# Patient Record
Sex: MALE | Race: BLACK OR AFRICAN AMERICAN | NOT HISPANIC OR LATINO
[De-identification: names, ages, dates, MRNs, and addresses within clinical notes are randomized per-mention and may not be internally consistent; named-entity substitution may affect disease eponyms.]

---

## 2021-10-16 ENCOUNTER — TRANSCRIPTION ENCOUNTER (OUTPATIENT)
Age: 36
End: 2021-10-16

## 2022-08-31 ENCOUNTER — APPOINTMENT (OUTPATIENT)
Dept: GASTROENTEROLOGY | Facility: CLINIC | Age: 37
End: 2022-08-31

## 2022-08-31 VITALS
WEIGHT: 315 LBS | SYSTOLIC BLOOD PRESSURE: 171 MMHG | HEIGHT: 68 IN | DIASTOLIC BLOOD PRESSURE: 84 MMHG | OXYGEN SATURATION: 99 % | BODY MASS INDEX: 47.74 KG/M2 | HEART RATE: 110 BPM

## 2022-08-31 VITALS — DIASTOLIC BLOOD PRESSURE: 92 MMHG | SYSTOLIC BLOOD PRESSURE: 135 MMHG | OXYGEN SATURATION: 95 % | HEART RATE: 102 BPM

## 2022-08-31 DIAGNOSIS — R14.0 ABDOMINAL DISTENSION (GASEOUS): ICD-10-CM

## 2022-08-31 DIAGNOSIS — Z80.0 FAMILY HISTORY OF MALIGNANT NEOPLASM OF DIGESTIVE ORGANS: ICD-10-CM

## 2022-08-31 DIAGNOSIS — I10 ESSENTIAL (PRIMARY) HYPERTENSION: ICD-10-CM

## 2022-08-31 DIAGNOSIS — R63.4 ABNORMAL WEIGHT LOSS: ICD-10-CM

## 2022-08-31 DIAGNOSIS — Z87.891 PERSONAL HISTORY OF NICOTINE DEPENDENCE: ICD-10-CM

## 2022-08-31 DIAGNOSIS — E78.00 PURE HYPERCHOLESTEROLEMIA, UNSPECIFIED: ICD-10-CM

## 2022-08-31 DIAGNOSIS — Z78.9 OTHER SPECIFIED HEALTH STATUS: ICD-10-CM

## 2022-08-31 DIAGNOSIS — R68.81 EARLY SATIETY: ICD-10-CM

## 2022-08-31 DIAGNOSIS — R50.9 FEVER, UNSPECIFIED: ICD-10-CM

## 2022-08-31 DIAGNOSIS — R10.13 EPIGASTRIC PAIN: ICD-10-CM

## 2022-08-31 PROCEDURE — 99204 OFFICE O/P NEW MOD 45 MIN: CPT

## 2022-08-31 NOTE — HISTORY OF PRESENT ILLNESS
[de-identified] : Dr. Rey Mayfield\par [address]\[place] [address]\par \par Pleasant 36-year-old gentleman\par \par Hypertension and elevated cholesterol\par \par Morbidly obese\par \par He has had several days for more of epigastric and upper abdominal bloating and discomfort\par \par No real radiation\par \par He has been eating much less\par \par He thinks he lost 10 pounds over the last couple of weeks\par \par He is actually trying to diet at this point\par \par He had a recent job change and is now trying a more healthy lifestyle\par \par He gave up smoking\par \par He drinks only socially\par \par No NSAIDs\par \par No nausea, vomiting\par \par He has not tried any over-the-counter medications\par \par No family history of any peptic ulcer, gallstones, pancreatitis\par \par Grandparent may have had colon cancer\par \par No change in bowel habits or blood per rectum

## 2022-08-31 NOTE — ASSESSMENT
[FreeTextEntry1] : Impression\par \par Abdominal bloating and discomfort mostly in the epigastric area for several days to week\par \par 10 pound weight loss in the last couple of weeks or so\par \par 1 day of a fever of 101\par \par No fever now\par \par He feels better when he does not eat\par \par He has early satiety when he does eat\par \par Is no heartburn or indigestion\par \par A grandparent had colon cancer\par \par He is morbidly obese\par \par Suggest\par \par Trial of Pepcid 40 mg a day\par \par Gaviscon or Gas-X 2 tablets 3 times a day as needed\par \par Lab work tomorrow\par \par Ultrasound of the abdomen\par \par Upper GI series double contrast\par \par His morbid obesity would present some added risk for upper endoscopy and symptoms of only been occurring for about a week, so we will hold off on this\par \par If upper endoscopy was to be done it would need to be done in a hospital setting\par \par He will follow-up after above\par \par He will go to emergency room if needed\par \par He can call or return anytime sooner as needed

## 2022-08-31 NOTE — REASON FOR VISIT
[Initial Evaluation] : an initial evaluation [FreeTextEntry1] : Nominal bloating and discomfort and early satiety, he believes a 10 pound intentional weight loss but he had a fever of 101 for 1 day, now normal

## 2022-08-31 NOTE — PHYSICAL EXAM
[FreeTextEntry1] : Morbidly obese gentleman, no acute distress, alert and oriented x3 [Sclera] : the sclera and conjunctiva were normal [Neck Appearance] : the appearance of the neck was normal [Neck Cervical Mass (___cm)] : no neck mass was observed [Jugular Venous Distention Increased] : there was no jugular-venous distention [Auscultation Breath Sounds / Voice Sounds] : lungs were clear to auscultation bilaterally [Apical Impulse] : the apical impulse was normal [Full Pulse] : the pedal pulses are present [Edema] : there was no peripheral edema [Bowel Sounds] : normal bowel sounds [Abdomen Soft] : soft [Abdomen Tenderness] : non-tender [] : no hepato-splenomegaly [Abdomen Mass (___ Cm)] : no abdominal mass palpated [Cervical Lymph Nodes Enlarged Posterior Bilaterally] : posterior cervical [Cervical Lymph Nodes Enlarged Anterior Bilaterally] : anterior cervical [Supraclavicular Lymph Nodes Enlarged Bilaterally] : supraclavicular [Axillary Lymph Nodes Enlarged Bilaterally] : axillary [Femoral Lymph Nodes Enlarged Bilaterally] : femoral [Inguinal Lymph Nodes Enlarged Bilaterally] : inguinal [No CVA Tenderness] : no ~M costovertebral angle tenderness [No Spinal Tenderness] : no spinal tenderness [Abnormal Walk] : normal gait [Nail Clubbing] : no clubbing  or cyanosis of the fingernails [Musculoskeletal - Swelling] : no joint swelling seen [Motor Tone] : muscle strength and tone were normal [Skin Color & Pigmentation] : normal skin color and pigmentation [No Focal Deficits] : no focal deficits [Oriented To Time, Place, And Person] : oriented to person, place, and time [Impaired Insight] : insight and judgment were intact [Affect] : the affect was normal

## 2022-08-31 NOTE — CONSULT LETTER
[Dear  ___] : Dear  [unfilled], [Consult Letter:] : I had the pleasure of evaluating your patient, [unfilled]. [Please see my note below.] : Please see my note below. [Consult Closing:] : Thank you very much for allowing me to participate in the care of this patient.  If you have any questions, please do not hesitate to contact me. [Sincerely,] : Sincerely, [FreeTextEntry2] : Dr. Rey Mayfield\par [address]\[place] [address] [FreeTextEntry3] : Michael Christine MD\par

## 2022-09-17 ENCOUNTER — EMERGENCY (EMERGENCY)
Facility: HOSPITAL | Age: 37
LOS: 0 days | Discharge: HOME | End: 2022-09-17
Attending: STUDENT IN AN ORGANIZED HEALTH CARE EDUCATION/TRAINING PROGRAM
Payer: COMMERCIAL

## 2022-09-17 VITALS
DIASTOLIC BLOOD PRESSURE: 113 MMHG | OXYGEN SATURATION: 98 % | HEART RATE: 93 BPM | SYSTOLIC BLOOD PRESSURE: 154 MMHG | TEMPERATURE: 98 F | RESPIRATION RATE: 16 BRPM

## 2022-09-17 VITALS — WEIGHT: 315 LBS | HEIGHT: 68 IN

## 2022-09-17 DIAGNOSIS — Z87.891 PERSONAL HISTORY OF NICOTINE DEPENDENCE: ICD-10-CM

## 2022-09-17 DIAGNOSIS — R10.12 LEFT UPPER QUADRANT PAIN: ICD-10-CM

## 2022-09-17 DIAGNOSIS — T46.5X6A UNDERDOSING OF OTHER ANTIHYPERTENSIVE DRUGS, INITIAL ENCOUNTER: ICD-10-CM

## 2022-09-17 DIAGNOSIS — I10 ESSENTIAL (PRIMARY) HYPERTENSION: ICD-10-CM
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
## 2022-09-17 DIAGNOSIS — Z91.14 PATIENT'S OTHER NONCOMPLIANCE WITH MEDICATION REGIMEN: ICD-10-CM
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
## 2022-09-17 DIAGNOSIS — Z88.0 ALLERGY STATUS TO PENICILLIN: ICD-10-CM

## 2022-09-17 DIAGNOSIS — R68.83 CHILLS (WITHOUT FEVER): ICD-10-CM

## 2022-09-17 LAB
ALBUMIN SERPL ELPH-MCNC: 3.6 G/DL — SIGNIFICANT CHANGE UP (ref 3.3–5)
ALP SERPL-CCNC: 80 U/L — SIGNIFICANT CHANGE UP (ref 40–120)
ALT FLD-CCNC: 81 U/L — HIGH (ref 12–78)
ANION GAP SERPL CALC-SCNC: 8 MMOL/L — SIGNIFICANT CHANGE UP (ref 5–17)
APPEARANCE UR: CLEAR — SIGNIFICANT CHANGE UP
AST SERPL-CCNC: 30 U/L — SIGNIFICANT CHANGE UP (ref 15–37)
BASOPHILS # BLD AUTO: 0.04 K/UL — SIGNIFICANT CHANGE UP (ref 0–0.2)
BASOPHILS NFR BLD AUTO: 0.6 % — SIGNIFICANT CHANGE UP (ref 0–2)
BILIRUB SERPL-MCNC: 0.5 MG/DL — SIGNIFICANT CHANGE UP (ref 0.2–1.2)
BILIRUB UR-MCNC: NEGATIVE — SIGNIFICANT CHANGE UP
BUN SERPL-MCNC: 25 MG/DL — HIGH (ref 7–23)
CALCIUM SERPL-MCNC: 9.2 MG/DL — SIGNIFICANT CHANGE UP (ref 8.5–10.1)
CHLORIDE SERPL-SCNC: 110 MMOL/L — HIGH (ref 96–108)
CO2 SERPL-SCNC: 23 MMOL/L — SIGNIFICANT CHANGE UP (ref 22–31)
COLOR SPEC: YELLOW — SIGNIFICANT CHANGE UP
CREAT SERPL-MCNC: 1.44 MG/DL — HIGH (ref 0.5–1.3)
DIFF PNL FLD: NEGATIVE — SIGNIFICANT CHANGE UP
EGFR: 65 ML/MIN/1.73M2 — SIGNIFICANT CHANGE UP
EOSINOPHIL # BLD AUTO: 0.04 K/UL — SIGNIFICANT CHANGE UP (ref 0–0.5)
EOSINOPHIL NFR BLD AUTO: 0.6 % — SIGNIFICANT CHANGE UP (ref 0–6)
GLUCOSE SERPL-MCNC: 126 MG/DL — HIGH (ref 70–99)
GLUCOSE UR QL: NEGATIVE — SIGNIFICANT CHANGE UP
HCT VFR BLD CALC: 42.7 % — SIGNIFICANT CHANGE UP (ref 39–50)
HGB BLD-MCNC: 14.1 G/DL — SIGNIFICANT CHANGE UP (ref 13–17)
IMM GRANULOCYTES NFR BLD AUTO: 0.1 % — SIGNIFICANT CHANGE UP (ref 0–0.9)
KETONES UR-MCNC: NEGATIVE — SIGNIFICANT CHANGE UP
LACTATE SERPL-SCNC: 1.1 MMOL/L — SIGNIFICANT CHANGE UP (ref 0.7–2)
LEUKOCYTE ESTERASE UR-ACNC: NEGATIVE — SIGNIFICANT CHANGE UP
LIDOCAIN IGE QN: 90 U/L — SIGNIFICANT CHANGE UP (ref 73–393)
LYMPHOCYTES # BLD AUTO: 1.23 K/UL — SIGNIFICANT CHANGE UP (ref 1–3.3)
LYMPHOCYTES # BLD AUTO: 18.1 % — SIGNIFICANT CHANGE UP (ref 13–44)
MCHC RBC-ENTMCNC: 29.3 PG — SIGNIFICANT CHANGE UP (ref 27–34)
MCHC RBC-ENTMCNC: 33 GM/DL — SIGNIFICANT CHANGE UP (ref 32–36)
MCV RBC AUTO: 88.8 FL — SIGNIFICANT CHANGE UP (ref 80–100)
MONOCYTES # BLD AUTO: 0.46 K/UL — SIGNIFICANT CHANGE UP (ref 0–0.9)
MONOCYTES NFR BLD AUTO: 6.8 % — SIGNIFICANT CHANGE UP (ref 2–14)
NEUTROPHILS # BLD AUTO: 5.01 K/UL — SIGNIFICANT CHANGE UP (ref 1.8–7.4)
NEUTROPHILS NFR BLD AUTO: 73.8 % — SIGNIFICANT CHANGE UP (ref 43–77)
NITRITE UR-MCNC: NEGATIVE — SIGNIFICANT CHANGE UP
PH UR: 6 — SIGNIFICANT CHANGE UP (ref 5–8)
PLATELET # BLD AUTO: 317 K/UL — SIGNIFICANT CHANGE UP (ref 150–400)
POTASSIUM SERPL-MCNC: 4.1 MMOL/L — SIGNIFICANT CHANGE UP (ref 3.5–5.3)
POTASSIUM SERPL-SCNC: 4.1 MMOL/L — SIGNIFICANT CHANGE UP (ref 3.5–5.3)
PROT SERPL-MCNC: 7.8 GM/DL — SIGNIFICANT CHANGE UP (ref 6–8.3)
PROT UR-MCNC: 100
RBC # BLD: 4.81 M/UL — SIGNIFICANT CHANGE UP (ref 4.2–5.8)
RBC # FLD: 14.4 % — SIGNIFICANT CHANGE UP (ref 10.3–14.5)
SODIUM SERPL-SCNC: 141 MMOL/L — SIGNIFICANT CHANGE UP (ref 135–145)
SP GR SPEC: 1.01 — SIGNIFICANT CHANGE UP (ref 1.01–1.02)
UROBILINOGEN FLD QL: NEGATIVE — SIGNIFICANT CHANGE UP
WBC # BLD: 6.79 K/UL — SIGNIFICANT CHANGE UP (ref 3.8–10.5)
WBC # FLD AUTO: 6.79 K/UL — SIGNIFICANT CHANGE UP (ref 3.8–10.5)

## 2022-09-17 PROCEDURE — 80053 COMPREHEN METABOLIC PANEL: CPT

## 2022-09-17 PROCEDURE — 81001 URINALYSIS AUTO W/SCOPE: CPT

## 2022-09-17 PROCEDURE — 83690 ASSAY OF LIPASE: CPT

## 2022-09-17 PROCEDURE — 36415 COLL VENOUS BLD VENIPUNCTURE: CPT

## 2022-09-17 PROCEDURE — 99284 EMERGENCY DEPT VISIT MOD MDM: CPT | Mod: 25

## 2022-09-17 PROCEDURE — 85025 COMPLETE CBC W/AUTO DIFF WBC: CPT

## 2022-09-17 PROCEDURE — 96374 THER/PROPH/DIAG INJ IV PUSH: CPT

## 2022-09-17 PROCEDURE — 96375 TX/PRO/DX INJ NEW DRUG ADDON: CPT

## 2022-09-17 PROCEDURE — 74176 CT ABD & PELVIS W/O CONTRAST: CPT | Mod: MA

## 2022-09-17 PROCEDURE — 99285 EMERGENCY DEPT VISIT HI MDM: CPT

## 2022-09-17 PROCEDURE — 87086 URINE CULTURE/COLONY COUNT: CPT

## 2022-09-17 PROCEDURE — 83605 ASSAY OF LACTIC ACID: CPT

## 2022-09-17 PROCEDURE — 74176 CT ABD & PELVIS W/O CONTRAST: CPT | Mod: 26,MA

## 2022-09-17 RX ORDER — KETOROLAC TROMETHAMINE 30 MG/ML
30 SYRINGE (ML) INJECTION ONCE
Refills: 0 | Status: DISCONTINUED | OUTPATIENT
Start: 2022-09-17 | End: 2022-09-17

## 2022-09-17 RX ORDER — LIDOCAINE 4 G/100G
1 CREAM TOPICAL
Qty: 8 | Refills: 0
Start: 2022-09-17

## 2022-09-17 RX ORDER — ONDANSETRON 8 MG/1
4 TABLET, FILM COATED ORAL ONCE
Refills: 0 | Status: COMPLETED | OUTPATIENT
Start: 2022-09-17 | End: 2022-09-17

## 2022-09-17 RX ORDER — CYCLOBENZAPRINE HYDROCHLORIDE 10 MG/1
1 TABLET, FILM COATED ORAL
Qty: 15 | Refills: 0
Start: 2022-09-17

## 2022-09-17 RX ORDER — IBUPROFEN 200 MG
1 TABLET ORAL
Qty: 30 | Refills: 0
Start: 2022-09-17

## 2022-09-17 RX ORDER — SODIUM CHLORIDE 9 MG/ML
2000 INJECTION INTRAMUSCULAR; INTRAVENOUS; SUBCUTANEOUS ONCE
Refills: 0 | Status: COMPLETED | OUTPATIENT
Start: 2022-09-17 | End: 2022-09-17

## 2022-09-17 RX ADMIN — Medication 30 MILLIGRAM(S): at 14:29

## 2022-09-17 RX ADMIN — SODIUM CHLORIDE 2000 MILLILITER(S): 9 INJECTION INTRAMUSCULAR; INTRAVENOUS; SUBCUTANEOUS at 14:29

## 2022-09-17 RX ADMIN — ONDANSETRON 4 MILLIGRAM(S): 8 TABLET, FILM COATED ORAL at 14:29

## 2022-09-17 NOTE — ED STATDOCS - PROGRESS NOTE DETAILS
35 y/o m with PMH of HTN presents with sudden onset of left sided abdominal pain at 9:30AM today. Pain has been persistent. +nausea w/out vomiting, +chills. Denies fever, urinary complaints, diarrhea, constipation, history of abdominal surgery. PE: Uncomfortable appearing. Cardiac: s1s2, RRR. lungs: CTAB. Abdomen: NBS x4, +LLQ voluntary guarding, +left flank tenderness. no CVAT. A/p Concern for kidney stone, plan for labs, analgesia, CT, IVF, reassess. - Rian Cardoza PA-C Labs and CT with no actionable findings. Pt with improvement in symptoms following ED management. Advised PCP follow up for further assessment. Patient agreeable to plan. Lane azul. - Rian Cardoza PA-C

## 2022-09-17 NOTE — ED STATDOCS - CLINICAL SUMMARY MEDICAL DECISION MAKING FREE TEXT BOX
adult male w left flank pain radiating to testicle. will evaluate for kidney stone. exam not consistent with testicular torsion.

## 2022-09-17 NOTE — ED STATDOCS - OBJECTIVE STATEMENT
36 year old male with PMHx of HTN presents to the ED complaining of flank pain. This morning at 9:30am during BM pt had left flank pain and felt like had to vomit. Describes as intermittent pain alleviated by standing. no hx kidney stones. Denies urinary problems, diarrhea, fever. Notes normal small BM this morning. Notes associated chills. HTN meds prescribed last month but hasn't taken them. former smoker. Also notes left testicular pulling sensation.

## 2022-09-17 NOTE — ED ADULT TRIAGE NOTE - CHIEF COMPLAINT QUOTE
Pt presents to the ED c/o left flank pain radiating to his groin and nausea x2 hours. Pt states he developed the pain after he urinated and had a BM this morning. Pt reports worsening pain when sitting down. Denies vomiting, diarrhea or fevers. No medication PTA.

## 2022-09-17 NOTE — ED STATDOCS - ATTENDING APP SHARED VISIT CONTRIBUTION OF CARE
I, Troy Sosa, DO personally saw the patient with DAVIDA.  I have personally performed a face to face diagnostic evaluation on this patient.  I have reviewed the DAVIDA note and agree with the history, exam, and plan of care, except as noted.  I personally saw the patient and performed a substantive portion of the visit including all aspects of the medical decision making.

## 2022-09-17 NOTE — ED STATDOCS - NSFOLLOWUPINSTRUCTIONS_ED_ALL_ED_FT
PLEASE FOLLOW UP WITH YOUR PCP FOR FURTHER ASSESSMENT.     Back Pain    WHAT YOU NEED TO KNOW:    Back pain is common. It can be caused by many conditions, such as arthritis or the breakdown of spinal discs. Your risk for back pain is increased by injuries, lack of activity, or repeated bending and twisting. You may feel sore or stiff on one or both sides of your back. The pain may spread to your buttocks or thighs.    DISCHARGE INSTRUCTIONS:    Return to the emergency department if:     You have pain, numbness, or weakness in one or both legs.      Your pain becomes so severe that you cannot walk.      You cannot control your urine or bowel movements.      You have severe back pain with chest pain.      You have severe back pain, nausea, and vomiting.      You have severe back pain that spreads to your side or genital area.    Contact your healthcare provider if:     You have back pain that does not get better with rest and pain medicine.      You have a fever.      You have pain that worsens when you are on your back or when you rest.      You have pain that worsens when you cough or sneeze.      You lose weight without trying.      You have questions or concerns about your condition or care.    Medicines:     NSAIDs help decrease swelling and pain. This medicine is available with or without a doctor's order. NSAIDs can cause stomach bleeding or kidney problems in certain people. If you take blood thinner medicine, always ask your healthcare provider if NSAIDs are safe for you. Always read the medicine label and follow directions.      Acetaminophen decreases pain and fever. It is available without a doctor's order. Ask how much to take and how often to take it. Follow directions. Read the labels of all other medicines you are using to see if they also contain acetaminophen, or ask your doctor or pharmacist. Acetaminophen can cause liver damage if not taken correctly. Do not use more than 4 grams (4,000 milligrams) total of acetaminophen in one day.       Muscle relaxers help decrease muscle spasms and back pain.      Prescription pain medicine may be given. Ask your healthcare provider how to take this medicine safely. Some prescription pain medicines contain acetaminophen. Do not take other medicines that contain acetaminophen without talking to your healthcare provider. Too much acetaminophen may cause liver damage. Prescription pain medicine may cause constipation. Ask your healthcare provider how to prevent or treat constipation.       Take your medicine as directed. Contact your healthcare provider if you think your medicine is not helping or if you have side effects. Tell him or her if you are allergic to any medicine. Keep a list of the medicines, vitamins, and herbs you take. Include the amounts, and when and why you take them. Bring the list or the pill bottles to follow-up visits. Carry your medicine list with you in case of an emergency.    How to manage your back pain:     Apply ice on your back for 15 to 20 minutes every hour or as directed. Use an ice pack, or put crushed ice in a plastic bag. Cover it with a towel before you apply it to your skin. Ice helps prevent tissue damage and decreases pain.      Apply heat on your back for 20 to 30 minutes every 2 hours for as many days as directed. Heat helps decrease pain and muscle spasms.      Stay active as much as you can without causing more pain. Bed rest could make your back pain worse. Avoid heavy lifting until your pain is gone.      Go to physical therapy as directed. A physical therapist can teach you exercises to help improve movement and strength, and to decrease pain.    Follow up with your healthcare provider in 2 weeks, or as directed: Write down your questions so you remember to ask them during your visits.

## 2022-09-17 NOTE — ED STATDOCS - PATIENT PORTAL LINK FT
You can access the FollowMyHealth Patient Portal offered by [facility] by registering at the following website: [email or web address]. By joining [email or web address]’s FollowMyHealth portal, you will also be able to view your health information using other applications (apps) compatible with our system.

## 2022-09-17 NOTE — ED STATDOCS - NS ED ATTENDING STATEMENT MOD
This was a shared visit with the DAVIDA. I reviewed and verified the documentation and independently performed the documented:

## 2022-09-18 LAB
CULTURE RESULTS: NO GROWTH — SIGNIFICANT CHANGE UP
SPECIMEN SOURCE: SIGNIFICANT CHANGE UP

## 2022-09-19 ENCOUNTER — INPATIENT (INPATIENT)
Facility: HOSPITAL | Age: 37
LOS: 2 days | Discharge: HOME | DRG: 872 | End: 2022-09-22
Attending: FAMILY MEDICINE | Admitting: HOSPITALIST
Payer: COMMERCIAL

## 2022-09-19 ENCOUNTER — TRANSCRIPTION ENCOUNTER (OUTPATIENT)
Age: 37
End: 2022-09-19

## 2022-09-19 VITALS — HEIGHT: 68 IN | WEIGHT: 315 LBS

## 2022-09-19 DIAGNOSIS — A41.9 SEPSIS, UNSPECIFIED ORGANISM: ICD-10-CM

## 2022-09-19 LAB
ALBUMIN SERPL ELPH-MCNC: 3.3 G/DL — SIGNIFICANT CHANGE UP (ref 3.3–5)
ALP SERPL-CCNC: 68 U/L — SIGNIFICANT CHANGE UP (ref 40–120)
ALT FLD-CCNC: 59 U/L — SIGNIFICANT CHANGE UP (ref 12–78)
ANION GAP SERPL CALC-SCNC: 7 MMOL/L — SIGNIFICANT CHANGE UP (ref 5–17)
APPEARANCE UR: CLEAR — SIGNIFICANT CHANGE UP
AST SERPL-CCNC: 35 U/L — SIGNIFICANT CHANGE UP (ref 15–37)
BASOPHILS # BLD AUTO: 0 K/UL — SIGNIFICANT CHANGE UP (ref 0–0.2)
BASOPHILS NFR BLD AUTO: 0 % — SIGNIFICANT CHANGE UP (ref 0–2)
BILIRUB SERPL-MCNC: 1.5 MG/DL — HIGH (ref 0.2–1.2)
BILIRUB UR-MCNC: NEGATIVE — SIGNIFICANT CHANGE UP
BUN SERPL-MCNC: 13 MG/DL — SIGNIFICANT CHANGE UP (ref 7–23)
CALCIUM SERPL-MCNC: 9 MG/DL — SIGNIFICANT CHANGE UP (ref 8.5–10.1)
CHLORIDE SERPL-SCNC: 106 MMOL/L — SIGNIFICANT CHANGE UP (ref 96–108)
CO2 SERPL-SCNC: 24 MMOL/L — SIGNIFICANT CHANGE UP (ref 22–31)
COLOR SPEC: YELLOW — SIGNIFICANT CHANGE UP
CREAT SERPL-MCNC: 1.79 MG/DL — HIGH (ref 0.5–1.3)
DIFF PNL FLD: ABNORMAL
EGFR: 50 ML/MIN/1.73M2 — LOW
EOSINOPHIL # BLD AUTO: 0 K/UL — SIGNIFICANT CHANGE UP (ref 0–0.5)
EOSINOPHIL NFR BLD AUTO: 0 % — SIGNIFICANT CHANGE UP (ref 0–6)
FLUAV AG NPH QL: SIGNIFICANT CHANGE UP
FLUBV AG NPH QL: SIGNIFICANT CHANGE UP
GLUCOSE SERPL-MCNC: 101 MG/DL — HIGH (ref 70–99)
GLUCOSE UR QL: NEGATIVE — SIGNIFICANT CHANGE UP
HCT VFR BLD CALC: 39.9 % — SIGNIFICANT CHANGE UP (ref 39–50)
HGB BLD-MCNC: 13.4 G/DL — SIGNIFICANT CHANGE UP (ref 13–17)
KETONES UR-MCNC: NEGATIVE — SIGNIFICANT CHANGE UP
LACTATE SERPL-SCNC: 1.2 MMOL/L — SIGNIFICANT CHANGE UP (ref 0.7–2)
LEUKOCYTE ESTERASE UR-ACNC: NEGATIVE — SIGNIFICANT CHANGE UP
LIDOCAIN IGE QN: 73 U/L — SIGNIFICANT CHANGE UP (ref 73–393)
LYMPHOCYTES # BLD AUTO: 18 % — SIGNIFICANT CHANGE UP (ref 13–44)
LYMPHOCYTES # BLD AUTO: 2.43 K/UL — SIGNIFICANT CHANGE UP (ref 1–3.3)
MCHC RBC-ENTMCNC: 29.4 PG — SIGNIFICANT CHANGE UP (ref 27–34)
MCHC RBC-ENTMCNC: 33.6 GM/DL — SIGNIFICANT CHANGE UP (ref 32–36)
MCV RBC AUTO: 87.5 FL — SIGNIFICANT CHANGE UP (ref 80–100)
MONOCYTES # BLD AUTO: 1.08 K/UL — HIGH (ref 0–0.9)
MONOCYTES NFR BLD AUTO: 8 % — SIGNIFICANT CHANGE UP (ref 2–14)
NEUTROPHILS # BLD AUTO: 10 K/UL — HIGH (ref 1.8–7.4)
NEUTROPHILS NFR BLD AUTO: 74 % — SIGNIFICANT CHANGE UP (ref 43–77)
NITRITE UR-MCNC: NEGATIVE — SIGNIFICANT CHANGE UP
NRBC # BLD: SIGNIFICANT CHANGE UP /100 WBCS (ref 0–0)
PH UR: 7 — SIGNIFICANT CHANGE UP (ref 5–8)
PLATELET # BLD AUTO: 279 K/UL — SIGNIFICANT CHANGE UP (ref 150–400)
POTASSIUM SERPL-MCNC: 3.6 MMOL/L — SIGNIFICANT CHANGE UP (ref 3.5–5.3)
POTASSIUM SERPL-SCNC: 3.6 MMOL/L — SIGNIFICANT CHANGE UP (ref 3.5–5.3)
PROT SERPL-MCNC: 7.7 GM/DL — SIGNIFICANT CHANGE UP (ref 6–8.3)
PROT UR-MCNC: 100
RBC # BLD: 4.56 M/UL — SIGNIFICANT CHANGE UP (ref 4.2–5.8)
RBC # FLD: 14.5 % — SIGNIFICANT CHANGE UP (ref 10.3–14.5)
RSV RNA NPH QL NAA+NON-PROBE: SIGNIFICANT CHANGE UP
SARS-COV-2 RNA SPEC QL NAA+PROBE: SIGNIFICANT CHANGE UP
SODIUM SERPL-SCNC: 137 MMOL/L — SIGNIFICANT CHANGE UP (ref 135–145)
SP GR SPEC: 1.01 — SIGNIFICANT CHANGE UP (ref 1.01–1.02)
TROPONIN I, HIGH SENSITIVITY RESULT: 55.13 NG/L — SIGNIFICANT CHANGE UP
UROBILINOGEN FLD QL: 1
WBC # BLD: 13.52 K/UL — HIGH (ref 3.8–10.5)
WBC # FLD AUTO: 13.52 K/UL — HIGH (ref 3.8–10.5)

## 2022-09-19 PROCEDURE — 85027 COMPLETE CBC AUTOMATED: CPT

## 2022-09-19 PROCEDURE — 80053 COMPREHEN METABOLIC PANEL: CPT

## 2022-09-19 PROCEDURE — 86780 TREPONEMA PALLIDUM: CPT

## 2022-09-19 PROCEDURE — 85025 COMPLETE CBC W/AUTO DIFF WBC: CPT

## 2022-09-19 PROCEDURE — 87591 N.GONORRHOEAE DNA AMP PROB: CPT

## 2022-09-19 PROCEDURE — 85610 PROTHROMBIN TIME: CPT

## 2022-09-19 PROCEDURE — 99285 EMERGENCY DEPT VISIT HI MDM: CPT

## 2022-09-19 PROCEDURE — 99223 1ST HOSP IP/OBS HIGH 75: CPT

## 2022-09-19 PROCEDURE — 36415 COLL VENOUS BLD VENIPUNCTURE: CPT

## 2022-09-19 PROCEDURE — 80074 ACUTE HEPATITIS PANEL: CPT

## 2022-09-19 PROCEDURE — 80048 BASIC METABOLIC PNL TOTAL CA: CPT

## 2022-09-19 PROCEDURE — 87389 HIV-1 AG W/HIV-1&-2 AB AG IA: CPT

## 2022-09-19 PROCEDURE — 74177 CT ABD & PELVIS W/CONTRAST: CPT | Mod: 26,MA

## 2022-09-19 PROCEDURE — 93010 ELECTROCARDIOGRAM REPORT: CPT

## 2022-09-19 PROCEDURE — 87491 CHLMYD TRACH DNA AMP PROBE: CPT

## 2022-09-19 RX ORDER — METRONIDAZOLE 500 MG
500 TABLET ORAL ONCE
Refills: 0 | Status: COMPLETED | OUTPATIENT
Start: 2022-09-19 | End: 2022-09-19

## 2022-09-19 RX ORDER — CEFTRIAXONE 500 MG/1
1000 INJECTION, POWDER, FOR SOLUTION INTRAMUSCULAR; INTRAVENOUS ONCE
Refills: 0 | Status: COMPLETED | OUTPATIENT
Start: 2022-09-19 | End: 2022-09-20

## 2022-09-19 RX ORDER — LANOLIN ALCOHOL/MO/W.PET/CERES
3 CREAM (GRAM) TOPICAL AT BEDTIME
Refills: 0 | Status: DISCONTINUED | OUTPATIENT
Start: 2022-09-19 | End: 2022-09-22

## 2022-09-19 RX ORDER — AZITHROMYCIN 500 MG/1
500 TABLET, FILM COATED ORAL ONCE
Refills: 0 | Status: COMPLETED | OUTPATIENT
Start: 2022-09-19 | End: 2022-09-19

## 2022-09-19 RX ORDER — ONDANSETRON 8 MG/1
4 TABLET, FILM COATED ORAL EVERY 8 HOURS
Refills: 0 | Status: DISCONTINUED | OUTPATIENT
Start: 2022-09-19 | End: 2022-09-22

## 2022-09-19 RX ORDER — CEFTRIAXONE 500 MG/1
1000 INJECTION, POWDER, FOR SOLUTION INTRAMUSCULAR; INTRAVENOUS EVERY 24 HOURS
Refills: 0 | Status: DISCONTINUED | OUTPATIENT
Start: 2022-09-20 | End: 2022-09-20

## 2022-09-19 RX ORDER — ONDANSETRON 8 MG/1
4 TABLET, FILM COATED ORAL ONCE
Refills: 0 | Status: COMPLETED | OUTPATIENT
Start: 2022-09-19 | End: 2022-09-19

## 2022-09-19 RX ORDER — IBUPROFEN 200 MG
600 TABLET ORAL ONCE
Refills: 0 | Status: COMPLETED | OUTPATIENT
Start: 2022-09-19 | End: 2022-09-19

## 2022-09-19 RX ORDER — MORPHINE SULFATE 50 MG/1
2 CAPSULE, EXTENDED RELEASE ORAL EVERY 4 HOURS
Refills: 0 | Status: DISCONTINUED | OUTPATIENT
Start: 2022-09-19 | End: 2022-09-22

## 2022-09-19 RX ORDER — MORPHINE SULFATE 50 MG/1
4 CAPSULE, EXTENDED RELEASE ORAL ONCE
Refills: 0 | Status: DISCONTINUED | OUTPATIENT
Start: 2022-09-19 | End: 2022-09-19

## 2022-09-19 RX ORDER — ACETAMINOPHEN 500 MG
650 TABLET ORAL EVERY 6 HOURS
Refills: 0 | Status: DISCONTINUED | OUTPATIENT
Start: 2022-09-19 | End: 2022-09-22

## 2022-09-19 RX ORDER — CEFTRIAXONE 500 MG/1
1000 INJECTION, POWDER, FOR SOLUTION INTRAMUSCULAR; INTRAVENOUS ONCE
Refills: 0 | Status: DISCONTINUED | OUTPATIENT
Start: 2022-09-19 | End: 2022-09-19

## 2022-09-19 RX ORDER — SODIUM CHLORIDE 9 MG/ML
1000 INJECTION INTRAMUSCULAR; INTRAVENOUS; SUBCUTANEOUS
Refills: 0 | Status: DISCONTINUED | OUTPATIENT
Start: 2022-09-19 | End: 2022-09-21

## 2022-09-19 RX ORDER — CIPROFLOXACIN LACTATE 400MG/40ML
400 VIAL (ML) INTRAVENOUS ONCE
Refills: 0 | Status: COMPLETED | OUTPATIENT
Start: 2022-09-19 | End: 2022-09-19

## 2022-09-19 RX ORDER — ACETAMINOPHEN 500 MG
1000 TABLET ORAL ONCE
Refills: 0 | Status: COMPLETED | OUTPATIENT
Start: 2022-09-19 | End: 2022-09-19

## 2022-09-19 RX ORDER — ATORVASTATIN CALCIUM 80 MG/1
20 TABLET, FILM COATED ORAL AT BEDTIME
Refills: 0 | Status: DISCONTINUED | OUTPATIENT
Start: 2022-09-19 | End: 2022-09-22

## 2022-09-19 RX ORDER — SODIUM CHLORIDE 9 MG/ML
1000 INJECTION, SOLUTION INTRAVENOUS ONCE
Refills: 0 | Status: COMPLETED | OUTPATIENT
Start: 2022-09-19 | End: 2022-09-19

## 2022-09-19 RX ORDER — AMLODIPINE BESYLATE 2.5 MG/1
2.5 TABLET ORAL DAILY
Refills: 0 | Status: DISCONTINUED | OUTPATIENT
Start: 2022-09-19 | End: 2022-09-20

## 2022-09-19 RX ORDER — SODIUM CHLORIDE 9 MG/ML
2000 INJECTION INTRAMUSCULAR; INTRAVENOUS; SUBCUTANEOUS ONCE
Refills: 0 | Status: COMPLETED | OUTPATIENT
Start: 2022-09-19 | End: 2022-09-19

## 2022-09-19 RX ADMIN — Medication 600 MILLIGRAM(S): at 22:48

## 2022-09-19 RX ADMIN — Medication 200 MILLIGRAM(S): at 21:12

## 2022-09-19 RX ADMIN — SODIUM CHLORIDE 1000 MILLILITER(S): 9 INJECTION, SOLUTION INTRAVENOUS at 23:46

## 2022-09-19 RX ADMIN — Medication 1000 MILLIGRAM(S): at 21:12

## 2022-09-19 RX ADMIN — SODIUM CHLORIDE 2000 MILLILITER(S): 9 INJECTION INTRAMUSCULAR; INTRAVENOUS; SUBCUTANEOUS at 21:11

## 2022-09-19 RX ADMIN — ONDANSETRON 4 MILLIGRAM(S): 8 TABLET, FILM COATED ORAL at 21:11

## 2022-09-19 RX ADMIN — MORPHINE SULFATE 4 MILLIGRAM(S): 50 CAPSULE, EXTENDED RELEASE ORAL at 21:11

## 2022-09-19 RX ADMIN — Medication 100 MILLIGRAM(S): at 21:12

## 2022-09-19 RX ADMIN — AZITHROMYCIN 255 MILLIGRAM(S): 500 TABLET, FILM COATED ORAL at 23:45

## 2022-09-19 NOTE — ED STATDOCS - NS_ ATTENDINGSCRIBEDETAILS _ED_A_ED_FT
I, Pardeep Orr MD,  performed the initial face to face bedside interview with this patient regarding history of present illness, review of symptoms and relevant past medical, social and family history.  I completed an independent physical examination.  I was the initial provider who evaluated this patient. I have signed out the follow up of any pending tests (i.e. labs, radiological studies) to the DAVIDA.  I have communicated the patient’s plan of care and disposition with the DAVIDA.  The history, relevant review of systems, past medical and surgical history, medical decision making, and physical examination was documented by the scribe in my presence and I attest to the accuracy of the documentation.

## 2022-09-19 NOTE — H&P ADULT - HISTORY OF PRESENT ILLNESS
36 year old male patient presented to the ED complaining of a 3 day history of abdominal pain. Patient endorses a left sided flank/ lower abdominal pain associated with fever, chills and nausea. He endorsed an initially intermittent pain that was localized and felt like a " vibration". Denies associated urinary frequency, urgency, hematuria or dysuria. Last bowel movement 3 days ago. No trauma noted. Patient was seen and evaluated in the ED 3 days ago and diagnosed with a muscular strain- states pain persisted. He followed up with his PCP today and returned when no pain relief was noted. Patient with little concern for possible STI- states he is sexually active with two partners but is asymptomatic.      In the ED patient found to have leukocytosis and tachycardia. CT abd/pelvis revealed: Findings suspicious for left ureteritis and bilateral polynephritis, left   greater than right. Recommend correlation with urinalysis. Differential   diagnosis includes renal infarcts, but considered less likely given the   degree of inflammatory stranding.

## 2022-09-19 NOTE — PHARMACOTHERAPY INTERVENTION NOTE - COMMENTS
Medication reconciliation completed.  Reviewed Medication list and confirmed med allergies with patient; confirmed with Dr. First Medmagdalena.

## 2022-09-19 NOTE — ED ADULT NURSE NOTE - OBJECTIVE STATEMENT
pt arrived c/o left lower quadrant abdominal pain. pt seen by PCP and seen at  two days ago. pt endorses worsening pain at site. pt alert and ambulatory. no sings of distress noted.

## 2022-09-19 NOTE — H&P ADULT - NSHPPHYSICALEXAM_GEN_ALL_CORE
Vital Signs Last 24 Hrs  T(C): 38.3 (19 Sep 2022 22:26), Max: 38.3 (19 Sep 2022 22:26)  T(F): 100.9 (19 Sep 2022 22:26), Max: 100.9 (19 Sep 2022 22:26)  HR: 117 (19 Sep 2022 22:26) (117 - 122)  BP: 133/94 (19 Sep 2022 22:26) (133/94 - 162/113)  BP(mean): 106 (19 Sep 2022 22:26) (106 - 127)  RR: 19 (19 Sep 2022 22:26) (19 - 19)  SpO2: 95% (19 Sep 2022 22:26) (95% - 96%)    Parameters below as of 19 Sep 2022 22:26  Patient On (Oxygen Delivery Method): room air

## 2022-09-19 NOTE — ED ADULT TRIAGE NOTE - AS HEIGHT TYPE
"Requested Prescriptions   Pending Prescriptions Disp Refills     balsalazide (COLAZAL) 750 MG capsule [Pharmacy Med Name: BALSALAZIDE DISODIUM 750 MG CP] 270 capsule 2     Sig: TAKE THREE CAPSULES BY MOUTH THREE TIMES DAILY       Inflammatory Bowel Agents Failed - 5/28/2019  9:04 AM        Failed - Serum Creatinine on file in past 12 months     Recent Labs   Lab Test 03/18/13  0841   CR 0.89             Failed - Recent (12 mo) or future (30 days) visit within the authorizing provider's specialty     Patient had office visit in the last 12 months or has a visit in the next 30 days with authorizing provider or within the authorizing provider's specialty.  See \"Patient Info\" tab in inbasket, or \"Choose Columns\" in Meds & Orders section of the refill encounter.              Passed - Medication is active on med list        Passed - Patient is age 18 or older          Last office visit: 2/22/2017 with prescribing provider:     Future Office Visit:      Routing refill request to provider for review/approval because:  Patient needs to be seen because it has been more than 1 year since last office visit.        " stated

## 2022-09-19 NOTE — ED STATDOCS - PROGRESS NOTE DETAILS
Dominga - pt remains tachycardic after meds, IVF and abx going.  on exam. CT with pyelonephritis, lower clinical concern for infarct given nag lactate and bilateral findings on CT with ureteral stranding. UA again neg and Cx from yest neg. Given ongoing tachycardia and leukocytosis from yesterday with significant clincal woirsening of sx since yest, pt will require admission for IV abx and further workup. Denies any STI exposure, urethral discharge. Case d/w hospitalist for admission signed Lisa Flynn PA-C   Pt with worsening left flank pain, seen in ED on 9/17, now with fever today. Labs, CT and ua/cx on 9/17 unremarkable. Today WBC 13, CT with IV contrast pyelo v renal infarct. Will admit for IV abx, possible uro consult. Pt agrees with admission and plan of care.

## 2022-09-19 NOTE — ED STATDOCS - NS ED ROS FT
Constitutional: No fevers, chills, or sweats.  Cardiac: No chest pain, exertional dyspnea, orthopnea  Respiratory: No shortness of breath, no cough  GI: +Left-sided abd pain, no N/V/D  Neuro: No headaches, no neck pain/stiffness, no numbness  All other systems reviewed and are negative unless otherwise stated in the HPI.

## 2022-09-19 NOTE — ED STATDOCS - CARE PLAN
1 Principal Discharge DX:	Sepsis due to urinary tract infection  Secondary Diagnosis:	Acute pyelonephritis

## 2022-09-19 NOTE — ED ADULT TRIAGE NOTE - CHIEF COMPLAINT QUOTE
Patient presents complaining of abdominal pain since Saturday. seen in ED on Saturday and discharged. saw PCP who sent to ED. patient denies nausea and vomiting.

## 2022-09-19 NOTE — ED STATDOCS - NS_BEDUNITTYPES_ED_ALL_ED
Problem: Potential for hypothermia related to immature thermoregulation  Goal: [place] will maintain body temperature between 97.6 degrees axillary F and 99.6 degrees axillary F in an open crib  Infant's temp at am assessment at 97.6f axillary. Infant placed skin to skin with dad. Infant's temp at next check at 98.1f axillary.        MED/SURG

## 2022-09-19 NOTE — ED STATDOCS - OBJECTIVE STATEMENT
37 y/o male with PMHx of HTN, HLD presents to the ED c/o left-sided abd pain with fever. Pt was at [facility] 2 days ago. He saw his doctor afterwards and was referred to the ED. Denies n/v/d, previous abd surgeries, urinary incontinence, previous GI problems. Pt febrile to 100.4 per own test. Pt is allergic to Penicillin & didn't take any medications PTA. He recently quit smoking.

## 2022-09-19 NOTE — ED STATDOCS - PHYSICAL EXAMINATION
General: AAOx3, NAD  HEENT: NCAT  Cardiac: Tachycardic.  Respiratory: Normal rate and effort. CTAB  GI: Moderate left lower and left upper TTP. Voluntary guarding, nonrigid, non acute abd.  Neuro: No focal deficits. SUAREZ equally x4, sensation to light touch intact throughout  MSK: FROMx4, no focal bony tenderness, no peripheral edema  Skin: No rash

## 2022-09-19 NOTE — ED STATDOCS - ATTENDING APP SHARED VISIT CONTRIBUTION OF CARE
I, Pardeep Orr MD, personally saw the patient with DAVIDA.  I have personally performed a face to face diagnostic evaluation on this patient.  I have reviewed the DAVIDA note and agree with the history, exam, and plan of care, except as noted.

## 2022-09-19 NOTE — H&P ADULT - ASSESSMENT
36 year old male patient with findings suggestive of urosepsis/ pyelonephritis        -Admit to [facility]        # Pyelonephritis/ Urosepsis  -CT abdomen/pelvis noted for ureteritis and bilateral pyeloneprhritis  -given cipro/flagyl in the ED  -will check gc urine  -give stat fose of rocephin and zithromax  -continue with rocephin  -IVF hydration  -f/u blood and urine cx  -f/u gc culture    # Abdominal pain  -likely secondary to above    # Leukocytosis  -likely reactive to above  -on IV antibiotics    # MARIANELA  -likely pre-renal  -IVF hydration  -trend kidney function    # HTN  -on norvasc    # HLD  -on statin    # DVT ppx  -scds

## 2022-09-20 LAB
ALBUMIN SERPL ELPH-MCNC: 2.8 G/DL — LOW (ref 3.3–5)
ALP SERPL-CCNC: 70 U/L — SIGNIFICANT CHANGE UP (ref 40–120)
ALT FLD-CCNC: 64 U/L — SIGNIFICANT CHANGE UP (ref 12–78)
ANION GAP SERPL CALC-SCNC: 8 MMOL/L — SIGNIFICANT CHANGE UP (ref 5–17)
AST SERPL-CCNC: 47 U/L — HIGH (ref 15–37)
BASOPHILS # BLD AUTO: 0.05 K/UL — SIGNIFICANT CHANGE UP (ref 0–0.2)
BASOPHILS NFR BLD AUTO: 0.4 % — SIGNIFICANT CHANGE UP (ref 0–2)
BILIRUB SERPL-MCNC: 1.6 MG/DL — HIGH (ref 0.2–1.2)
BUN SERPL-MCNC: 15 MG/DL — SIGNIFICANT CHANGE UP (ref 7–23)
CALCIUM SERPL-MCNC: 8.6 MG/DL — SIGNIFICANT CHANGE UP (ref 8.5–10.1)
CHLORIDE SERPL-SCNC: 109 MMOL/L — HIGH (ref 96–108)
CO2 SERPL-SCNC: 22 MMOL/L — SIGNIFICANT CHANGE UP (ref 22–31)
CREAT SERPL-MCNC: 1.66 MG/DL — HIGH (ref 0.5–1.3)
EGFR: 55 ML/MIN/1.73M2 — LOW
EOSINOPHIL # BLD AUTO: 0.08 K/UL — SIGNIFICANT CHANGE UP (ref 0–0.5)
EOSINOPHIL NFR BLD AUTO: 0.6 % — SIGNIFICANT CHANGE UP (ref 0–6)
GLUCOSE SERPL-MCNC: 106 MG/DL — HIGH (ref 70–99)
HCT VFR BLD CALC: 38.5 % — LOW (ref 39–50)
HGB BLD-MCNC: 12.6 G/DL — LOW (ref 13–17)
HIV 1+2 AB+HIV1 P24 AG SERPL QL IA: SIGNIFICANT CHANGE UP
IMM GRANULOCYTES NFR BLD AUTO: 0.4 % — SIGNIFICANT CHANGE UP (ref 0–0.9)
INR BLD: 1.8 RATIO — HIGH (ref 0.88–1.16)
LYMPHOCYTES # BLD AUTO: 1.19 K/UL — SIGNIFICANT CHANGE UP (ref 1–3.3)
LYMPHOCYTES # BLD AUTO: 9.1 % — LOW (ref 13–44)
MCHC RBC-ENTMCNC: 29.7 PG — SIGNIFICANT CHANGE UP (ref 27–34)
MCHC RBC-ENTMCNC: 32.7 GM/DL — SIGNIFICANT CHANGE UP (ref 32–36)
MCV RBC AUTO: 90.8 FL — SIGNIFICANT CHANGE UP (ref 80–100)
MONOCYTES # BLD AUTO: 1.62 K/UL — HIGH (ref 0–0.9)
MONOCYTES NFR BLD AUTO: 12.4 % — SIGNIFICANT CHANGE UP (ref 2–14)
NEUTROPHILS # BLD AUTO: 10.05 K/UL — HIGH (ref 1.8–7.4)
NEUTROPHILS NFR BLD AUTO: 77.1 % — HIGH (ref 43–77)
PLATELET # BLD AUTO: 234 K/UL — SIGNIFICANT CHANGE UP (ref 150–400)
POTASSIUM SERPL-MCNC: 3.6 MMOL/L — SIGNIFICANT CHANGE UP (ref 3.5–5.3)
POTASSIUM SERPL-SCNC: 3.6 MMOL/L — SIGNIFICANT CHANGE UP (ref 3.5–5.3)
PROT SERPL-MCNC: 7.1 GM/DL — SIGNIFICANT CHANGE UP (ref 6–8.3)
PROTHROM AB SERPL-ACNC: 21 SEC — HIGH (ref 10.5–13.4)
RBC # BLD: 4.24 M/UL — SIGNIFICANT CHANGE UP (ref 4.2–5.8)
RBC # FLD: 14.9 % — HIGH (ref 10.3–14.5)
SODIUM SERPL-SCNC: 139 MMOL/L — SIGNIFICANT CHANGE UP (ref 135–145)
WBC # BLD: 13.04 K/UL — HIGH (ref 3.8–10.5)
WBC # FLD AUTO: 13.04 K/UL — HIGH (ref 3.8–10.5)

## 2022-09-20 PROCEDURE — 99233 SBSQ HOSP IP/OBS HIGH 50: CPT

## 2022-09-20 RX ORDER — POLYETHYLENE GLYCOL 3350 17 G/17G
17 POWDER, FOR SOLUTION ORAL DAILY
Refills: 0 | Status: DISCONTINUED | OUTPATIENT
Start: 2022-09-20 | End: 2022-09-22

## 2022-09-20 RX ORDER — AMLODIPINE BESYLATE 2.5 MG/1
5 TABLET ORAL DAILY
Refills: 0 | Status: DISCONTINUED | OUTPATIENT
Start: 2022-09-20 | End: 2022-09-22

## 2022-09-20 RX ORDER — SENNA PLUS 8.6 MG/1
2 TABLET ORAL AT BEDTIME
Refills: 0 | Status: DISCONTINUED | OUTPATIENT
Start: 2022-09-20 | End: 2022-09-22

## 2022-09-20 RX ORDER — CEFTRIAXONE 500 MG/1
2000 INJECTION, POWDER, FOR SOLUTION INTRAMUSCULAR; INTRAVENOUS EVERY 24 HOURS
Refills: 0 | Status: DISCONTINUED | OUTPATIENT
Start: 2022-09-20 | End: 2022-09-22

## 2022-09-20 RX ORDER — ENOXAPARIN SODIUM 100 MG/ML
40 INJECTION SUBCUTANEOUS EVERY 24 HOURS
Refills: 0 | Status: DISCONTINUED | OUTPATIENT
Start: 2022-09-20 | End: 2022-09-22

## 2022-09-20 RX ORDER — METRONIDAZOLE 500 MG
500 TABLET ORAL ONCE
Refills: 0 | Status: COMPLETED | OUTPATIENT
Start: 2022-09-20 | End: 2022-09-20

## 2022-09-20 RX ORDER — METRONIDAZOLE 500 MG
TABLET ORAL
Refills: 0 | Status: DISCONTINUED | OUTPATIENT
Start: 2022-09-20 | End: 2022-09-21

## 2022-09-20 RX ORDER — METRONIDAZOLE 500 MG
500 TABLET ORAL EVERY 8 HOURS
Refills: 0 | Status: DISCONTINUED | OUTPATIENT
Start: 2022-09-21 | End: 2022-09-21

## 2022-09-20 RX ORDER — SODIUM CHLORIDE 9 MG/ML
1000 INJECTION INTRAMUSCULAR; INTRAVENOUS; SUBCUTANEOUS ONCE
Refills: 0 | Status: COMPLETED | OUTPATIENT
Start: 2022-09-20 | End: 2022-09-20

## 2022-09-20 RX ORDER — ACETAMINOPHEN 500 MG
1000 TABLET ORAL ONCE
Refills: 0 | Status: COMPLETED | OUTPATIENT
Start: 2022-09-20 | End: 2022-09-20

## 2022-09-20 RX ADMIN — Medication 650 MILLIGRAM(S): at 16:02

## 2022-09-20 RX ADMIN — POLYETHYLENE GLYCOL 3350 17 GRAM(S): 17 POWDER, FOR SOLUTION ORAL at 10:55

## 2022-09-20 RX ADMIN — ATORVASTATIN CALCIUM 20 MILLIGRAM(S): 80 TABLET, FILM COATED ORAL at 21:27

## 2022-09-20 RX ADMIN — Medication 100 MILLIGRAM(S): at 21:27

## 2022-09-20 RX ADMIN — SODIUM CHLORIDE 100 MILLILITER(S): 9 INJECTION INTRAMUSCULAR; INTRAVENOUS; SUBCUTANEOUS at 01:10

## 2022-09-20 RX ADMIN — CEFTRIAXONE 100 MILLIGRAM(S): 500 INJECTION, POWDER, FOR SOLUTION INTRAMUSCULAR; INTRAVENOUS at 13:33

## 2022-09-20 RX ADMIN — MORPHINE SULFATE 2 MILLIGRAM(S): 50 CAPSULE, EXTENDED RELEASE ORAL at 11:27

## 2022-09-20 RX ADMIN — CEFTRIAXONE 100 MILLIGRAM(S): 500 INJECTION, POWDER, FOR SOLUTION INTRAMUSCULAR; INTRAVENOUS at 06:30

## 2022-09-20 RX ADMIN — MORPHINE SULFATE 2 MILLIGRAM(S): 50 CAPSULE, EXTENDED RELEASE ORAL at 09:52

## 2022-09-20 RX ADMIN — ONDANSETRON 4 MILLIGRAM(S): 8 TABLET, FILM COATED ORAL at 21:29

## 2022-09-20 RX ADMIN — SODIUM CHLORIDE 100 MILLILITER(S): 9 INJECTION INTRAMUSCULAR; INTRAVENOUS; SUBCUTANEOUS at 09:52

## 2022-09-20 RX ADMIN — Medication 650 MILLIGRAM(S): at 06:40

## 2022-09-20 RX ADMIN — Medication 650 MILLIGRAM(S): at 13:06

## 2022-09-20 RX ADMIN — Medication 1000 MILLIGRAM(S): at 16:02

## 2022-09-20 RX ADMIN — Medication 3 MILLIGRAM(S): at 21:27

## 2022-09-20 RX ADMIN — SODIUM CHLORIDE 1000 MILLILITER(S): 9 INJECTION INTRAMUSCULAR; INTRAVENOUS; SUBCUTANEOUS at 14:18

## 2022-09-20 RX ADMIN — SENNA PLUS 2 TABLET(S): 8.6 TABLET ORAL at 21:29

## 2022-09-20 RX ADMIN — ONDANSETRON 4 MILLIGRAM(S): 8 TABLET, FILM COATED ORAL at 13:33

## 2022-09-20 RX ADMIN — Medication 400 MILLIGRAM(S): at 14:19

## 2022-09-20 RX ADMIN — Medication 5 MILLIGRAM(S): at 21:27

## 2022-09-20 RX ADMIN — AMLODIPINE BESYLATE 2.5 MILLIGRAM(S): 2.5 TABLET ORAL at 09:51

## 2022-09-20 RX ADMIN — Medication 650 MILLIGRAM(S): at 07:00

## 2022-09-21 LAB
ANION GAP SERPL CALC-SCNC: 5 MMOL/L — SIGNIFICANT CHANGE UP (ref 5–17)
BUN SERPL-MCNC: 15 MG/DL — SIGNIFICANT CHANGE UP (ref 7–23)
C TRACH RRNA SPEC QL NAA+PROBE: SIGNIFICANT CHANGE UP
CALCIUM SERPL-MCNC: 8.7 MG/DL — SIGNIFICANT CHANGE UP (ref 8.5–10.1)
CHLORIDE SERPL-SCNC: 108 MMOL/L — SIGNIFICANT CHANGE UP (ref 96–108)
CO2 SERPL-SCNC: 23 MMOL/L — SIGNIFICANT CHANGE UP (ref 22–31)
CREAT SERPL-MCNC: 1.62 MG/DL — HIGH (ref 0.5–1.3)
EGFR: 57 ML/MIN/1.73M2 — LOW
GLUCOSE SERPL-MCNC: 111 MG/DL — HIGH (ref 70–99)
HCT VFR BLD CALC: 35.1 % — LOW (ref 39–50)
HGB BLD-MCNC: 11.6 G/DL — LOW (ref 13–17)
MCHC RBC-ENTMCNC: 29.7 PG — SIGNIFICANT CHANGE UP (ref 27–34)
MCHC RBC-ENTMCNC: 33 GM/DL — SIGNIFICANT CHANGE UP (ref 32–36)
MCV RBC AUTO: 90 FL — SIGNIFICANT CHANGE UP (ref 80–100)
N GONORRHOEA RRNA SPEC QL NAA+PROBE: SIGNIFICANT CHANGE UP
PLATELET # BLD AUTO: 222 K/UL — SIGNIFICANT CHANGE UP (ref 150–400)
POTASSIUM SERPL-MCNC: 4 MMOL/L — SIGNIFICANT CHANGE UP (ref 3.5–5.3)
POTASSIUM SERPL-SCNC: 4 MMOL/L — SIGNIFICANT CHANGE UP (ref 3.5–5.3)
RBC # BLD: 3.9 M/UL — LOW (ref 4.2–5.8)
RBC # FLD: 14.7 % — HIGH (ref 10.3–14.5)
SODIUM SERPL-SCNC: 136 MMOL/L — SIGNIFICANT CHANGE UP (ref 135–145)
WBC # BLD: 11.58 K/UL — HIGH (ref 3.8–10.5)
WBC # FLD AUTO: 11.58 K/UL — HIGH (ref 3.8–10.5)

## 2022-09-21 PROCEDURE — 99232 SBSQ HOSP IP/OBS MODERATE 35: CPT

## 2022-09-21 RX ADMIN — Medication 100 MILLIGRAM(S): at 05:31

## 2022-09-21 RX ADMIN — Medication 650 MILLIGRAM(S): at 01:07

## 2022-09-21 RX ADMIN — Medication 650 MILLIGRAM(S): at 00:12

## 2022-09-21 RX ADMIN — Medication 650 MILLIGRAM(S): at 09:30

## 2022-09-21 RX ADMIN — Medication 650 MILLIGRAM(S): at 18:38

## 2022-09-21 RX ADMIN — ATORVASTATIN CALCIUM 20 MILLIGRAM(S): 80 TABLET, FILM COATED ORAL at 21:22

## 2022-09-21 RX ADMIN — Medication 3 MILLIGRAM(S): at 21:22

## 2022-09-21 RX ADMIN — AMLODIPINE BESYLATE 5 MILLIGRAM(S): 2.5 TABLET ORAL at 09:30

## 2022-09-21 RX ADMIN — ENOXAPARIN SODIUM 40 MILLIGRAM(S): 100 INJECTION SUBCUTANEOUS at 09:36

## 2022-09-21 RX ADMIN — CEFTRIAXONE 100 MILLIGRAM(S): 500 INJECTION, POWDER, FOR SOLUTION INTRAMUSCULAR; INTRAVENOUS at 12:25

## 2022-09-21 RX ADMIN — SODIUM CHLORIDE 100 MILLILITER(S): 9 INJECTION INTRAMUSCULAR; INTRAVENOUS; SUBCUTANEOUS at 05:31

## 2022-09-21 RX ADMIN — Medication 650 MILLIGRAM(S): at 11:31

## 2022-09-21 RX ADMIN — SODIUM CHLORIDE 100 MILLILITER(S): 9 INJECTION INTRAMUSCULAR; INTRAVENOUS; SUBCUTANEOUS at 12:16

## 2022-09-21 NOTE — CDI QUERY NOTE - NSCDIOTHERTXTBX_GEN_ALL_CORE_HH
This patient was admitted with pyelonephritis and "urosepsis".    Progress Note Adult Hospitalist Attending 9-20-22 @ 18:23  # Pyelonephritis/ Uerteritis/ Urosepsis:    -CT abdomen/pelvis noted for ureteritis and bilateral pyeloneprhritis  Cont IV ABX.  Change rocephin to 2g daily.    36y old  Male who presents with a chief complaint of Pyelonephritis/ Urosepsis/ Abdominal pain      Adult 9-19-22 @ 22:41  # Pyelonephritis/ Urosepsis  -CT abdomen/pelvis noted for ureteritis and bilateral pyeloneprhritis    This patient met 2 SIRS criteria on admission:  WBC Count: 13.52 K/uL (09.19.22 @ 20:44)  HR @ 122 on 9/19/2022 @ 18:19    Is the above clinical criteria indicative of a further diagnosis?  A) Sepsis, present on admission.  B) Sepsis, ruled out.  C) Other, please specify:  D) Unable to determine.

## 2022-09-21 NOTE — CONSULT NOTE ADULT - ASSESSMENT
36 year old male patient presented to the ED complaining of a 3 day history of abdominal pain. Patient endorses a left sided flank/ lower abdominal pain associated with fever, chills and nausea. He endorsed an initially intermittent pain that was localized and felt like a " vibration". Denies associated urinary frequency, urgency, hematuria or dysuria. Last bowel movement 3 days ago. No trauma noted. Patient was seen and evaluated in the ED 3 days ago and diagnosed with a muscular strain- states pain persisted. He followed up with his PCP and returned when no pain relief was noted. Patient with little concern for possible STI- states he is sexually active with two partners but is asymptomatic. In the ED patient found to have leukocytosis and tachycardia. CT abd/pelvis revealed: Findings suspicious for left ureteritis and bilateral polynephritis, left greater than right. Recommend correlation with urinalysis. Differential diagnosis includes renal infarcts, but considered less likely given the degree of inflammatory stranding. Was given IV rocephin.     1. L ureteritis/bilateral pyelonephritis   - imaging reviewed   - urine cx, blood cx no growth  - on rocephin 2gm daily   - continue with abx coverage   - monitor temps  - dc flagyl check syphillis screen, hep panel for STI w/u, GC/Chlamydia (-), HIV (-)  - tolerating abx well so far; no side effects noted  - reason for abx use and side effects reviewed with patient  - supportive care  - fu cbc    2. other issues - care per medicine

## 2022-09-21 NOTE — CONSULT NOTE ADULT - SUBJECTIVE AND OBJECTIVE BOX
Patient is a 36y old  Male who presents with a chief complaint of Pyelonephritis  Urosepsis  Abdominal pain (20 Sep 2022 18:23)    HPI:  36 year old male patient presented to the ED complaining of a 3 day history of abdominal pain. Patient endorses a left sided flank/ lower abdominal pain associated with fever, chills and nausea. He endorsed an initially intermittent pain that was localized and felt like a " vibration". Denies associated urinary frequency, urgency, hematuria or dysuria. Last bowel movement 3 days ago. No trauma noted. Patient was seen and evaluated in the ED 3 days ago and diagnosed with a muscular strain- states pain persisted. He followed up with his PCP today and returned when no pain relief was noted. Patient with little concern for possible STI- states he is sexually active with two partners but is asymptomatic.      In the ED patient found to have leukocytosis and tachycardia. CT abd/pelvis revealed: Findings suspicious for left ureteritis and bilateral polynephritis, left   greater than right. Recommend correlation with urinalysis. Differential   diagnosis includes renal infarcts, but considered less likely given the   degree of inflammatory stranding.   (19 Sep 2022 22:41)      PMH: as above  PSH: as above  Meds: per reconciliation sheet, noted below  MEDICATIONS  (STANDING):  amLODIPine   Tablet 5 milliGRAM(s) Oral daily  atorvastatin 20 milliGRAM(s) Oral at bedtime  bisacodyl 5 milliGRAM(s) Oral at bedtime  cefTRIAXone   IVPB 2000 milliGRAM(s) IV Intermittent every 24 hours  enoxaparin Injectable 40 milliGRAM(s) SubCutaneous every 24 hours  polyethylene glycol 3350 17 Gram(s) Oral daily  senna 2 Tablet(s) Oral at bedtime  sodium chloride 0.9%. 1000 milliLiter(s) (100 mL/Hr) IV Continuous <Continuous>    MEDICATIONS  (PRN):  acetaminophen     Tablet .. 650 milliGRAM(s) Oral every 6 hours PRN Temp greater or equal to 38C (100.4F), Mild Pain (1 - 3)  aluminum hydroxide/magnesium hydroxide/simethicone Suspension 30 milliLiter(s) Oral every 4 hours PRN Dyspepsia  melatonin 3 milliGRAM(s) Oral at bedtime PRN Insomnia  morphine  - Injectable 2 milliGRAM(s) IV Push every 4 hours PRN Severe Pain (7 - 10)  ondansetron Injectable 4 milliGRAM(s) IV Push every 8 hours PRN Nausea and/or Vomiting    Allergies    penicillins (Unknown)    Intolerances      Social: no smoking, no alcohol, no illegal drugs; no recent travel, no exposure to TB  FAMILY HISTORY:  FH: CAD (coronary artery disease)    FH: type 2 diabetes    FH: HTN (hypertension)       no history of premature cardiovascular disease in first degree relatives    ROS: the patient denies fever, no chills, no HA, no dizziness, no sore throat, no blurry vision, no CP, no palpitations, no SOB, no cough, no abdominal pain, no diarrhea, no N/V, no dysuria, no leg pain, no claudication, no rash, no joint aches, no rectal pain or bleeding, no night sweats  All other systems reviewed and are negative    Vital Signs Last 24 Hrs  T(C): 36.9 (21 Sep 2022 08:48), Max: 38.8 (20 Sep 2022 13:41)  T(F): 98.4 (21 Sep 2022 08:48), Max: 101.9 (20 Sep 2022 13:41)  HR: 108 (21 Sep 2022 08:48) (108 - 122)  BP: 140/97 (21 Sep 2022 08:48) (140/97 - 148/103)  BP(mean): --  RR: 16 (21 Sep 2022 08:48) (14 - 18)  SpO2: 98% (21 Sep 2022 08:48) (95% - 98%)    Parameters below as of 21 Sep 2022 08:48  Patient On (Oxygen Delivery Method): room air      Daily     Daily     PE:    Constitutional: frail looking  HEENT: NC/AT, EOMI, PERRLA, conjunctivae clear; ears and nose atraumatic; pharynx benign  Neck: supple; thyroid not palpable  Back: no tenderness  Respiratory: respiratory effort normal; clear to auscultation  Cardiovascular: S1S2 regular, no murmurs  Abdomen: soft, not tender, not distended, positive BS; liver and spleen WNL  Genitourinary: no suprapubic tenderness  Lymphatic: no LN palpable  Musculoskeletal: no muscle tenderness, no joint swelling or tenderness  Extremities: no pedal edema  Neurological/ Psychiatric: AxOx3, Judgement and insight normal;  moving all extremities  Skin: no rashes; no palpable lesions    Labs: all available labs reviewed                        11.6   1158 )-----------( 222      ( 21 Sep 2022 07:34 )             35.1     -    136  |  108  |  15  ----------------------------<  111<H>  4.0   |  23  |  1.62<H>    Ca    8.7      21 Sep 2022 07:34    TPro  7.1  /  Alb  2.8<L>  /  TBili  1.6<H>  /  DBili  x   /  AST  47<H>  /  ALT  64  /  AlkPhos  70  09-20     LIVER FUNCTIONS - ( 20 Sep 2022 07:30 )  Alb: 2.8 g/dL / Pro: 7.1 gm/dL / ALK PHOS: 70 U/L / ALT: 64 U/L / AST: 47 U/L / GGT: x           Urinalysis Basic - ( 19 Sep 2022 20:11 )    Color: Yellow / Appearance: Clear / S.010 / pH: x  Gluc: x / Ketone: Negative  / Bili: Negative / Urobili: 1   Blood: x / Protein: 100 / Nitrite: Negative   Leuk Esterase: Negative / RBC: 0-2 /HPF / WBC 0-2   Sq Epi: x / Non Sq Epi: Few / Bacteria: Negative          Radiology: all available radiological tests reviewed    Advanced directives addressed: full resuscitation Patient is a 36y old  Male who presents with a chief complaint of Pyelonephritis  Urosepsis  Abdominal pain (20 Sep 2022 18:23)    HPI:  36 year old male patient presented to the ED complaining of a 3 day history of abdominal pain. Patient endorses a left sided flank/ lower abdominal pain associated with fever, chills and nausea. He endorsed an initially intermittent pain that was localized and felt like a " vibration". Denies associated urinary frequency, urgency, hematuria or dysuria. Last bowel movement 3 days ago. No trauma noted. Patient was seen and evaluated in the ED 3 days ago and diagnosed with a muscular strain- states pain persisted. He followed up with his PCP and returned when no pain relief was noted. Patient with little concern for possible STI- states he is sexually active with two partners but is asymptomatic. In the ED patient found to have leukocytosis and tachycardia. CT abd/pelvis revealed: Findings suspicious for left ureteritis and bilateral polynephritis, left greater than right. Recommend correlation with urinalysis. Differential diagnosis includes renal infarcts, but considered less likely given the degree of inflammatory stranding. Was given IV rocephin.       PMH: as above  PSH: as above  Meds: per reconciliation sheet, noted below  MEDICATIONS  (STANDING):  amLODIPine   Tablet 5 milliGRAM(s) Oral daily  atorvastatin 20 milliGRAM(s) Oral at bedtime  bisacodyl 5 milliGRAM(s) Oral at bedtime  cefTRIAXone   IVPB 2000 milliGRAM(s) IV Intermittent every 24 hours  enoxaparin Injectable 40 milliGRAM(s) SubCutaneous every 24 hours  polyethylene glycol 3350 17 Gram(s) Oral daily  senna 2 Tablet(s) Oral at bedtime  sodium chloride 0.9%. 1000 milliLiter(s) (100 mL/Hr) IV Continuous <Continuous>    Allergies    penicillins (Unknown)    Intolerances      Social: no smoking, no alcohol, no illegal drugs; no recent travel, no exposure to TB  FAMILY HISTORY:  FH: CAD (coronary artery disease)    FH: type 2 diabetes    FH: HTN (hypertension)       no history of premature cardiovascular disease in first degree relatives    ROS: the patient denies fever, no chills, no HA, no dizziness, no sore throat, no blurry vision, no CP, no palpitations, no SOB, no cough, + abdominal pain, no diarrhea, no N/V, no dysuria, no leg pain, no claudication, no rash, no joint aches, no rectal pain or bleeding, no night sweats  All other systems reviewed and are negative    Vital Signs Last 24 Hrs  T(C): 36.9 (21 Sep 2022 08:48), Max: 38.8 (20 Sep 2022 13:41)  T(F): 98.4 (21 Sep 2022 08:48), Max: 101.9 (20 Sep 2022 13:41)  HR: 108 (21 Sep 2022 08:48) (108 - 122)  BP: 140/97 (21 Sep 2022 08:48) (140/97 - 148/103)  BP(mean): --  RR: 16 (21 Sep 2022 08:48) (14 - 18)  SpO2: 98% (21 Sep 2022 08:48) (95% - 98%)    Parameters below as of 21 Sep 2022 08:48  Patient On (Oxygen Delivery Method): room air      PE:  Constitutional: NAD   HEENT: NC/AT, EOMI, PERRLA, conjunctivae clear; ears and nose atraumatic; pharynx benign  Neck: supple; thyroid not palpable  Back: no tenderness  Respiratory: respiratory effort normal; clear to auscultation  Cardiovascular: S1S2 regular, no murmurs  Abdomen: soft, not tender, not distended, positive BS; liver and spleen WNL  Genitourinary: no suprapubic tenderness  Lymphatic: no LN palpable  Musculoskeletal: no muscle tenderness, no joint swelling or tenderness  Extremities: no pedal edema  Neurological/ Psychiatric: AxOx3, Judgement and insight normal;  moving all extremities  Skin: no rashes; no palpable lesions    Labs: all available labs reviewed                        6   58 )-----------( 222      ( 21 Sep 2022 07:34 )             35.1     09-21    136  |  108  |  15  ----------------------------<  111<H>  4.0   |  23  |  1.62<H>    Ca    8.7      21 Sep 2022 07:34    TPro  7.1  /  Alb  2.8<L>  /  TBili  1.6<H>  /  DBili  x   /  AST  47<H>  /  ALT  64  /  AlkPhos  70  09-20     LIVER FUNCTIONS - ( 20 Sep 2022 07:30 )  Alb: 2.8 g/dL / Pro: 7.1 gm/dL / ALK PHOS: 70 U/L / ALT: 64 U/L / AST: 47 U/L / GGT: x           Urinalysis Basic - ( 19 Sep 2022 20:11 )    Color: Yellow / Appearance: Clear / S.010 / pH: x  Gluc: x / Ketone: Negative  / Bili: Negative / Urobili: 1   Blood: x / Protein: 100 / Nitrite: Negative   Leuk Esterase: Negative / RBC: 0-2 /HPF / WBC 0-2   Sq Epi: x / Non Sq Epi: Few / Bacteria: Negative          Radiology: all available radiological tests reviewed    Advanced directives addressed: full resuscitation

## 2022-09-22 ENCOUNTER — TRANSCRIPTION ENCOUNTER (OUTPATIENT)
Age: 37
End: 2022-09-22

## 2022-09-22 VITALS
RESPIRATION RATE: 18 BRPM | SYSTOLIC BLOOD PRESSURE: 133 MMHG | DIASTOLIC BLOOD PRESSURE: 89 MMHG | TEMPERATURE: 98 F | OXYGEN SATURATION: 100 % | HEART RATE: 101 BPM

## 2022-09-22 LAB
ANION GAP SERPL CALC-SCNC: 6 MMOL/L — SIGNIFICANT CHANGE UP (ref 5–17)
BUN SERPL-MCNC: 13 MG/DL — SIGNIFICANT CHANGE UP (ref 7–23)
CALCIUM SERPL-MCNC: 8.9 MG/DL — SIGNIFICANT CHANGE UP (ref 8.5–10.1)
CHLORIDE SERPL-SCNC: 108 MMOL/L — SIGNIFICANT CHANGE UP (ref 96–108)
CO2 SERPL-SCNC: 23 MMOL/L — SIGNIFICANT CHANGE UP (ref 22–31)
CREAT SERPL-MCNC: 1.43 MG/DL — HIGH (ref 0.5–1.3)
EGFR: 66 ML/MIN/1.73M2 — SIGNIFICANT CHANGE UP
GLUCOSE SERPL-MCNC: 133 MG/DL — HIGH (ref 70–99)
HCT VFR BLD CALC: 37.1 % — LOW (ref 39–50)
HGB BLD-MCNC: 12 G/DL — LOW (ref 13–17)
MCHC RBC-ENTMCNC: 29.1 PG — SIGNIFICANT CHANGE UP (ref 27–34)
MCHC RBC-ENTMCNC: 32.3 GM/DL — SIGNIFICANT CHANGE UP (ref 32–36)
MCV RBC AUTO: 89.8 FL — SIGNIFICANT CHANGE UP (ref 80–100)
PLATELET # BLD AUTO: 272 K/UL — SIGNIFICANT CHANGE UP (ref 150–400)
POTASSIUM SERPL-MCNC: 3.9 MMOL/L — SIGNIFICANT CHANGE UP (ref 3.5–5.3)
POTASSIUM SERPL-SCNC: 3.9 MMOL/L — SIGNIFICANT CHANGE UP (ref 3.5–5.3)
RBC # BLD: 4.13 M/UL — LOW (ref 4.2–5.8)
RBC # FLD: 14.5 % — SIGNIFICANT CHANGE UP (ref 10.3–14.5)
SODIUM SERPL-SCNC: 137 MMOL/L — SIGNIFICANT CHANGE UP (ref 135–145)
WBC # BLD: 7.27 K/UL — SIGNIFICANT CHANGE UP (ref 3.8–10.5)
WBC # FLD AUTO: 7.27 K/UL — SIGNIFICANT CHANGE UP (ref 3.8–10.5)

## 2022-09-22 PROCEDURE — 99239 HOSP IP/OBS DSCHRG MGMT >30: CPT

## 2022-09-22 RX ORDER — AMLODIPINE BESYLATE 2.5 MG/1
1 TABLET ORAL
Qty: 30 | Refills: 0
Start: 2022-09-22

## 2022-09-22 RX ORDER — AMLODIPINE BESYLATE 2.5 MG/1
1 TABLET ORAL
Qty: 0 | Refills: 0 | DISCHARGE

## 2022-09-22 RX ORDER — CEFUROXIME AXETIL 250 MG
1 TABLET ORAL
Qty: 14 | Refills: 0
Start: 2022-09-22

## 2022-09-22 RX ADMIN — ENOXAPARIN SODIUM 40 MILLIGRAM(S): 100 INJECTION SUBCUTANEOUS at 09:57

## 2022-09-22 RX ADMIN — AMLODIPINE BESYLATE 5 MILLIGRAM(S): 2.5 TABLET ORAL at 09:57

## 2022-09-22 RX ADMIN — CEFTRIAXONE 100 MILLIGRAM(S): 500 INJECTION, POWDER, FOR SOLUTION INTRAMUSCULAR; INTRAVENOUS at 12:17

## 2022-09-22 NOTE — DISCHARGE NOTE NURSING/CASE MANAGEMENT/SOCIAL WORK - PATIENT PORTAL LINK FT
You can access the FollowMyHealth Patient Portal offered by [facility] by registering at the following website: [email or web address]. By joining [facility]’s FollowMyHealth portal, you will also be able to view your health information using other applications (apps) compatible with our system.

## 2022-09-22 NOTE — PROGRESS NOTE ADULT - SUBJECTIVE AND OBJECTIVE BOX
36y old  Male who presents with a chief complaint of Pyelonephritis/ Urosepsis/ Abdominal pain     HPI:  36 year old male patient presented to the ED complaining of a 3 day history of abdominal pain. Patient endorses a left sided flank/ lower abdominal pain associated with fever, chills and nausea. He endorsed an initially intermittent pain that was localized and felt like a " vibration". Denies associated urinary frequency, urgency, hematuria or dysuria. Last bowel movement 3 days ago. No trauma noted. Patient was seen and evaluated in the ED 3 days ago and diagnosed with a muscular strain- states pain persisted. He followed up with his PCP today and returned when no pain relief was noted. Patient with little concern for possible STI- states he is sexually active with two partners but is asymptomatic.  In the ED patient found to have leukocytosis and tachycardia. CT abd/pelvis revealed: Findings suspicious for left ureteritis and bilateral polynephritis, left greater than right. Recommend correlation with urinalysis.    :  PT seen.  Still febrile.  Flank pain improving.    :  Pt seen.  Fevers improving.  Pain improving.  Still feeling bloated.      Review of system- All 10 systems reviewed and is as per HPI otherwise negative.     Vital Signs Last 24 Hrs  T(C): 37.2 (21 Sep 2022 16:27), Max: 38 (20 Sep 2022 18:08)  T(F): 99 (21 Sep 2022 16:27), Max: 100.4 (20 Sep 2022 18:08)  HR: 106 (21 Sep 2022 16:27) (106 - 117)  BP: 152/101 (21 Sep 2022 16:27) (140/97 - 152/101)  BP(mean): --  RR: 18 (21 Sep 2022 16:27) (16 - 18)  SpO2: 100% (21 Sep 2022 16:27) (98% - 100%)    Parameters below as of 21 Sep 2022 16:27  Patient On (Oxygen Delivery Method): room air    PHYSICAL EXAM:  GENERAL: Comfortable, no acute distress  HEAD:  Atraumatic, Normocephalic  EYES: EOMI, PERRLA  HEENT: Moist mucous membranes  NECK: Supple, No JVD  NERVOUS SYSTEM:  Alert & Oriented X3, Motor Strength 5/5 B/L upper and lower extremities  CHEST/LUNG: Clear to auscultation bilaterally  HEART: Regular rate and rhythm; No murmurs, rubs, or gallops  ABDOMEN: Soft, Nontender, Nondistended; Bowel sounds present  GENITOURINARY- Voiding, no palpable bladder  EXTREMITIES:  No clubbing, cyanosis, or edema  MUSCULOSKELTAL- No muscle tenderness, No joint tenderness  SKIN-no rash    LABS:      136  |  108  |  15  ----------------------------<  111<H>  4.0   |  23  |  1.62<H>    Ca    8.7      21 Sep 2022 07:34    TPro  7.1  /  Alb  2.8<L>  /  TBili  1.6<H>  /  DBili  x   /  AST  47<H>  /  ALT  64  /  AlkPhos  70                          11.6   11.58 )-----------( 222      ( 21 Sep 2022 07:34 )             35.1     LIVER FUNCTIONS - ( 20 Sep 2022 07:30 )  Alb: 2.8 g/dL / Pro: 7.1 gm/dL / ALK PHOS: 70 U/L / ALT: 64 U/L / AST: 47 U/L / GGT: x           PT/INR - ( 20 Sep 2022 07:30 )   PT: 21.0 sec;   INR: 1.80 ratio      Urinalysis Basic - ( 19 Sep 2022 20:11 )  Color: Yellow / Appearance: Clear / S.010 / pH: xc  Gluc: x / Ketone: Negative  / Bili: Negative / Urobili: 1   Blood: x / Protein: 100 / Nitrite: Negative   Leuk Esterase: Negative / RBC: 0-2 /HPF / WBC 0-2   Sq Epi: x / Non Sq Epi: Few / Bacteria: Negative      MEDICATIONS  (STANDING):  amLODIPine   Tablet 5 milliGRAM(s) Oral daily  atorvastatin 20 milliGRAM(s) Oral at bedtime  bisacodyl 5 milliGRAM(s) Oral at bedtime  cefTRIAXone   IVPB 2000 milliGRAM(s) IV Intermittent every 24 hours  enoxaparin Injectable 40 milliGRAM(s) SubCutaneous every 24 hours  polyethylene glycol 3350 17 Gram(s) Oral daily  senna 2 Tablet(s) Oral at bedtime    MEDICATIONS  (PRN):  acetaminophen     Tablet .. 650 milliGRAM(s) Oral every 6 hours PRN Temp greater or equal to 38C (100.4F), Mild Pain (1 - 3)  aluminum hydroxide/magnesium hydroxide/simethicone Suspension 30 milliLiter(s) Oral every 4 hours PRN Dyspepsia  melatonin 3 milliGRAM(s) Oral at bedtime PRN Insomnia  morphine  - Injectable 2 milliGRAM(s) IV Push every 4 hours PRN Severe Pain (7 - 10)  ondansetron Injectable 4 milliGRAM(s) IV Push every 8 hours PRN Nausea and/or Vomiting  
Date of service: 22 @ 12:46    pt seen and examined  no fevers  urinating ok, no dysuria  no flank pain      ROS: no fever or chills; denies dizziness, no HA, no SOB or cough, no abdominal pain, no diarrhea or constipation; no legs pain, no rashes    MEDICATIONS  (STANDING):  amLODIPine   Tablet 5 milliGRAM(s) Oral daily  atorvastatin 20 milliGRAM(s) Oral at bedtime  bisacodyl 5 milliGRAM(s) Oral at bedtime  cefTRIAXone   IVPB 2000 milliGRAM(s) IV Intermittent every 24 hours  enoxaparin Injectable 40 milliGRAM(s) SubCutaneous every 24 hours  polyethylene glycol 3350 17 Gram(s) Oral daily  senna 2 Tablet(s) Oral at bedtime      Vital Signs Last 24 Hrs  T(C): 36.5 (22 Sep 2022 07:59), Max: 37.2 (21 Sep 2022 16:27)  T(F): 97.7 (22 Sep 2022 07:59), Max: 99 (21 Sep 2022 16:27)  HR: 106 (22 Sep 2022 07:59) (102 - 106)  BP: 161/103 (22 Sep 2022 07:59) (137/84 - 161/103)  BP(mean): --  RR: 18 (22 Sep 2022 07:59) (17 - 18)  SpO2: 98% (22 Sep 2022 07:59) (98% - 100%)    Parameters below as of 22 Sep 2022 07:59  Patient On (Oxygen Delivery Method): room air    PE:  Constitutional: NAD   HEENT: NC/AT, EOMI, PERRLA, conjunctivae clear; ears and nose atraumatic; pharynx benign  Neck: supple; thyroid not palpable  Back: no tenderness  Respiratory: respiratory effort normal; clear to auscultation  Cardiovascular: S1S2 regular, no murmurs  Abdomen: soft, not tender, not distended, positive BS; liver and spleen WNL  Genitourinary: no suprapubic tenderness  Lymphatic: no LN palpable  Musculoskeletal: no muscle tenderness, no joint swelling or tenderness  Extremities: no pedal edema  Neurological/ Psychiatric: AxOx3, Judgement and insight normal;  moving all extremities  Skin: no rashes; no palpable lesions    Labs: all available labs reviewed                        12.0   7.27  )-----------( 272      ( 22 Sep 2022 08:13 )             37.1         137  |  108  |  13  ----------------------------<  133<H>  3.9   |  23  |  1.43<H>    Ca    8.9      22 Sep 2022 08:13        Urinalysis Basic - ( 19 Sep 2022 20:11 )    Color: Yellow / Appearance: Clear / S.010 / pH: x  Gluc: x / Ketone: Negative  / Bili: Negative / Urobili: 1   Blood: x / Protein: 100 / Nitrite: Negative   Leuk Esterase: Negative / RBC: 0-2 /HPF / WBC 0-2   Sq Epi: x / Non Sq Epi: Few / Bacteria: Negative    Culture - Blood (22 @ 20:44)   Specimen Source: .Blood None   Culture Results:   No growth to date.   Culture - Blood (22 @ 20:44)   Specimen Source: .Blood None   Culture Results:   No growth to date.   Culture - Urine (22 @ 14:43)   Specimen Source: Clean Catch None   Culture Results:   No growth     Radiology: all available radiological tests reviewed    Advanced directives addressed: full resuscitation
36y old  Male who presents with a chief complaint of Pyelonephritis/ Urosepsis/ Abdominal pain     HPI:  36 year old male patient presented to the ED complaining of a 3 day history of abdominal pain. Patient endorses a left sided flank/ lower abdominal pain associated with fever, chills and nausea. He endorsed an initially intermittent pain that was localized and felt like a " vibration". Denies associated urinary frequency, urgency, hematuria or dysuria. Last bowel movement 3 days ago. No trauma noted. Patient was seen and evaluated in the ED 3 days ago and diagnosed with a muscular strain- states pain persisted. He followed up with his PCP today and returned when no pain relief was noted. Patient with little concern for possible STI- states he is sexually active with two partners but is asymptomatic.  In the ED patient found to have leukocytosis and tachycardia. CT abd/pelvis revealed: Findings suspicious for left ureteritis and bilateral polynephritis, left greater than right. Recommend correlation with urinalysis.    :  PT seen.  Still febrile.  Flank pain improving.      Review of system- All 10 systems reviewed and is as per HPI otherwise negative.     T(C): 38 (22 @ 18:08), Max: 38.8 (22 @ 13:41)  HR: 116 (22 @ 18:08) (102 - 122)  BP: 142/92 (22 @ 18:08) (133/94 - 149/98)  RR: 16 (22 @ 18:08) (14 - 19)  SpO2: 98% (22 @ 18:08) (95% - 99%)    PHYSICAL EXAM:  GENERAL: Comfortable, no acute distress  HEAD:  Atraumatic, Normocephalic  EYES: EOMI, PERRLA  HEENT: Moist mucous membranes  NECK: Supple, No JVD  NERVOUS SYSTEM:  Alert & Oriented X3, Motor Strength 5/5 B/L upper and lower extremities  CHEST/LUNG: Clear to auscultation bilaterally  HEART: Regular rate and rhythm; No murmurs, rubs, or gallops  ABDOMEN: Soft, Nontender, Nondistended; Bowel sounds present  GENITOURINARY- Voiding, no palpable bladder  EXTREMITIES:  No clubbing, cyanosis, or edema  MUSCULOSKELTAL- No muscle tenderness, No joint tenderness  SKIN-no rash    LABS:                        12.6   13.04 )-----------( 234      ( 20 Sep 2022 07:30 )             38.5     09-20    139  |  109<H>  |  15  ----------------------------<  106<H>  3.6   |  22  |  1.66<H>    Ca    8.6      20 Sep 2022 07:30    TPro  7.1  /  Alb  2.8<L>  /  TBili  1.6<H>  /  DBili  x   /  AST  47<H>  /  ALT  64  /  AlkPhos  70  09-20    PT/INR - ( 20 Sep 2022 07:30 )   PT: 21.0 sec;   INR: 1.80 ratio           Urinalysis Basic - ( 19 Sep 2022 20:11 )    Color: Yellow / Appearance: Clear / S.010 / pH: x  Gluc: x / Ketone: Negative  / Bili: Negative / Urobili: 1   Blood: x / Protein: 100 / Nitrite: Negative   Leuk Esterase: Negative / RBC: 0-2 /HPF / WBC 0-2   Sq Epi: x / Non Sq Epi: Few / Bacteria: Negative        MEDS  acetaminophen     Tablet .. 650 milliGRAM(s) Oral every 6 hours PRN  aluminum hydroxide/magnesium hydroxide/simethicone Suspension 30 milliLiter(s) Oral every 4 hours PRN  amLODIPine   Tablet 5 milliGRAM(s) Oral daily  atorvastatin 20 milliGRAM(s) Oral at bedtime  bisacodyl 5 milliGRAM(s) Oral at bedtime  cefTRIAXone   IVPB 2000 milliGRAM(s) IV Intermittent every 24 hours  enoxaparin Injectable 40 milliGRAM(s) SubCutaneous every 24 hours  melatonin 3 milliGRAM(s) Oral at bedtime PRN  metroNIDAZOLE  IVPB      morphine  - Injectable 2 milliGRAM(s) IV Push every 4 hours PRN  ondansetron Injectable 4 milliGRAM(s) IV Push every 8 hours PRN  polyethylene glycol 3350 17 Gram(s) Oral daily  senna 2 Tablet(s) Oral at bedtime  sodium chloride 0.9%. 1000 milliLiter(s) IV Continuous <Continuous>

## 2022-09-22 NOTE — PROGRESS NOTE ADULT - REASON FOR ADMISSION
Pyelonephritis  Urosepsis  Abdominal pain

## 2022-09-22 NOTE — DISCHARGE NOTE PROVIDER - HOSPITAL COURSE
# Pyelonephritis/ Uerteritis/ Urosepsis:    CT abdomen/pelvis noted for ureteritis and bilateral pyeloneprhritis  Urine and blood cultures negative  DC home on ceftin      # MARIANELA versus CKD:    - Cr 1.4  - Fu with outpatient PCP    # HTN   - norvasc    # HLD  -on statin

## 2022-09-22 NOTE — DISCHARGE NOTE PROVIDER - NSDCCPCAREPLAN_GEN_ALL_CORE_FT
PRINCIPAL DISCHARGE DIAGNOSIS  Diagnosis: Sepsis due to urinary tract infection  Assessment and Plan of Treatment: You have an infection of your kidneys. Please finish antibiotics as prescribed.  Also, please fu with you PCP for repeat labs for your kidneys.  Stay well hydrated      SECONDARY DISCHARGE DIAGNOSES  Diagnosis: Acute pyelonephritis  Assessment and Plan of Treatment:

## 2022-09-22 NOTE — DISCHARGE NOTE NURSING/CASE MANAGEMENT/SOCIAL WORK - NSDCPEFALRISK_GEN_ALL_CORE
For information on Fall & Injury Prevention, visit: [email or web address]  [email or web address] OR  https://www.cdc.gov/steadi/patient.html

## 2022-09-22 NOTE — PROGRESS NOTE ADULT - ASSESSMENT
36 year old male who presents with fever/ flank pain found to have urosepsis/ pyelonephritis.    # Pyelonephritis/ Uerteritis/ Urosepsis:    -CT abdomen/pelvis noted for ureteritis and bilateral pyeloneprhritis  Cont IV ABX.  Change rocephin to 2g daily.    Add flagyl for possible STD coverage.    F/u urine, blood, GC cultures.    HIV testing as admits to unprotected sex.    ID eval.    Fevers/ tachycardia-- fluid boluses/ IV tylenol.      #Nausea/ Vomiting:    Suspect seconary to pyelo.    PRN zofran.      # MARIANELA  -likely pre-renal  -IVF hydration  -trend kidney function    # HTN  -on norvasc    # HLD  -on statin    #Constipation:    Miralax/ Dulcolax.      # DVT ppx  lovenox  
36 year old male who presents with fever/ flank pain found to have urosepsis/ pyelonephritis.    # Pyelonephritis/ Uerteritis/ Urosepsis:    CT abdomen/pelvis noted for ureteritis and bilateral pyeloneprhritis  Cont IV ABX.  Change rocephin to 2g daily.    Urine/ blood cultures negative to date.    Chlam/ Gonorr negative.    F/u RPR and hepatitis.    HIV negative.    PAtient admits to unprotected sex.    ID eval.    Fevers/ tachycardia-- fluid boluses/ IV tylenol.      #Nausea/ Vomiting:    Suspect seconary to pyelo.    PRN zofran.    Improved 9/21.      # MARIANELA versus CKD:    Admission 1.4-- up to 1.6.    Maybe related to CAROLYN as contrast load on admission.    Trend.    Avoid NSAIDS / nephrotoxic drugs.      # HTN  -on norvasc-- increased to 5 daily.    DASH diet.      # HLD  -on statin    #Constipation:    Miralax/ Dulcolax.      # DVT ppx  lovenox  
36 year old male patient presented to the ED complaining of a 3 day history of abdominal pain. Patient endorses a left sided flank/ lower abdominal pain associated with fever, chills and nausea. He endorsed an initially intermittent pain that was localized and felt like a " vibration". Denies associated urinary frequency, urgency, hematuria or dysuria. Last bowel movement 3 days ago. No trauma noted. Patient was seen and evaluated in the ED 3 days ago and diagnosed with a muscular strain- states pain persisted. He followed up with his PCP and returned when no pain relief was noted. Patient with little concern for possible STI- states he is sexually active with two partners but is asymptomatic. In the ED patient found to have leukocytosis and tachycardia. CT abd/pelvis revealed: Findings suspicious for left ureteritis and bilateral polynephritis, left greater than right. Recommend correlation with urinalysis. Differential diagnosis includes renal infarcts, but considered less likely given the degree of inflammatory stranding. Was given IV rocephin.     1. L ureteritis/bilateral pyelonephritis   - imaging reviewed   - urine cx, blood cx no growth  - on rocephin 2gm daily #4  - continue with abx coverage   - dc with oral ceftin complete 10 day course   - monitor temps  - f/u syphillis screen, hep panel for STI w/u, GC/Chlamydia (-), HIV (-)  - tolerating abx well so far; no side effects noted  - reason for abx use and side effects reviewed with patient  - supportive care  - fu cbc    2. other issues - care per medicine

## 2022-09-25 LAB
CULTURE RESULTS: SIGNIFICANT CHANGE UP
CULTURE RESULTS: SIGNIFICANT CHANGE UP
SPECIMEN SOURCE: SIGNIFICANT CHANGE UP
SPECIMEN SOURCE: SIGNIFICANT CHANGE UP

## 2022-09-26 DIAGNOSIS — A41.9 SEPSIS, UNSPECIFIED ORGANISM: ICD-10-CM

## 2022-09-26 DIAGNOSIS — N18.9 CHRONIC KIDNEY DISEASE, UNSPECIFIED: ICD-10-CM

## 2022-09-26 DIAGNOSIS — Z88.0 ALLERGY STATUS TO PENICILLIN: ICD-10-CM

## 2022-09-26 DIAGNOSIS — N10 ACUTE PYELONEPHRITIS: ICD-10-CM

## 2022-09-26 DIAGNOSIS — Z87.891 PERSONAL HISTORY OF NICOTINE DEPENDENCE: ICD-10-CM

## 2022-09-26 DIAGNOSIS — K59.00 CONSTIPATION, UNSPECIFIED: ICD-10-CM

## 2022-09-26 DIAGNOSIS — N17.9 ACUTE KIDNEY FAILURE, UNSPECIFIED: ICD-10-CM

## 2022-09-26 DIAGNOSIS — N34.2 OTHER URETHRITIS: ICD-10-CM

## 2022-09-26 DIAGNOSIS — E78.5 HYPERLIPIDEMIA, UNSPECIFIED: ICD-10-CM

## 2022-09-26 DIAGNOSIS — I12.9 HYPERTENSIVE CHRONIC KIDNEY DISEASE WITH STAGE 1 THROUGH STAGE 4 CHRONIC KIDNEY DISEASE, OR UNSPECIFIED CHRONIC KIDNEY DISEASE: ICD-10-CM

## 2023-06-26 NOTE — ED STATDOCS - CROS ED ROS STATEMENT
all other ROS negative except as per HPI
abd cramping with bright red blood per rectum for 24 hours. No blood thinners.

## 2024-03-28 RX ORDER — AZITHROMYCIN 500 MG/1
0 TABLET, FILM COATED ORAL
Refills: 0 | DISCHARGE
Start: 2024-03-28 | End: 2024-04-02

## 2024-04-01 ENCOUNTER — INPATIENT (INPATIENT)
Facility: HOSPITAL | Age: 39
LOS: 2 days | Discharge: HOME HEALTH | DRG: 871 | End: 2024-04-04
Attending: STUDENT IN AN ORGANIZED HEALTH CARE EDUCATION/TRAINING PROGRAM | Admitting: INTERNAL MEDICINE
Payer: COMMERCIAL

## 2024-04-01 VITALS
OXYGEN SATURATION: 98 % | SYSTOLIC BLOOD PRESSURE: 125 MMHG | DIASTOLIC BLOOD PRESSURE: 103 MMHG | TEMPERATURE: 98 F | HEART RATE: 75 BPM | RESPIRATION RATE: 32 BRPM

## 2024-04-01 DIAGNOSIS — I48.91 UNSPECIFIED ATRIAL FIBRILLATION: ICD-10-CM

## 2024-04-01 PROBLEM — I10 ESSENTIAL (PRIMARY) HYPERTENSION: Chronic | Status: ACTIVE | Noted: 2022-09-19

## 2024-04-01 PROBLEM — E78.5 HYPERLIPIDEMIA, UNSPECIFIED: Chronic | Status: ACTIVE | Noted: 2022-09-19

## 2024-04-01 LAB
ADD ON TEST-SPECIMEN IN LAB: SIGNIFICANT CHANGE UP
ALBUMIN SERPL ELPH-MCNC: 3.2 G/DL — LOW (ref 3.3–5)
ALP SERPL-CCNC: 77 U/L — SIGNIFICANT CHANGE UP (ref 40–120)
ALT FLD-CCNC: 339 U/L — HIGH (ref 12–78)
AMPHET UR-MCNC: NEGATIVE — SIGNIFICANT CHANGE UP
ANION GAP SERPL CALC-SCNC: 7 MMOL/L — SIGNIFICANT CHANGE UP (ref 5–17)
ANISOCYTOSIS BLD QL: SIGNIFICANT CHANGE UP
APAP SERPL-MCNC: <2 UG/ML — LOW (ref 10–30)
APPEARANCE UR: CLEAR — SIGNIFICANT CHANGE UP
APTT BLD: 34.5 SEC — SIGNIFICANT CHANGE UP (ref 24.5–35.6)
AST SERPL-CCNC: 113 U/L — HIGH (ref 15–37)
BACTERIA # UR AUTO: NEGATIVE /HPF — SIGNIFICANT CHANGE UP
BARBITURATES UR SCN-MCNC: NEGATIVE — SIGNIFICANT CHANGE UP
BASOPHILS # BLD AUTO: 0.04 K/UL — SIGNIFICANT CHANGE UP (ref 0–0.2)
BASOPHILS NFR BLD AUTO: 0.3 % — SIGNIFICANT CHANGE UP (ref 0–2)
BENZODIAZ UR-MCNC: NEGATIVE — SIGNIFICANT CHANGE UP
BILIRUB SERPL-MCNC: 0.8 MG/DL — SIGNIFICANT CHANGE UP (ref 0.2–1.2)
BILIRUB UR-MCNC: NEGATIVE — SIGNIFICANT CHANGE UP
BUN SERPL-MCNC: 20 MG/DL — SIGNIFICANT CHANGE UP (ref 7–23)
CALCIUM SERPL-MCNC: 8.4 MG/DL — LOW (ref 8.5–10.1)
CAST: 0 /LPF — SIGNIFICANT CHANGE UP (ref 0–4)
CHLORIDE SERPL-SCNC: 107 MMOL/L — SIGNIFICANT CHANGE UP (ref 96–108)
CO2 SERPL-SCNC: 22 MMOL/L — SIGNIFICANT CHANGE UP (ref 22–31)
COCAINE METAB.OTHER UR-MCNC: NEGATIVE — SIGNIFICANT CHANGE UP
COLOR SPEC: YELLOW — SIGNIFICANT CHANGE UP
CREAT SERPL-MCNC: 1.33 MG/DL — HIGH (ref 0.5–1.3)
DIFF PNL FLD: NEGATIVE — SIGNIFICANT CHANGE UP
EGFR: 71 ML/MIN/1.73M2 — SIGNIFICANT CHANGE UP
EOSINOPHIL # BLD AUTO: 0.06 K/UL — SIGNIFICANT CHANGE UP (ref 0–0.5)
EOSINOPHIL NFR BLD AUTO: 0.5 % — SIGNIFICANT CHANGE UP (ref 0–6)
ETHANOL SERPL-MCNC: <10 MG/DL — SIGNIFICANT CHANGE UP (ref 0–10)
FLUAV AG NPH QL: SIGNIFICANT CHANGE UP
FLUBV AG NPH QL: SIGNIFICANT CHANGE UP
GLUCOSE BLDC GLUCOMTR-MCNC: 133 MG/DL — HIGH (ref 70–99)
GLUCOSE SERPL-MCNC: 167 MG/DL — HIGH (ref 70–99)
GLUCOSE UR QL: NEGATIVE MG/DL — SIGNIFICANT CHANGE UP
HCT VFR BLD CALC: 44.4 % — SIGNIFICANT CHANGE UP (ref 39–50)
HGB BLD-MCNC: 15 G/DL — SIGNIFICANT CHANGE UP (ref 13–17)
IMM GRANULOCYTES NFR BLD AUTO: 0.8 % — SIGNIFICANT CHANGE UP (ref 0–0.9)
INR BLD: 1.23 RATIO — HIGH (ref 0.85–1.18)
KETONES UR-MCNC: NEGATIVE MG/DL — SIGNIFICANT CHANGE UP
LACTATE SERPL-SCNC: 1.9 MMOL/L — SIGNIFICANT CHANGE UP (ref 0.7–2)
LEUKOCYTE ESTERASE UR-ACNC: NEGATIVE — SIGNIFICANT CHANGE UP
LG PLATELETS BLD QL AUTO: SLIGHT — SIGNIFICANT CHANGE UP
LIDOCAIN IGE QN: 17 U/L — SIGNIFICANT CHANGE UP (ref 13–75)
LYMPHOCYTES # BLD AUTO: 2.72 K/UL — SIGNIFICANT CHANGE UP (ref 1–3.3)
LYMPHOCYTES # BLD AUTO: 20.8 % — SIGNIFICANT CHANGE UP (ref 13–44)
MACROCYTES BLD QL: SLIGHT — SIGNIFICANT CHANGE UP
MAGNESIUM SERPL-MCNC: 2 MG/DL — SIGNIFICANT CHANGE UP (ref 1.6–2.6)
MANUAL SMEAR VERIFICATION: SIGNIFICANT CHANGE UP
MCHC RBC-ENTMCNC: 30.6 PG — SIGNIFICANT CHANGE UP (ref 27–34)
MCHC RBC-ENTMCNC: 33.8 GM/DL — SIGNIFICANT CHANGE UP (ref 32–36)
MCV RBC AUTO: 90.6 FL — SIGNIFICANT CHANGE UP (ref 80–100)
METHADONE UR-MCNC: NEGATIVE — SIGNIFICANT CHANGE UP
MICROCYTES BLD QL: SLIGHT — SIGNIFICANT CHANGE UP
MONOCYTES # BLD AUTO: 1.22 K/UL — HIGH (ref 0–0.9)
MONOCYTES NFR BLD AUTO: 9.3 % — SIGNIFICANT CHANGE UP (ref 2–14)
NEUTROPHILS # BLD AUTO: 8.9 K/UL — HIGH (ref 1.8–7.4)
NEUTROPHILS NFR BLD AUTO: 68.3 % — SIGNIFICANT CHANGE UP (ref 43–77)
NITRITE UR-MCNC: NEGATIVE — SIGNIFICANT CHANGE UP
NT-PROBNP SERPL-SCNC: 2441 PG/ML — HIGH (ref 0–125)
OPIATES UR-MCNC: NEGATIVE — SIGNIFICANT CHANGE UP
PCP SPEC-MCNC: SIGNIFICANT CHANGE UP
PCP UR-MCNC: NEGATIVE — SIGNIFICANT CHANGE UP
PH UR: 6.5 — SIGNIFICANT CHANGE UP (ref 5–8)
PLAT MORPH BLD: ABNORMAL
PLATELET # BLD AUTO: 268 K/UL — SIGNIFICANT CHANGE UP (ref 150–400)
POIKILOCYTOSIS BLD QL AUTO: SLIGHT — SIGNIFICANT CHANGE UP
POLYCHROMASIA BLD QL SMEAR: SLIGHT — SIGNIFICANT CHANGE UP
POTASSIUM SERPL-MCNC: 4.1 MMOL/L — SIGNIFICANT CHANGE UP (ref 3.5–5.3)
POTASSIUM SERPL-SCNC: 4.1 MMOL/L — SIGNIFICANT CHANGE UP (ref 3.5–5.3)
PROT SERPL-MCNC: 6.7 GM/DL — SIGNIFICANT CHANGE UP (ref 6–8.3)
PROT UR-MCNC: 100 MG/DL
PROTHROM AB SERPL-ACNC: 13.8 SEC — HIGH (ref 9.5–13)
RBC # BLD: 4.9 M/UL — SIGNIFICANT CHANGE UP (ref 4.2–5.8)
RBC # FLD: 15.1 % — HIGH (ref 10.3–14.5)
RBC BLD AUTO: ABNORMAL
RBC CASTS # UR COMP ASSIST: 1 /HPF — SIGNIFICANT CHANGE UP (ref 0–4)
RSV RNA NPH QL NAA+NON-PROBE: SIGNIFICANT CHANGE UP
SALICYLATES SERPL-MCNC: <1.7 MG/DL — LOW (ref 2.8–20)
SARS-COV-2 RNA SPEC QL NAA+PROBE: SIGNIFICANT CHANGE UP
SODIUM SERPL-SCNC: 136 MMOL/L — SIGNIFICANT CHANGE UP (ref 135–145)
SP GR SPEC: 1.02 — SIGNIFICANT CHANGE UP (ref 1–1.03)
SQUAMOUS # UR AUTO: 1 /HPF — SIGNIFICANT CHANGE UP (ref 0–5)
THC UR QL: NEGATIVE — SIGNIFICANT CHANGE UP
TROPONIN I, HIGH SENSITIVITY RESULT: 13.28 NG/L — SIGNIFICANT CHANGE UP
TROPONIN I, HIGH SENSITIVITY RESULT: 16.76 NG/L — SIGNIFICANT CHANGE UP
TSH SERPL-MCNC: 2.28 UU/ML — SIGNIFICANT CHANGE UP (ref 0.34–4.82)
UROBILINOGEN FLD QL: 0.2 MG/DL — SIGNIFICANT CHANGE UP (ref 0.2–1)
WBC # BLD: 13.05 K/UL — HIGH (ref 3.8–10.5)
WBC # FLD AUTO: 13.05 K/UL — HIGH (ref 3.8–10.5)
WBC UR QL: 1 /HPF — SIGNIFICANT CHANGE UP (ref 0–5)

## 2024-04-01 PROCEDURE — 80048 BASIC METABOLIC PNL TOTAL CA: CPT

## 2024-04-01 PROCEDURE — 85730 THROMBOPLASTIN TIME PARTIAL: CPT

## 2024-04-01 PROCEDURE — 80074 ACUTE HEPATITIS PANEL: CPT

## 2024-04-01 PROCEDURE — 86255 FLUORESCENT ANTIBODY SCREEN: CPT

## 2024-04-01 PROCEDURE — 93005 ELECTROCARDIOGRAM TRACING: CPT

## 2024-04-01 PROCEDURE — 83036 HEMOGLOBIN GLYCOSYLATED A1C: CPT

## 2024-04-01 PROCEDURE — 85027 COMPLETE CBC AUTOMATED: CPT

## 2024-04-01 PROCEDURE — 83735 ASSAY OF MAGNESIUM: CPT

## 2024-04-01 PROCEDURE — 86140 C-REACTIVE PROTEIN: CPT

## 2024-04-01 PROCEDURE — 84145 PROCALCITONIN (PCT): CPT

## 2024-04-01 PROCEDURE — 93325 DOPPLER ECHO COLOR FLOW MAPG: CPT

## 2024-04-01 PROCEDURE — 85025 COMPLETE CBC W/AUTO DIFF WBC: CPT

## 2024-04-01 PROCEDURE — 94640 AIRWAY INHALATION TREATMENT: CPT

## 2024-04-01 PROCEDURE — 82390 ASSAY OF CERULOPLASMIN: CPT

## 2024-04-01 PROCEDURE — 82962 GLUCOSE BLOOD TEST: CPT

## 2024-04-01 PROCEDURE — 85379 FIBRIN DEGRADATION QUANT: CPT

## 2024-04-01 PROCEDURE — 80061 LIPID PANEL: CPT

## 2024-04-01 PROCEDURE — 76705 ECHO EXAM OF ABDOMEN: CPT

## 2024-04-01 PROCEDURE — 84484 ASSAY OF TROPONIN QUANT: CPT

## 2024-04-01 PROCEDURE — 82248 BILIRUBIN DIRECT: CPT

## 2024-04-01 PROCEDURE — 93320 DOPPLER ECHO COMPLETE: CPT

## 2024-04-01 PROCEDURE — 93312 ECHO TRANSESOPHAGEAL: CPT

## 2024-04-01 PROCEDURE — 84436 ASSAY OF TOTAL THYROXINE: CPT

## 2024-04-01 PROCEDURE — 85610 PROTHROMBIN TIME: CPT

## 2024-04-01 PROCEDURE — 80053 COMPREHEN METABOLIC PANEL: CPT

## 2024-04-01 PROCEDURE — 83880 ASSAY OF NATRIURETIC PEPTIDE: CPT

## 2024-04-01 PROCEDURE — 93306 TTE W/DOPPLER COMPLETE: CPT

## 2024-04-01 PROCEDURE — 0225U NFCT DS DNA&RNA 21 SARSCOV2: CPT

## 2024-04-01 PROCEDURE — 99223 1ST HOSP IP/OBS HIGH 75: CPT

## 2024-04-01 PROCEDURE — 86790 VIRUS ANTIBODY NOS: CPT

## 2024-04-01 PROCEDURE — 92960 CARDIOVERSION ELECTRIC EXT: CPT

## 2024-04-01 PROCEDURE — 82306 VITAMIN D 25 HYDROXY: CPT

## 2024-04-01 PROCEDURE — 36415 COLL VENOUS BLD VENIPUNCTURE: CPT

## 2024-04-01 PROCEDURE — 84100 ASSAY OF PHOSPHORUS: CPT

## 2024-04-01 PROCEDURE — 86038 ANTINUCLEAR ANTIBODIES: CPT

## 2024-04-01 RX ORDER — CEFEPIME 1 G/1
1000 INJECTION, POWDER, FOR SOLUTION INTRAMUSCULAR; INTRAVENOUS EVERY 12 HOURS
Refills: 0 | Status: DISCONTINUED | OUTPATIENT
Start: 2024-04-01 | End: 2024-04-01

## 2024-04-01 RX ORDER — ACETAMINOPHEN 500 MG
650 TABLET ORAL ONCE
Refills: 0 | Status: COMPLETED | OUTPATIENT
Start: 2024-04-01 | End: 2024-04-01

## 2024-04-01 RX ORDER — METOPROLOL TARTRATE 50 MG
25 TABLET ORAL
Refills: 0 | Status: DISCONTINUED | OUTPATIENT
Start: 2024-04-01 | End: 2024-04-04

## 2024-04-01 RX ORDER — SODIUM CHLORIDE 9 MG/ML
1000 INJECTION INTRAMUSCULAR; INTRAVENOUS; SUBCUTANEOUS ONCE
Refills: 0 | Status: COMPLETED | OUTPATIENT
Start: 2024-04-01 | End: 2024-04-01

## 2024-04-01 RX ORDER — ALPRAZOLAM 0.25 MG
0.5 TABLET ORAL ONCE
Refills: 0 | Status: DISCONTINUED | OUTPATIENT
Start: 2024-04-01 | End: 2024-04-01

## 2024-04-01 RX ORDER — VANCOMYCIN HCL 1 G
2000 VIAL (EA) INTRAVENOUS ONCE
Refills: 0 | Status: COMPLETED | OUTPATIENT
Start: 2024-04-01 | End: 2024-04-01

## 2024-04-01 RX ORDER — CEFEPIME 1 G/1
1000 INJECTION, POWDER, FOR SOLUTION INTRAMUSCULAR; INTRAVENOUS EVERY 12 HOURS
Refills: 0 | Status: DISCONTINUED | OUTPATIENT
Start: 2024-04-02 | End: 2024-04-02

## 2024-04-01 RX ORDER — CYCLOBENZAPRINE HYDROCHLORIDE 10 MG/1
1 TABLET, FILM COATED ORAL
Qty: 0 | Refills: 0 | DISCHARGE

## 2024-04-01 RX ORDER — TIOTROPIUM BROMIDE 18 UG/1
2 CAPSULE ORAL; RESPIRATORY (INHALATION) DAILY
Refills: 0 | Status: DISCONTINUED | OUTPATIENT
Start: 2024-04-01 | End: 2024-04-04

## 2024-04-01 RX ORDER — DEXTROSE 50 % IN WATER 50 %
12.5 SYRINGE (ML) INTRAVENOUS ONCE
Refills: 0 | Status: DISCONTINUED | OUTPATIENT
Start: 2024-04-01 | End: 2024-04-04

## 2024-04-01 RX ORDER — DEXTROSE 50 % IN WATER 50 %
25 SYRINGE (ML) INTRAVENOUS ONCE
Refills: 0 | Status: DISCONTINUED | OUTPATIENT
Start: 2024-04-01 | End: 2024-04-04

## 2024-04-01 RX ORDER — INSULIN LISPRO 100/ML
VIAL (ML) SUBCUTANEOUS
Refills: 0 | Status: DISCONTINUED | OUTPATIENT
Start: 2024-04-01 | End: 2024-04-04

## 2024-04-01 RX ORDER — DEXTROSE 50 % IN WATER 50 %
15 SYRINGE (ML) INTRAVENOUS ONCE
Refills: 0 | Status: DISCONTINUED | OUTPATIENT
Start: 2024-04-01 | End: 2024-04-04

## 2024-04-01 RX ORDER — HEPARIN SODIUM 5000 [USP'U]/ML
5000 INJECTION INTRAVENOUS; SUBCUTANEOUS EVERY 6 HOURS
Refills: 0 | Status: DISCONTINUED | OUTPATIENT
Start: 2024-04-01 | End: 2024-04-02

## 2024-04-01 RX ORDER — SODIUM CHLORIDE 9 MG/ML
1000 INJECTION, SOLUTION INTRAVENOUS
Refills: 0 | Status: DISCONTINUED | OUTPATIENT
Start: 2024-04-01 | End: 2024-04-04

## 2024-04-01 RX ORDER — HEPARIN SODIUM 5000 [USP'U]/ML
10000 INJECTION INTRAVENOUS; SUBCUTANEOUS ONCE
Refills: 0 | Status: COMPLETED | OUTPATIENT
Start: 2024-04-01 | End: 2024-04-01

## 2024-04-01 RX ORDER — ALBUTEROL 90 UG/1
2 AEROSOL, METERED ORAL
Qty: 0 | Refills: 0 | DISCHARGE

## 2024-04-01 RX ORDER — CHOLECALCIFEROL (VITAMIN D3) 125 MCG
1 CAPSULE ORAL
Qty: 0 | Refills: 0 | DISCHARGE

## 2024-04-01 RX ORDER — GLUCAGON INJECTION, SOLUTION 0.5 MG/.1ML
1 INJECTION, SOLUTION SUBCUTANEOUS ONCE
Refills: 0 | Status: DISCONTINUED | OUTPATIENT
Start: 2024-04-01 | End: 2024-04-04

## 2024-04-01 RX ORDER — DILTIAZEM HCL 120 MG
5 CAPSULE, EXT RELEASE 24 HR ORAL
Qty: 125 | Refills: 0 | Status: DISCONTINUED | OUTPATIENT
Start: 2024-04-01 | End: 2024-04-02

## 2024-04-01 RX ORDER — VANCOMYCIN HCL 1 G
2000 VIAL (EA) INTRAVENOUS EVERY 12 HOURS
Refills: 0 | Status: DISCONTINUED | OUTPATIENT
Start: 2024-04-02 | End: 2024-04-02

## 2024-04-01 RX ORDER — LIDOCAINE 4 G/100G
1 CREAM TOPICAL
Qty: 0 | Refills: 0 | DISCHARGE

## 2024-04-01 RX ORDER — ASCORBIC ACID 60 MG
1 TABLET,CHEWABLE ORAL
Qty: 0 | Refills: 0 | DISCHARGE

## 2024-04-01 RX ORDER — ALBUTEROL 90 UG/1
2 AEROSOL, METERED ORAL EVERY 6 HOURS
Refills: 0 | Status: DISCONTINUED | OUTPATIENT
Start: 2024-04-01 | End: 2024-04-04

## 2024-04-01 RX ORDER — CETIRIZINE HYDROCHLORIDE 10 MG/1
1 TABLET ORAL
Qty: 0 | Refills: 0 | DISCHARGE

## 2024-04-01 RX ORDER — CEFEPIME 1 G/1
1000 INJECTION, POWDER, FOR SOLUTION INTRAMUSCULAR; INTRAVENOUS ONCE
Refills: 0 | Status: COMPLETED | OUTPATIENT
Start: 2024-04-01 | End: 2024-04-01

## 2024-04-01 RX ORDER — FEXOFENADINE HCL 30 MG
1 TABLET ORAL
Refills: 0 | DISCHARGE

## 2024-04-01 RX ORDER — ACETAMINOPHEN 500 MG
650 TABLET ORAL EVERY 6 HOURS
Refills: 0 | Status: DISCONTINUED | OUTPATIENT
Start: 2024-04-01 | End: 2024-04-01

## 2024-04-01 RX ORDER — HEPARIN SODIUM 5000 [USP'U]/ML
INJECTION INTRAVENOUS; SUBCUTANEOUS
Qty: 25000 | Refills: 0 | Status: DISCONTINUED | OUTPATIENT
Start: 2024-04-01 | End: 2024-04-02

## 2024-04-01 RX ORDER — HEPARIN SODIUM 5000 [USP'U]/ML
10000 INJECTION INTRAVENOUS; SUBCUTANEOUS EVERY 6 HOURS
Refills: 0 | Status: DISCONTINUED | OUTPATIENT
Start: 2024-04-01 | End: 2024-04-02

## 2024-04-01 RX ORDER — INSULIN LISPRO 100/ML
VIAL (ML) SUBCUTANEOUS AT BEDTIME
Refills: 0 | Status: DISCONTINUED | OUTPATIENT
Start: 2024-04-01 | End: 2024-04-04

## 2024-04-01 RX ORDER — OMEGA-3 ACID ETHYL ESTERS 1 G
1 CAPSULE ORAL
Qty: 0 | Refills: 0 | DISCHARGE

## 2024-04-01 RX ORDER — CEFEPIME 1 G/1
1000 INJECTION, POWDER, FOR SOLUTION INTRAMUSCULAR; INTRAVENOUS ONCE
Refills: 0 | Status: DISCONTINUED | OUTPATIENT
Start: 2024-04-01 | End: 2024-04-01

## 2024-04-01 RX ORDER — ROSUVASTATIN CALCIUM 5 MG/1
1 TABLET ORAL
Qty: 0 | Refills: 0 | DISCHARGE

## 2024-04-01 RX ADMIN — HEPARIN SODIUM 2400 UNIT(S)/HR: 5000 INJECTION INTRAVENOUS; SUBCUTANEOUS at 23:12

## 2024-04-01 RX ADMIN — Medication 650 MILLIGRAM(S): at 14:47

## 2024-04-01 RX ADMIN — Medication 25 MILLIGRAM(S): at 17:56

## 2024-04-01 RX ADMIN — Medication 5 MG/HR: at 15:35

## 2024-04-01 RX ADMIN — CEFEPIME 1000 MILLIGRAM(S): 1 INJECTION, POWDER, FOR SOLUTION INTRAMUSCULAR; INTRAVENOUS at 17:49

## 2024-04-01 RX ADMIN — Medication 0.5 MILLIGRAM(S): at 23:45

## 2024-04-01 RX ADMIN — Medication 200 MILLIGRAM(S): at 23:45

## 2024-04-01 RX ADMIN — HEPARIN SODIUM 10000 UNIT(S): 5000 INJECTION INTRAVENOUS; SUBCUTANEOUS at 23:12

## 2024-04-01 RX ADMIN — SODIUM CHLORIDE 1000 MILLILITER(S): 9 INJECTION INTRAMUSCULAR; INTRAVENOUS; SUBCUTANEOUS at 14:48

## 2024-04-01 RX ADMIN — Medication 1 MILLIGRAM(S): at 17:28

## 2024-04-01 RX ADMIN — Medication 250 MILLIGRAM(S): at 18:51

## 2024-04-01 NOTE — CONSULT NOTE ADULT - ASSESSMENT
38 year old morbidly obese pt that presents with cough and complaints of fever who was found to be in AT Fib with RVR. He has acute Heart Failure   His Serum Cr is 1.33, BNP 2441, WBC 13.  CT of chest reveals- Small area of consolidation in the inferior lingula may represent   pneumonia versus atelectasis.  Liver Enzymes  and . AT home he was on Prednisone Zithromax and Doxycyline   Past Med hx is positive for HTN and HLD.  In 2022 he was admitted with Pyelonephritis/ Ureteritis Urosepsis:      Continue with Cardizem drip and Metoprolol was added  Echo was performed this evening-await results  Toxicology screen and alcohol testing were also performed by not results  Dr. Suresh has ordered a possible DC and CV for the am, with SERA  Keep Py NPO after midnight  Begin Anticoagulation therapy   Avoid NSAIDs  Continue to monitor  Keep potassium level > 4 and Mg > 2. 38 year old morbidly obese pt that presents with cough and complaints of fever who was found to be in AT Fib with RVR. He has acute Heart Failure   His Serum Cr is 1.33, BNP 2441, WBC 13.  CT of chest reveals- Small area of consolidation in the inferior lingula may represent   pneumonia versus atelectasis.  Liver Enzymes  and . AT home he was on Prednisone Zithromax and Doxycyline   Past Med hx is positive for HTN and HLD.  In 2022 he was admitted with Pyelonephritis/ Ureteritis Urosepsis:      Continue with Cardizem drip and Metoprolol was added  Echo was performed this evening-await results  Toxicology screen and alcohol testing were also performed by not results  Dr. Suresh has ordered a possible DC and CV for the am, with SERA  Keep Py NPO after midnight  Begin Anticoagulation therapy   Avoid NSAIDs  Continue to monitor  Keep potassium level > 4 and Mg > 2  Please avoid Digoxin with Serum Cr of 1.33 38 year old morbidly obese pt that presents with cough and complaints of fever who was found to be in AT Fib with RVR in the setting of an URI. He has acute Heart Failure   His Serum Cr is 1.33, BNP 2441, WBC 13.  CT of chest reveals- Small area of consolidation in the inferior lingula may represent   pneumonia versus atelectasis.  Liver Enzymes  and . AT home he was on Prednisone Zithromax and Doxycyline   Past Med hx is positive for HTN and HLD.  In 2022 he was admitted with Pyelonephritis/ Ureteritis Urosepsis:      Continue with Cardizem drip and Metoprolol was added  Echo was performed this evening-await results  Toxicology screen and alcohol testing were also performed by not results  Dr. Suresh has ordered a possible DC and CV for the am, with SERA  Keep Py NPO after midnight  Begin Anticoagulation therapy   Avoid NSAIDs  Continue to monitor  Keep potassium level > 4 and Mg > 2  Please avoid Digoxin with Serum Cr of 1.33

## 2024-04-01 NOTE — ED ADULT TRIAGE NOTE - CHIEF COMPLAINT QUOTE
pt c/o shortness of breath, swollen stomach, chest pain, fever x 4 days. denies n/v/d. pmh: htn. ekg in triage shows new onset of afib. pt sent directly to main.

## 2024-04-01 NOTE — ED PROVIDER NOTE - PHYSICAL EXAMINATION
Constitutional: NAD AAOx3. Morbid obesity. No respiratory distress.  Eyes: PERRLA EOMI  Head: Normocephalic atraumatic  Mouth: MMM  Cardiac: Tachycardic, irregular.  Resp: Lungs CTAB  GI: +Diffuse abdominal TTP.   Neuro: CN2-12 intact  Skin: No visible rashes

## 2024-04-01 NOTE — CONSULT NOTE ADULT - SUBJECTIVE AND OBJECTIVE BOX
38y Male admitted for SOB and irregular heart beat.  Pt said it started last Thursday. He was started on Diltiazem Drip and is at 20 mgs /hour and his ventricular heart rate continues to reach 130-140s.  Dr. Tillman added on Metoprolol  He was not on any AV libby blockers at home    PAST MEDICAL & SURGICAL HISTORY:  HTN (hypertension)  HLD (hyperlipidemia)    SOCIAL HISTORY: Denies tobacco, etoh abuse or illicit drug use    FAMILY HISTORY:  FH: CAD (coronary artery disease)    FH: type 2 diabetes    FH: HTN (hypertension)          MEDICATIONS  (STANDING):  diltiazem Infusion 5 mG/Hr (5 mL/Hr) IV Continuous <Continuous>  metoprolol tartrate 25 milliGRAM(s) Oral two times a day  vancomycin  IVPB. 2000 milliGRAM(s) IV Intermittent once    MEDICATIONS  (PRN):  acetaminophen     Tablet .. 650 milliGRAM(s) Oral every 6 hours PRN Mild Pain (1 - 3)      Allergies    penicillins (Unknown)    Intolerances                Vital Signs Last 24 Hrs  T(C): 37.4 (2024 14:33), Max: 37.4 (2024 14:33)  T(F): 99.4 (2024 14:33), Max: 99.4 (2024 14:33)  HR: 151 (2024 15:30) (75 - 155)  BP: 119/89 (2024 15:30) (114/83 - 131/108)  BP(mean): 99 (2024 15:30) (99 - 112)  RR: 18 (2024 15:30) (18 - 32)  SpO2: 98% (2024 15:30) (98% - 98%)    Parameters below as of 2024 15:30  Patient On (Oxygen Delivery Method): room air        REVIEW OF SYSTEMS:    CONSTITUTIONAL:  As per HPI.  HEENT:  Eyes:  No diplopia or blurred vision. ENT:  No earache, sore throat or runny nose.  CARDIOVASCULAR:  No pressure, squeezing, strangling, tightness, heaviness or aching about the chest, neck, axilla or epigastrium.  RESPIRATORY:  Non productive  cough,  and shortness of breath, .  GASTROINTESTINAL:  No nausea, vomiting or diarrhea.  GENITOURINARY:  No dysuria, frequency or urgency.  MUSCULOSKELETAL:  As per HPI.  SKIN:  No change in skin, hair or nails.  NEUROLOGIC:  No paresthesias, fasciculations, seizures or weakness.  PSYCHIATRIC:  No disorder of thought or mood.  ENDOCRINE:  No heat or cold intolerance, polyuria or polydipsia.  HEMATOLOGICAL:  No easy bruising or bleedings:  .     PHYSICAL EXAMINATION:    GENERAL APPEARANCE:  Pt. is not currently dyspneic, in no distress. Pt. is alert, oriented, and pleasant.  HEENT:  Pupils are normal and react normally. No icterus. Mucous membranes well colored.  NECK:  Supple. No lymphadenopathy. Jugular venous pressure not elevated. Carotids equal.   HEART:   The cardiac impulse has a normal quality. There are no murmurs, rubs or gallops noted  CHEST:  Chest is clear to auscultation. Normal respiratory effort.  ABDOMEN:  Soft and nontender.   EXTREMITIES:  There is no edema.   SKIN:  No rash or significant lesions are noted.    I&O's Summary      LABS:                        15.0   13.05 )-----------( 268      ( 2024 14:28 )             44.4     04-    136  |  107  |  20  ----------------------------<  167<H>  4.1   |  22  |  1.33<H>    Ca    8.4<L>      2024 14:28  Mg     2.0     04-    TPro  6.7  /  Alb  3.2<L>  /  TBili  0.8  /  DBili  x   /  AST  113<H>  /  ALT  339<H>  /  AlkPhos  77  04-    LIVER FUNCTIONS - ( 2024 14:28 )  Alb: 3.2 g/dL / Pro: 6.7 gm/dL / ALK PHOS: 77 U/L / ALT: 339 U/L / AST: 113 U/L / GGT: x           PT/INR - ( 2024 14:28 )   PT: 13.8 sec;   INR: 1.23 ratio         PTT - ( 2024 14:28 )  PTT:34.5 sec      Urinalysis Basic - ( 2024 15:42 )    Color: Yellow / Appearance: Clear / S.023 / pH: x  Gluc: x / Ketone: Negative mg/dL  / Bili: Negative / Urobili: 0.2 mg/dL   Blood: x / Protein: 100 mg/dL / Nitrite: Negative   Leuk Esterase: Negative / RBC: 1 /HPF / WBC 1 /HPF   Sq Epi: x / Non Sq Epi: 1 /HPF / Bacteria: Negative /HPF          EKG:    TELEMETRY:  AT Fib with -130 BPM      < from: CT Angio Chest PE Protocol w/ IV Cont (24 @ 15:35) >  IMPRESSION:  Small area of consolidation in the inferior lingula may represent   pneumonia versus atelectasis.  No evidence of pulmonary embolism  No acute pathology in the abdomen or pelvis    < end of copied text >      CARDIAC TESTS:    RADIOLOGY & ADDITIONAL STUDIES:     38y Male admitted for SOB and irregular heart beat.  Pt said it started last Thursday. He was started on Diltiazem Drip and is at 20 mgs /hour and his ventricular heart rate continues to reach 130-140s.  Dr. Tillman added on Metoprolol  He was not on any AV libby blockers at home    PAST MEDICAL & SURGICAL HISTORY:  HTN (hypertension)  HLD (hyperlipidemia)    SOCIAL HISTORY: Denies tobacco, etoh abuse or illicit drug use    FAMILY HISTORY:  FH: CAD (coronary artery disease)    FH: type 2 diabetes    FH: HTN (hypertension)          MEDICATIONS  (STANDING):  diltiazem Infusion 5 mG/Hr (5 mL/Hr) IV Continuous <Continuous>  metoprolol tartrate 25 milliGRAM(s) Oral two times a day  vancomycin  IVPB. 2000 milliGRAM(s) IV Intermittent once    MEDICATIONS  (PRN):  acetaminophen     Tablet .. 650 milliGRAM(s) Oral every 6 hours PRN Mild Pain (1 - 3)      Allergies    penicillins (Unknown)    Intolerances                Vital Signs Last 24 Hrs  T(C): 37.4 (2024 14:33), Max: 37.4 (2024 14:33)  T(F): 99.4 (2024 14:33), Max: 99.4 (2024 14:33)  HR: 151 (2024 15:30) (75 - 155)  BP: 119/89 (2024 15:30) (114/83 - 131/108)  BP(mean): 99 (2024 15:30) (99 - 112)  RR: 18 (2024 15:30) (18 - 32)  SpO2: 98% (2024 15:30) (98% - 98%)    Parameters below as of 2024 15:30  Patient On (Oxygen Delivery Method): room air        REVIEW OF SYSTEMS:    CONSTITUTIONAL:  As per HPI.  HEENT:  Eyes:  No diplopia or blurred vision. ENT:  No earache, sore throat or runny nose.  CARDIOVASCULAR:  No pressure, squeezing, strangling, tightness, heaviness or aching about the chest, neck, axilla or epigastrium.  RESPIRATORY:  Non productive  cough,  and shortness of breath, .  GASTROINTESTINAL:  No nausea, vomiting or diarrhea.  GENITOURINARY:  No dysuria, frequency or urgency.  MUSCULOSKELETAL:  As per HPI.  SKIN:  No change in skin, hair or nails.  NEUROLOGIC:  No paresthesias, fasciculations, seizures or weakness.  PSYCHIATRIC:  No disorder of thought or mood.  ENDOCRINE:  No heat or cold intolerance, polyuria or polydipsia.  HEMATOLOGICAL:  No easy bruising or bleedings:  .     PHYSICAL EXAMINATION:    GENERAL APPEARANCE:  Pt. is not currently dyspneic, in no distress. Pt. is alert, oriented, and pleasant.  HEENT:  Pupils are normal and react normally. No icterus. Mucous membranes well colored.  NECK:  Supple. No lymphadenopathy. Jugular venous pressure not elevated. Carotids equal.   HEART:   The cardiac impulse has a normal quality. There are no murmurs, rubs or gallops noted  CHEST:  Chest is clear to auscultation. Normal respiratory effort.  ABDOMEN:  Soft and nontender.   EXTREMITIES:  There is no edema.   SKIN:  No rash or significant lesions are noted.    I&O's Summary      LABS:                        15.0   13.05 )-----------( 268      ( 2024 14:28 )             44.4     04-    136  |  107  |  20  ----------------------------<  167<H>  4.1   |  22  |  1.33<H>    Ca    8.4<L>      2024 14:28  Mg     2.0     04-    TPro  6.7  /  Alb  3.2<L>  /  TBili  0.8  /  DBili  x   /  AST  113<H>  /  ALT  339<H>  /  AlkPhos  77  04-    LIVER FUNCTIONS - ( 2024 14:28 )  Alb: 3.2 g/dL / Pro: 6.7 gm/dL / ALK PHOS: 77 U/L / ALT: 339 U/L / AST: 113 U/L / GGT: x           PT/INR - ( 2024 14:28 )   PT: 13.8 sec;   INR: 1.23 ratio         PTT - ( 2024 14:28 )  PTT:34.5 sec      Urinalysis Basic - ( 2024 15:42 )    Color: Yellow / Appearance: Clear / S.023 / pH: x  Gluc: x / Ketone: Negative mg/dL  / Bili: Negative / Urobili: 0.2 mg/dL   Blood: x / Protein: 100 mg/dL / Nitrite: Negative   Leuk Esterase: Negative / RBC: 1 /HPF / WBC 1 /HPF   Sq Epi: x / Non Sq Epi: 1 /HPF / Bacteria: Negative /HPF          EKG:  AT Fib with  BPM  TELEMETRY:  AT Fib with -130 BPM      < from: CT Angio Chest PE Protocol w/ IV Cont (24 @ 15:35) >  IMPRESSION:  Small area of consolidation in the inferior lingula may represent   pneumonia versus atelectasis.  No evidence of pulmonary embolism  No acute pathology in the abdomen or pelvis    < end of copied text >      CARDIAC TESTS:    RADIOLOGY & ADDITIONAL STUDIES:

## 2024-04-01 NOTE — ED PROVIDER NOTE - OBJECTIVE STATEMENT
39 y/o male with PMHx of HTN, HLD presents to the ED SIB outpatient MD for new onset rapid Afib. Patient c/o cough, SOB on exertion, abdominal pain/distention, and intermittent fevers. Currently on doxycycline, azithromycin, and medrol dose pack for pneumonia. Denies chest pain.

## 2024-04-01 NOTE — CHART NOTE - NSCHARTNOTEFT_GEN_A_CORE
Contacted by ED re: this pt w/ afib w/ RVR in ED.  Afib is a new diagnosis.  37 y/o.  Also being treated for pna as noted on CXR.    Agree w/ adding metoprolol tartrate for rate control  as HRs in 140s on diltiazem gtt at 20.  If rates still elevated >100 resting & SBPs <100s after metoprolol initiation  & uptitration, consider Load w/ dig  1st dose 0.5 mg IV, then dig 0.25 mg IV 6 hrs after initial dig dose.    Keep NPO after midnight (ordered) except  meds - should be on anticoag - for  possible SERA & CV tommorrow if afib persists.

## 2024-04-01 NOTE — ED ADULT NURSE REASSESSMENT NOTE - NS ED NURSE REASSESS COMMENT FT1
Assumed pt care at 2010. Dr Watkins in attendance BP 91/75, Cardizem gtt decreased from 20 mg/hr to 5mg /hr, per Dr Keith order ,HR 98 afib.  IVABT Vancomycin in progress for pneumonia. Pt remains aox3, no SOB, abd obesely distended, NPO for possible procedure tomorrow. Report given to cardiac unit by ANNALISA Landaverde.

## 2024-04-01 NOTE — PATIENT PROFILE ADULT - FALL HARM RISK - HARM RISK INTERVENTIONS

## 2024-04-01 NOTE — H&P ADULT - HISTORY OF PRESENT ILLNESS
39 y/o M w/ PMH of HTN, asthma, sleep apnea (non-adherent w/ CPAP), dyslipidemia, p/w cough. Patient states he has been coughing for approximately 1.5 week. Cough is productive of brown sputum. Patient has been having intermittent fevers and chills. Patient has also been having associated dyspnea on exertion and palpitations. States he has been taking oral antibiotics and steroids outpatient without improvement.  Also feels like his abdomen is distended and c/o abdominal discomfort as well. Patient had 1 episode of vomiting on Thursday. Denies diarrhea / CP.       PSH: nasal surgery     Social Hx: Tobacco - 3-4 cigarettes daily, but plans to quit after today, etoh - 3-4 drinks on the weekend. However patient states just came back from a 12 day cruise and had about 10 drinks per day. Drugs - denies     Family Hx:  H/o CAD and kidney disease in maternal grandmother and maternal aunt

## 2024-04-01 NOTE — PATIENT PROFILE ADULT - HAVE YOU RECENTLY LOST WEIGHT WITHOUT TRYING?
OVERNIGHT EVENTS:    SUBJECTIVE / INTERVAL HPI: Patient seen and examined at bedside.     VITAL SIGNS:  Vital Signs Last 24 Hrs  T(C): 36.6 (10 Feb 2024 06:19), Max: 36.6 (10 Feb 2024 06:19)  T(F): 97.9 (10 Feb 2024 06:19), Max: 97.9 (10 Feb 2024 06:19)  HR: 88 (10 Feb 2024 06:19) (86 - 88)  BP: 111/68 (10 Feb 2024 06:19) (102/62 - 111/68)  BP(mean): 82 (10 Feb 2024 06:19) (82 - 82)  RR: 17 (10 Feb 2024 06:19) (17 - 17)  SpO2: 93% (10 Feb 2024 06:19) (93% - 96%)    Parameters below as of 10 Feb 2024 06:19  Patient On (Oxygen Delivery Method): room air        PHYSICAL EXAM:    General: alert, in no acute distress  HEENT: NC/AT; PERRL, anicteric sclera; MMM  Neck: supple  Cardiovascular: +S1/S2, RRR  Respiratory: CTA B/L; no W/R/R  Gastrointestinal: soft, NT/ND; +BSx4  Extremities: WWP; no edema, clubbing or cyanosis  Vascular: 2+ radial, DP/PT pulses B/L  Neurological: AAOx3; no focal deficits    MEDICATIONS:  MEDICATIONS  (STANDING):  cefTRIAXone   IVPB 1000 milliGRAM(s) IV Intermittent every 24 hours  enoxaparin Injectable 30 milliGRAM(s) SubCutaneous every 24 hours  lactated ringers. 1000 milliLiter(s) (150 mL/Hr) IV Continuous <Continuous>  multivitamin/minerals 1 Tablet(s) Oral daily  pantoprazole    Tablet 40 milliGRAM(s) Oral before breakfast  vancomycin    Solution 125 milliGRAM(s) Oral every 6 hours    MEDICATIONS  (PRN):  acetaminophen     Tablet .. 650 milliGRAM(s) Oral every 6 hours PRN Temp greater or equal to 38C (100.4F), Mild Pain (1 - 3)  aluminum hydroxide/magnesium hydroxide/simethicone Suspension 30 milliLiter(s) Oral every 4 hours PRN Dyspepsia  melatonin 3 milliGRAM(s) Oral at bedtime PRN Insomnia  ondansetron Injectable 4 milliGRAM(s) IV Push every 8 hours PRN Nausea and/or Vomiting      ALLERGIES:  Allergies    fish (Rash)  No Known Drug Allergies    Intolerances        LABS:                        11.5   13.52 )-----------( 245      ( 10 Feb 2024 05:30 )             35.8     02-10    140  |  106  |  6<L>  ----------------------------<  103<H>  3.9   |  26  |  0.75    Ca    8.9      10 Feb 2024 05:30  Phos  3.1     02-10  Mg     2.1     02-10    TPro  5.6<L>  /  Alb  3.0<L>  /  TBili  0.3  /  DBili  x   /  AST  23  /  ALT  53<H>  /  AlkPhos  274<H>  02-10      Urinalysis Basic - ( 10 Feb 2024 05:30 )    Color: x / Appearance: x / SG: x / pH: x  Gluc: 103 mg/dL / Ketone: x  / Bili: x / Urobili: x   Blood: x / Protein: x / Nitrite: x   Leuk Esterase: x / RBC: x / WBC x   Sq Epi: x / Non Sq Epi: x / Bacteria: x      CAPILLARY BLOOD GLUCOSE          RADIOLOGY & ADDITIONAL TESTS: Reviewed. OVERNIGHT EVENTS: ARAMIS    SUBJECTIVE / INTERVAL HPI: Patient seen and examined at bedside. States she is feeling better, able to tolerate PO - bowel movements are more formed and less frequent     VITAL SIGNS:  Vital Signs Last 24 Hrs  T(C): 36.6 (10 Feb 2024 06:19), Max: 36.6 (10 Feb 2024 06:19)  T(F): 97.9 (10 Feb 2024 06:19), Max: 97.9 (10 Feb 2024 06:19)  HR: 88 (10 Feb 2024 06:19) (86 - 88)  BP: 111/68 (10 Feb 2024 06:19) (102/62 - 111/68)  BP(mean): 82 (10 Feb 2024 06:19) (82 - 82)  RR: 17 (10 Feb 2024 06:19) (17 - 17)  SpO2: 93% (10 Feb 2024 06:19) (93% - 96%)    Parameters below as of 10 Feb 2024 06:19  Patient On (Oxygen Delivery Method): room air        PHYSICAL EXAM:    General: alert, in no acute distress  HEENT: NC/AT; PERRL, anicteric sclera; MMM  Neck: supple  Cardiovascular: +S1/S2, RRR  Respiratory: CTA B/L; no W/R/R  Gastrointestinal: soft, NT/ND; +BSx4  Extremities: WWP; no edema, clubbing or cyanosis  Vascular: 2+ radial, DP/PT pulses B/L  Neurological: AAOx3; no focal deficits    MEDICATIONS:  MEDICATIONS  (STANDING):  cefTRIAXone   IVPB 1000 milliGRAM(s) IV Intermittent every 24 hours  enoxaparin Injectable 30 milliGRAM(s) SubCutaneous every 24 hours  lactated ringers. 1000 milliLiter(s) (150 mL/Hr) IV Continuous <Continuous>  multivitamin/minerals 1 Tablet(s) Oral daily  pantoprazole    Tablet 40 milliGRAM(s) Oral before breakfast  vancomycin    Solution 125 milliGRAM(s) Oral every 6 hours    MEDICATIONS  (PRN):  acetaminophen     Tablet .. 650 milliGRAM(s) Oral every 6 hours PRN Temp greater or equal to 38C (100.4F), Mild Pain (1 - 3)  aluminum hydroxide/magnesium hydroxide/simethicone Suspension 30 milliLiter(s) Oral every 4 hours PRN Dyspepsia  melatonin 3 milliGRAM(s) Oral at bedtime PRN Insomnia  ondansetron Injectable 4 milliGRAM(s) IV Push every 8 hours PRN Nausea and/or Vomiting      ALLERGIES:  Allergies    fish (Rash)  No Known Drug Allergies    Intolerances        LABS:                        11.5   13.52 )-----------( 245      ( 10 Feb 2024 05:30 )             35.8     02-10    140  |  106  |  6<L>  ----------------------------<  103<H>  3.9   |  26  |  0.75    Ca    8.9      10 Feb 2024 05:30  Phos  3.1     02-10  Mg     2.1     02-10    TPro  5.6<L>  /  Alb  3.0<L>  /  TBili  0.3  /  DBili  x   /  AST  23  /  ALT  53<H>  /  AlkPhos  274<H>  02-10      Urinalysis Basic - ( 10 Feb 2024 05:30 )    Color: x / Appearance: x / SG: x / pH: x  Gluc: 103 mg/dL / Ketone: x  / Bili: x / Urobili: x   Blood: x / Protein: x / Nitrite: x   Leuk Esterase: x / RBC: x / WBC x   Sq Epi: x / Non Sq Epi: x / Bacteria: x      CAPILLARY BLOOD GLUCOSE          RADIOLOGY & ADDITIONAL TESTS: Reviewed. No (0)

## 2024-04-01 NOTE — ED ADULT NURSE NOTE - GASTROINTESTINAL ASSESSMENT
[FreeTextEntry1] : Mr.Eugene Myers is a 54 year old male with hx of ulcerative colitis with recurrent bowel obstructions due to proximal stricture which on initial biopsy around the rim showing 1 area with at least intramucosal adenocarcinoma. In the rectum, there was also an ulcerated area with focal high-grade dysplasia in the background of low-grade dysplasia. Repeat colonoscopy and MRI pelvis did not show disease in the rectum.  Now s/p robotic assisted right hemicolectomy for hepatic flexure mass consistent with stage IIIC colon cancer.  High risk Stage III (N2b disease) 8/23 LN positive + LVI, PNI Margins neg VAL  Discussed in detail the diagnosis, prognosis and risk of recurrence.   In patients with colon cancer staged as T4, N1-2 or T any, N2 (high-risk stage III), 3 months of FOLFOX is inferior to 6 months of FOLFOX for DFS, whereas non-inferiority of 3 versus 6 months of CAPEOX has not been  proven  Recommend adjuvant chemotherapy with 6 months of FOLFOX. However, patient would like to defer port placement, therefore would have to be CAPEOX. Discussed my concern with Xeloda given hx of UC and diarrhea, however patient prefers to try Xeloda.  Educational material of treatment plan provided to the patient.   >CBC, CMP prior to treatments, CEA monthly > signatera sent- result in process. >CT C/A/P- scheduled on 4/5/24 >plan to start adjuvant treatment on 4/10/24  Return on treatment day  TREATMENT PLAN: Capecitabine 850-1000mg/m2 po twice daily Day 1- Day 14 followed by 7 days off Oxaliplatin 130 mg/m2 IV over 2 hours every 21 days for 6 months    
- - -

## 2024-04-01 NOTE — ED PROVIDER NOTE - CLINICAL SUMMARY MEDICAL DECISION MAKING FREE TEXT BOX
Adult male with new onset Afib. In rapid Afib, normal blood pressure, rectal temperature 99 F. Plan for rate control, IV antibiotics, CT chest/abd/pelvis, and admit for cardiology consult, telemetry, echo, pneumonia.

## 2024-04-01 NOTE — ED ADULT NURSE NOTE - NSFALLUNIVINTERV_ED_ALL_ED
Bed/Stretcher in lowest position, wheels locked, appropriate side rails in place/Call bell, personal items and telephone in reach/Instruct patient to call for assistance before getting out of bed/chair/stretcher/Non-slip footwear applied when patient is off stretcher/[place] to call system/Physically safe environment - no spills, clutter or unnecessary equipment/Purposeful proactive rounding/Room/bathroom lighting operational, light cord in reach

## 2024-04-01 NOTE — ED ADULT NURSE REASSESSMENT NOTE - NS ED NURSE REASSESS COMMENT FT1
Pt received from day RN, Pt sitting up comfortably in stretcher with family member at bedside. Pt endorses ease of ability to breathe, pt still endorses mild abdominal discomfort but states he has not had a bowel movement recently.

## 2024-04-01 NOTE — H&P ADULT - CONVERSATION DETAILS
Patient's mother Chely at bedside is HCP. Patient is confirmed to be full resuscitation status for this admission

## 2024-04-01 NOTE — ED PROVIDER NOTE - PROGRESS NOTE DETAILS
Reid Sosa   I consulted Dr Suresh with cardiology, recommends 25mg Metoprolol BID and maxed out dil drip and will see pt in morning. I also spoke to electrophysiology who will come and see pt.

## 2024-04-01 NOTE — ED ADULT NURSE REASSESSMENT NOTE - NS ED NURSE REASSESS COMMENT FT1
cardizem drip initiated as per MAR. Pt resting in bed comfortably and up to date on plan of care. Pt ambulated safely to the bathroom with no assistance, no other verbal complaints at this time.

## 2024-04-01 NOTE — ED ADULT NURSE REASSESSMENT NOTE - NS ED NURSE REASSESS COMMENT FT1
pt resting comfortably in stretcher, ambulated safely to the bathroom. pt medicated as per MAR and provided sandwich and water. pt updated on plan of care, no other verbal complaints at this time

## 2024-04-01 NOTE — H&P ADULT - ASSESSMENT
39 y/o M w/ PMH of HTN, asthma, sleep apnea (non-adherent w/ CPAP), dyslipidemia, p/w cough    *A-fib w/ RVR  -On Cardizem drip. Will need to monitor BP closely   -Patient evaluated by Cardio and EP with recommendations  -NPO after MN  -As per EP, will start anticoagulation (risks vs benefits discussed with patient, including risk of serious bleeding)   -Echo: Moderate, diffuse hypokinesis, EF = 35-40%, moerate Mitral regurg, mild triscuspid valve regur, mild pulm HTN  -Troponin negative x 1   -Given possible CHF, will need to consider starting BB / ARB in the future. Currently patient has borerline BP   -Anti-hypertensives on hold as BP is borderline on cardizem drip     *Sepsis 2/2 Pneumonia (possible gram negative source)  -CT Chest: Small area of consolidation in the inferior lingula   -Patient failed outpatient antibiotics, and is s/p Vanco / Cefepime in ED. Will continue and consult ID   -F/u blood cx   -Lactate = 1.9  -RVP   -Pulse ox monitoring   -Will avoid aggressive hydration as patient's EF is 35-40%    *Asthma exacerbation and h/o sleep apnea  -Albuterol / Spiriva / steroids  -Pulm consult  -Pulse ox monitoring    *New Transaminitis - Possibl 2/2 Etoh use   -CT Abd: Liver within normal limits   -GI consult   -Hepatitis panel  -Etoh cessation   -Hold statin     *Suspect MARIANELA vs CKD?  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 Will need to monitor BP closely   -Patient evaluated by Cardio and EP with recommendations  -NPO after MN  -As per EP, will start anticoagulation (risks vs benefits discussed with patient, including risk of serious bleeding)   -Echo: Moderate, diffuse hypokinesis, EF = 35-40%, moerate Mitral regurg, mild triscuspid valve regur, mild pulm HTN  -Troponin negative x 1   -Given possible CHF, will need to consider starting BB / ARB in the future. Currently patient has borerline BP   -Anti-hypertensives on hold as BP is borderline on cardizem drip     *Sepsis 2/2 Pneumonia (possible gram negative source)  -CT Chest: Small area of consolidation in the inferior lingula   -Patient failed outpatient antibiotics, and is s/p Vanco / Cefepime in ED. Will continue and consult ID   -F/u blood cx   -Lactate = 1.9  -RVP   -Pulse ox monitoring   -Will avoid aggressive hydration as patient's EF is 35-40%    *Asthma exacerbation and h/o sleep apnea  -Albuterol / Spiriva / steroids  -Pulse ox monitoring  -If improving, can f/u outpatient w/ Pulm     *New Transaminitis - Possibl 2/2 Etoh use   -CT Abd: Liver within normal limits   -GI consult   -Hepatitis panel  -Etoh cessation   -Hold statin     *Suspect MARIANELA vs CKD?  -No hydronephrosis on CT   -Trend creatinine in AM to check for improvement  -Avoid nephrotoxic agents  -ARB on hold   -I/Os   -UA     *Incidentally noted umiblical hernia + B/L L5 lysis defects  -F/u outpatient     *Pre-diabetes  -Will start ISS while patient is on steroids     *DVT ppx   -Heparin drip     >75 mins required for admission

## 2024-04-01 NOTE — ED ADULT NURSE NOTE - OBJECTIVE STATEMENT
Pt A&Ox4. Pt afib 150's on CMS. Pt c/o right sided pleural pain and abd pain. Pt states that he is recovering from pneumonia last week started on Prednisone and Doxy. Pt denies chest pain or pressure. Generalized edema noted. EKG complete. Pt sats 98% on RA. Pt with no additional complaints. Family bedside and updated on POC.

## 2024-04-02 DIAGNOSIS — R06.02 SHORTNESS OF BREATH: ICD-10-CM

## 2024-04-02 DIAGNOSIS — I48.91 UNSPECIFIED ATRIAL FIBRILLATION: ICD-10-CM

## 2024-04-02 DIAGNOSIS — I50.21 ACUTE SYSTOLIC (CONGESTIVE) HEART FAILURE: ICD-10-CM

## 2024-04-02 DIAGNOSIS — I10 ESSENTIAL (PRIMARY) HYPERTENSION: ICD-10-CM

## 2024-04-02 DIAGNOSIS — E78.5 HYPERLIPIDEMIA, UNSPECIFIED: ICD-10-CM

## 2024-04-02 LAB
A1C WITH ESTIMATED AVERAGE GLUCOSE RESULT: 6.9 % — HIGH (ref 4–5.6)
ADD ON TEST-SPECIMEN IN LAB: SIGNIFICANT CHANGE UP
ALBUMIN SERPL ELPH-MCNC: 3.2 G/DL — LOW (ref 3.3–5)
ALP SERPL-CCNC: 79 U/L — SIGNIFICANT CHANGE UP (ref 40–120)
ALT FLD-CCNC: 334 U/L — HIGH (ref 12–78)
ANION GAP SERPL CALC-SCNC: 5 MMOL/L — SIGNIFICANT CHANGE UP (ref 5–17)
APTT BLD: 144.5 SEC — CRITICAL HIGH (ref 24.5–35.6)
APTT BLD: 55.8 SEC — HIGH (ref 24.5–35.6)
APTT BLD: 89.8 SEC — HIGH (ref 24.5–35.6)
AST SERPL-CCNC: 95 U/L — HIGH (ref 15–37)
BASOPHILS # BLD AUTO: 0.04 K/UL — SIGNIFICANT CHANGE UP (ref 0–0.2)
BASOPHILS NFR BLD AUTO: 0.3 % — SIGNIFICANT CHANGE UP (ref 0–2)
BILIRUB SERPL-MCNC: 1 MG/DL — SIGNIFICANT CHANGE UP (ref 0.2–1.2)
BUN SERPL-MCNC: 17 MG/DL — SIGNIFICANT CHANGE UP (ref 7–23)
CALCIUM SERPL-MCNC: 8.8 MG/DL — SIGNIFICANT CHANGE UP (ref 8.5–10.1)
CHLORIDE SERPL-SCNC: 109 MMOL/L — HIGH (ref 96–108)
CHOLEST SERPL-MCNC: 141 MG/DL — SIGNIFICANT CHANGE UP
CO2 SERPL-SCNC: 25 MMOL/L — SIGNIFICANT CHANGE UP (ref 22–31)
CREAT SERPL-MCNC: 1.18 MG/DL — SIGNIFICANT CHANGE UP (ref 0.5–1.3)
CULTURE RESULTS: NO GROWTH — SIGNIFICANT CHANGE UP
EGFR: 82 ML/MIN/1.73M2 — SIGNIFICANT CHANGE UP
EOSINOPHIL # BLD AUTO: 0.1 K/UL — SIGNIFICANT CHANGE UP (ref 0–0.5)
EOSINOPHIL NFR BLD AUTO: 0.8 % — SIGNIFICANT CHANGE UP (ref 0–6)
ESTIMATED AVERAGE GLUCOSE: 151 MG/DL — HIGH (ref 68–114)
GLUCOSE BLDC GLUCOMTR-MCNC: 139 MG/DL — HIGH (ref 70–99)
GLUCOSE BLDC GLUCOMTR-MCNC: 145 MG/DL — HIGH (ref 70–99)
GLUCOSE BLDC GLUCOMTR-MCNC: 182 MG/DL — HIGH (ref 70–99)
GLUCOSE BLDC GLUCOMTR-MCNC: 192 MG/DL — HIGH (ref 70–99)
GLUCOSE SERPL-MCNC: 150 MG/DL — HIGH (ref 70–99)
HAV IGM SER-ACNC: SIGNIFICANT CHANGE UP
HBV CORE IGM SER-ACNC: SIGNIFICANT CHANGE UP
HBV SURFACE AG SER-ACNC: SIGNIFICANT CHANGE UP
HCT VFR BLD CALC: 42.3 % — SIGNIFICANT CHANGE UP (ref 39–50)
HCT VFR BLD CALC: 43.3 % — SIGNIFICANT CHANGE UP (ref 39–50)
HCV AB S/CO SERPL IA: 0.09 S/CO — SIGNIFICANT CHANGE UP (ref 0–0.99)
HCV AB SERPL-IMP: SIGNIFICANT CHANGE UP
HDLC SERPL-MCNC: 46 MG/DL — SIGNIFICANT CHANGE UP
HGB BLD-MCNC: 14.5 G/DL — SIGNIFICANT CHANGE UP (ref 13–17)
HGB BLD-MCNC: 14.7 G/DL — SIGNIFICANT CHANGE UP (ref 13–17)
HPIV3 RNA SPEC QL NAA+PROBE: DETECTED
IMM GRANULOCYTES NFR BLD AUTO: 0.8 % — SIGNIFICANT CHANGE UP (ref 0–0.9)
INR BLD: 1.32 RATIO — HIGH (ref 0.85–1.18)
LIPID PNL WITH DIRECT LDL SERPL: 79 MG/DL — SIGNIFICANT CHANGE UP
LYMPHOCYTES # BLD AUTO: 19.4 % — SIGNIFICANT CHANGE UP (ref 13–44)
LYMPHOCYTES # BLD AUTO: 2.29 K/UL — SIGNIFICANT CHANGE UP (ref 1–3.3)
MCHC RBC-ENTMCNC: 30.6 PG — SIGNIFICANT CHANGE UP (ref 27–34)
MCHC RBC-ENTMCNC: 30.7 PG — SIGNIFICANT CHANGE UP (ref 27–34)
MCHC RBC-ENTMCNC: 33.9 GM/DL — SIGNIFICANT CHANGE UP (ref 32–36)
MCHC RBC-ENTMCNC: 34.3 GM/DL — SIGNIFICANT CHANGE UP (ref 32–36)
MCV RBC AUTO: 89.6 FL — SIGNIFICANT CHANGE UP (ref 80–100)
MCV RBC AUTO: 90.2 FL — SIGNIFICANT CHANGE UP (ref 80–100)
MONOCYTES # BLD AUTO: 1.22 K/UL — HIGH (ref 0–0.9)
MONOCYTES NFR BLD AUTO: 10.3 % — SIGNIFICANT CHANGE UP (ref 2–14)
NEUTROPHILS # BLD AUTO: 8.04 K/UL — HIGH (ref 1.8–7.4)
NEUTROPHILS NFR BLD AUTO: 68.4 % — SIGNIFICANT CHANGE UP (ref 43–77)
NON HDL CHOLESTEROL: 95 MG/DL — SIGNIFICANT CHANGE UP
NT-PROBNP SERPL-SCNC: 1746 PG/ML — HIGH (ref 0–125)
PLATELET # BLD AUTO: 249 K/UL — SIGNIFICANT CHANGE UP (ref 150–400)
PLATELET # BLD AUTO: 253 K/UL — SIGNIFICANT CHANGE UP (ref 150–400)
POTASSIUM SERPL-MCNC: 3.8 MMOL/L — SIGNIFICANT CHANGE UP (ref 3.5–5.3)
POTASSIUM SERPL-SCNC: 3.8 MMOL/L — SIGNIFICANT CHANGE UP (ref 3.5–5.3)
PROT SERPL-MCNC: 6.7 GM/DL — SIGNIFICANT CHANGE UP (ref 6–8.3)
PROTHROM AB SERPL-ACNC: 14.8 SEC — HIGH (ref 9.5–13)
RAPID RVP RESULT: DETECTED
RBC # BLD: 4.72 M/UL — SIGNIFICANT CHANGE UP (ref 4.2–5.8)
RBC # BLD: 4.8 M/UL — SIGNIFICANT CHANGE UP (ref 4.2–5.8)
RBC # FLD: 14.9 % — HIGH (ref 10.3–14.5)
RBC # FLD: 15 % — HIGH (ref 10.3–14.5)
SARS-COV-2 RNA SPEC QL NAA+PROBE: DETECTED
SODIUM SERPL-SCNC: 139 MMOL/L — SIGNIFICANT CHANGE UP (ref 135–145)
SPECIMEN SOURCE: SIGNIFICANT CHANGE UP
T4 AB SER-ACNC: 7.4 UG/DL — SIGNIFICANT CHANGE UP (ref 4.6–12)
TRIGL SERPL-MCNC: 82 MG/DL — SIGNIFICANT CHANGE UP
WBC # BLD: 11.41 K/UL — HIGH (ref 3.8–10.5)
WBC # BLD: 11.79 K/UL — HIGH (ref 3.8–10.5)
WBC # FLD AUTO: 11.41 K/UL — HIGH (ref 3.8–10.5)
WBC # FLD AUTO: 11.79 K/UL — HIGH (ref 3.8–10.5)

## 2024-04-02 PROCEDURE — 76705 ECHO EXAM OF ABDOMEN: CPT | Mod: 26

## 2024-04-02 PROCEDURE — 99223 1ST HOSP IP/OBS HIGH 75: CPT

## 2024-04-02 PROCEDURE — 99232 SBSQ HOSP IP/OBS MODERATE 35: CPT

## 2024-04-02 PROCEDURE — 93010 ELECTROCARDIOGRAM REPORT: CPT

## 2024-04-02 RX ORDER — HEPARIN SODIUM 5000 [USP'U]/ML
5000 INJECTION INTRAVENOUS; SUBCUTANEOUS EVERY 6 HOURS
Refills: 0 | Status: DISCONTINUED | OUTPATIENT
Start: 2024-04-02 | End: 2024-04-02

## 2024-04-02 RX ORDER — DEXAMETHASONE 0.5 MG/5ML
6 ELIXIR ORAL DAILY
Refills: 0 | Status: DISCONTINUED | OUTPATIENT
Start: 2024-04-03 | End: 2024-04-04

## 2024-04-02 RX ORDER — DILTIAZEM HCL 120 MG
15 CAPSULE, EXT RELEASE 24 HR ORAL
Qty: 125 | Refills: 0 | Status: DISCONTINUED | OUTPATIENT
Start: 2024-04-02 | End: 2024-04-02

## 2024-04-02 RX ORDER — FUROSEMIDE 40 MG
40 TABLET ORAL ONCE
Refills: 0 | Status: COMPLETED | OUTPATIENT
Start: 2024-04-02 | End: 2024-04-02

## 2024-04-02 RX ORDER — HEPARIN SODIUM 5000 [USP'U]/ML
5000 INJECTION INTRAVENOUS; SUBCUTANEOUS EVERY 6 HOURS
Refills: 0 | Status: DISCONTINUED | OUTPATIENT
Start: 2024-04-02 | End: 2024-04-03

## 2024-04-02 RX ORDER — DEXAMETHASONE 0.5 MG/5ML
4 ELIXIR ORAL ONCE
Refills: 0 | Status: COMPLETED | OUTPATIENT
Start: 2024-04-02 | End: 2024-04-02

## 2024-04-02 RX ORDER — CEFTRIAXONE 500 MG/1
2000 INJECTION, POWDER, FOR SOLUTION INTRAMUSCULAR; INTRAVENOUS EVERY 24 HOURS
Refills: 0 | Status: DISCONTINUED | OUTPATIENT
Start: 2024-04-02 | End: 2024-04-04

## 2024-04-02 RX ORDER — HEPARIN SODIUM 5000 [USP'U]/ML
2000 INJECTION INTRAVENOUS; SUBCUTANEOUS
Qty: 25000 | Refills: 0 | Status: DISCONTINUED | OUTPATIENT
Start: 2024-04-02 | End: 2024-04-03

## 2024-04-02 RX ORDER — INFLUENZA VIRUS VACCINE 15; 15; 15; 15 UG/.5ML; UG/.5ML; UG/.5ML; UG/.5ML
0.5 SUSPENSION INTRAMUSCULAR ONCE
Refills: 0 | Status: COMPLETED | OUTPATIENT
Start: 2024-04-02 | End: 2024-04-02

## 2024-04-02 RX ORDER — DIGOXIN 250 MCG
250 TABLET ORAL EVERY 4 HOURS
Refills: 0 | Status: COMPLETED | OUTPATIENT
Start: 2024-04-02 | End: 2024-04-02

## 2024-04-02 RX ORDER — HEPARIN SODIUM 5000 [USP'U]/ML
10000 INJECTION INTRAVENOUS; SUBCUTANEOUS EVERY 6 HOURS
Refills: 0 | Status: DISCONTINUED | OUTPATIENT
Start: 2024-04-02 | End: 2024-04-02

## 2024-04-02 RX ORDER — HEPARIN SODIUM 5000 [USP'U]/ML
10000 INJECTION INTRAVENOUS; SUBCUTANEOUS EVERY 6 HOURS
Refills: 0 | Status: DISCONTINUED | OUTPATIENT
Start: 2024-04-02 | End: 2024-04-03

## 2024-04-02 RX ORDER — ACETAMINOPHEN 500 MG
650 TABLET ORAL ONCE
Refills: 0 | Status: COMPLETED | OUTPATIENT
Start: 2024-04-02 | End: 2024-04-02

## 2024-04-02 RX ORDER — HEPARIN SODIUM 5000 [USP'U]/ML
INJECTION INTRAVENOUS; SUBCUTANEOUS
Qty: 25000 | Refills: 0 | Status: DISCONTINUED | OUTPATIENT
Start: 2024-04-02 | End: 2024-04-02

## 2024-04-02 RX ORDER — CEFTRIAXONE 500 MG/1
2000 INJECTION, POWDER, FOR SOLUTION INTRAMUSCULAR; INTRAVENOUS EVERY 24 HOURS
Refills: 0 | Status: DISCONTINUED | OUTPATIENT
Start: 2024-04-02 | End: 2024-04-02

## 2024-04-02 RX ORDER — AZITHROMYCIN 500 MG/1
500 TABLET, FILM COATED ORAL EVERY 24 HOURS
Refills: 0 | Status: DISCONTINUED | OUTPATIENT
Start: 2024-04-02 | End: 2024-04-04

## 2024-04-02 RX ORDER — LACTOBACILLUS ACIDOPHILUS 100MM CELL
1 CAPSULE ORAL
Refills: 0 | Status: DISCONTINUED | OUTPATIENT
Start: 2024-04-02 | End: 2024-04-04

## 2024-04-02 RX ORDER — DILTIAZEM HCL 120 MG
10 CAPSULE, EXT RELEASE 24 HR ORAL
Qty: 125 | Refills: 0 | Status: DISCONTINUED | OUTPATIENT
Start: 2024-04-02 | End: 2024-04-02

## 2024-04-02 RX ORDER — DILTIAZEM HCL 120 MG
5 CAPSULE, EXT RELEASE 24 HR ORAL
Qty: 125 | Refills: 0 | Status: DISCONTINUED | OUTPATIENT
Start: 2024-04-02 | End: 2024-04-03

## 2024-04-02 RX ADMIN — AZITHROMYCIN 500 MILLIGRAM(S): 500 TABLET, FILM COATED ORAL at 13:17

## 2024-04-02 RX ADMIN — HEPARIN SODIUM 2000 UNIT(S)/HR: 5000 INJECTION INTRAVENOUS; SUBCUTANEOUS at 07:21

## 2024-04-02 RX ADMIN — Medication 250 MICROGRAM(S): at 11:05

## 2024-04-02 RX ADMIN — Medication 25 MILLIGRAM(S): at 13:16

## 2024-04-02 RX ADMIN — CEFEPIME 1000 MILLIGRAM(S): 1 INJECTION, POWDER, FOR SOLUTION INTRAMUSCULAR; INTRAVENOUS at 10:56

## 2024-04-02 RX ADMIN — HEPARIN SODIUM 5000 UNIT(S): 5000 INJECTION INTRAVENOUS; SUBCUTANEOUS at 13:53

## 2024-04-02 RX ADMIN — Medication 4 MILLIGRAM(S): at 13:46

## 2024-04-02 RX ADMIN — Medication 200 MILLIGRAM(S): at 13:16

## 2024-04-02 RX ADMIN — Medication 25 MILLIGRAM(S): at 21:18

## 2024-04-02 RX ADMIN — Medication 650 MILLIGRAM(S): at 02:18

## 2024-04-02 RX ADMIN — Medication 250 MILLIGRAM(S): at 10:56

## 2024-04-02 RX ADMIN — Medication 650 MILLIGRAM(S): at 03:00

## 2024-04-02 RX ADMIN — HEPARIN SODIUM 2000 UNIT(S)/HR: 5000 INJECTION INTRAVENOUS; SUBCUTANEOUS at 10:54

## 2024-04-02 RX ADMIN — Medication 250 MICROGRAM(S): at 15:29

## 2024-04-02 RX ADMIN — Medication 40 MILLIGRAM(S): at 09:02

## 2024-04-02 RX ADMIN — Medication 5 MG/HR: at 00:31

## 2024-04-02 RX ADMIN — Medication 200 MILLIGRAM(S): at 07:07

## 2024-04-02 RX ADMIN — Medication 15 MG/HR: at 03:28

## 2024-04-02 RX ADMIN — HEPARIN SODIUM 2000 UNIT(S)/HR: 5000 INJECTION INTRAVENOUS; SUBCUTANEOUS at 07:03

## 2024-04-02 RX ADMIN — Medication 1: at 17:21

## 2024-04-02 RX ADMIN — HEPARIN SODIUM 2300 UNIT(S)/HR: 5000 INJECTION INTRAVENOUS; SUBCUTANEOUS at 13:49

## 2024-04-02 RX ADMIN — HEPARIN SODIUM 2300 UNIT(S)/HR: 5000 INJECTION INTRAVENOUS; SUBCUTANEOUS at 19:22

## 2024-04-02 RX ADMIN — CEFTRIAXONE 2000 MILLIGRAM(S): 500 INJECTION, POWDER, FOR SOLUTION INTRAMUSCULAR; INTRAVENOUS at 21:18

## 2024-04-02 RX ADMIN — Medication 1: at 13:15

## 2024-04-02 RX ADMIN — Medication 200 MILLIGRAM(S): at 21:18

## 2024-04-02 RX ADMIN — Medication 200 MILLIGRAM(S): at 21:17

## 2024-04-02 RX ADMIN — Medication 1 TABLET(S): at 17:18

## 2024-04-02 RX ADMIN — HEPARIN SODIUM 2300 UNIT(S)/HR: 5000 INJECTION INTRAVENOUS; SUBCUTANEOUS at 23:33

## 2024-04-02 RX ADMIN — Medication 15 MG/HR: at 11:11

## 2024-04-02 RX ADMIN — HEPARIN SODIUM 2300 UNIT(S)/HR: 5000 INJECTION INTRAVENOUS; SUBCUTANEOUS at 20:40

## 2024-04-02 RX ADMIN — TIOTROPIUM BROMIDE 2 PUFF(S): 18 CAPSULE ORAL; RESPIRATORY (INHALATION) at 08:43

## 2024-04-02 RX ADMIN — Medication 15 MG/HR: at 20:41

## 2024-04-02 RX ADMIN — Medication 10 MG/HR: at 02:18

## 2024-04-02 NOTE — CONSULT NOTE ADULT - PROBLEM SELECTOR RECOMMENDATION 9
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

## 2024-04-02 NOTE — CONSULT NOTE ADULT - SUBJECTIVE AND OBJECTIVE BOX
HPI:  39 y/o M w/ PMH of HTN, asthma, sleep apnea (non-adherent w/ CPAP), dyslipidemia, p/w cough. Patient states he has been coughing for approximately 1.5 week. Cough is productive of brown sputum. Patient has been having intermittent fevers and chills. Patient has also been having associated dyspnea on exertion and palpitations. States he has been taking oral antibiotics and steroids outpatient without improvement.  Also feels like his abdomen is distended and c/o abdominal discomfort as well. Patient had 1 episode of vomiting on Thursday. Denies diarrhea / CP.       PSH: nasal surgery     Social Hx: Tobacco - 3-4 cigarettes daily, but plans to quit after today, etoh - 3-4 drinks on the weekend. However patient states just came back from a 12 day cruise and had about 10 drinks per day. Drugs - denies     Family Hx:  H/o CAD and kidney disease in maternal grandmother and maternal aunt    (01 Apr 2024 20:03)  -----------------  Here, LFTs were elevated and he does note some mild RUQ pain    PAST MEDICAL & SURGICAL HISTORY:  HTN (hypertension)      HLD (hyperlipidemia)          Home Medications:  amLODIPine 10 mg oral tablet: 1 tab(s) orally (01 Apr 2024 18:20)  azithromycin 250 mg oral tablet: orally *** Take 2 tablets by mouth today, then take 1 tablet daily for 4 days *** (01 Apr 2024 18:20)  benzonatate 200 mg oral capsule: 1 cap(s) orally 3 times a day (01 Apr 2024 18:20)  doxycycline hyclate 100 mg oral tablet: 1 tab(s) orally 2 times a day (01 Apr 2024 18:20)  fexofenadine 180 mg oral tablet: 1 tab(s) orally once a day (01 Apr 2024 18:20)  losartan 50 mg oral tablet: 1 tab(s) orally once a day (01 Apr 2024 18:20)  methylPREDNISolone 4 mg oral tablet: orally *** take 6 tablets on day 1 and decrease by 1 tab each day for a total of 6 days*** (01 Apr 2024 18:20)  rosuvastatin 5 mg oral tablet: 1 tab(s) orally once a day (01 Apr 2024 16:59)      MEDICATIONS  (STANDING):  benzonatate 200 milliGRAM(s) Oral three times a day  cefepime  Injectable. 1000 milliGRAM(s) IV Push every 12 hours  dextrose 5%. 1000 milliLiter(s) (100 mL/Hr) IV Continuous <Continuous>  dextrose 5%. 1000 milliLiter(s) (50 mL/Hr) IV Continuous <Continuous>  dextrose 50% Injectable 25 Gram(s) IV Push once  dextrose 50% Injectable 12.5 Gram(s) IV Push once  dextrose 50% Injectable 25 Gram(s) IV Push once  digoxin  Injectable 250 MICROGram(s) IV Push every 4 hours  diltiazem Infusion 15 mG/Hr (15 mL/Hr) IV Continuous <Continuous>  glucagon  Injectable 1 milliGRAM(s) IntraMuscular once  heparin  Infusion. 2000 Unit(s)/Hr (20 mL/Hr) IV Continuous <Continuous>  influenza   Vaccine 0.5 milliLiter(s) IntraMuscular once  insulin lispro (ADMELOG) corrective regimen sliding scale   SubCutaneous at bedtime  insulin lispro (ADMELOG) corrective regimen sliding scale   SubCutaneous three times a day before meals  metoprolol tartrate 25 milliGRAM(s) Oral two times a day  predniSONE   Tablet 30 milliGRAM(s) Oral daily  tiotropium 2.5 MICROgram(s) Inhaler 2 Puff(s) Inhalation daily    MEDICATIONS  (PRN):  albuterol    90 MICROgram(s) HFA Inhaler 2 Puff(s) Inhalation every 6 hours PRN Bronchospasm  dextrose Oral Gel 15 Gram(s) Oral once PRN Blood Glucose LESS THAN 70 milliGRAM(s)/deciliter  heparin   Injectable 42061 Unit(s) IV Push every 6 hours PRN For aPTT less than 40  heparin   Injectable 5000 Unit(s) IV Push every 6 hours PRN For aPTT between 40 - 57      Allergies    penicillins (Unknown)    Intolerances        SOCIAL HISTORY:    FAMILY HISTORY:  FH: CAD (coronary artery disease)    FH: type 2 diabetes    FH: HTN (hypertension)        ROS  As above  Otherwise unremarkable    Vital Signs Last 24 Hrs  T(C): 37.6 (02 Apr 2024 08:06), Max: 38.3 (02 Apr 2024 01:46)  T(F): 99.7 (02 Apr 2024 08:06), Max: 100.9 (02 Apr 2024 01:46)  HR: 122 (02 Apr 2024 11:05) (75 - 155)  BP: 145/117 (02 Apr 2024 11:05) (109/85 - 151/110)  BP(mean): 122 (02 Apr 2024 08:06) (95 - 122)  RR: 18 (02 Apr 2024 08:06) (18 - 32)  SpO2: 94% (02 Apr 2024 08:06) (91% - 98%)    Parameters below as of 02 Apr 2024 08:06  Patient On (Oxygen Delivery Method): nasal cannula  O2 Flow (L/min): 3      Constitutional: NAD, well-developed  Respiratory: CTAB  Cardiovascular: S1 and S2, RRR  Gastrointestinal: BS+, soft, NT/ND  Extremities: No peripheral edema  Psychiatric: Normal mood, normal affect  Skin: No rashes    LABS:                        14.5   11.79 )-----------( 249      ( 02 Apr 2024 04:58 )             42.3     04-02    139  |  109<H>  |  17  ----------------------------<  150<H>  3.8   |  25  |  1.18    Ca    8.8      02 Apr 2024 04:58  Mg     2.0     04-01    TPro  6.7  /  Alb  3.2<L>  /  TBili  1.0  /  DBili  x   /  AST  95<H>  /  ALT  334<H>  /  AlkPhos  79  04-02    PT/INR - ( 02 Apr 2024 04:58 )   PT: 14.8 sec;   INR: 1.32 ratio         PTT - ( 02 Apr 2024 04:58 )  PTT:144.5 sec  LIVER FUNCTIONS - ( 02 Apr 2024 04:58 )  Alb: 3.2 g/dL / Pro: 6.7 gm/dL / ALK PHOS: 79 U/L / ALT: 334 U/L / AST: 95 U/L / GGT: x             RADIOLOGY & ADDITIONAL STUDIES:

## 2024-04-02 NOTE — CONSULT NOTE ADULT - PROBLEM SELECTOR RECOMMENDATION 2
New onset  afib with rapid  in the setting  pneumonia covid + , underlying hypertension hypertensive heart disease m ,morbid obesity   continue IV heparin and cardizem drip 15 mg /hr     follow up ECHO , will need SERA cardioversion once his respiratory status better ,  will speak to cath lab     will need sleep study as OP , encourage to loose weight , New onset  afib with rapid  in the setting  pneumonia covid + , underlying hypertension hypertensive heart disease m ,morbid obesity   continue IV heparin and cardizem drip 15 mg /hr      , will need SERA cardioversion once his respiratory status better ,  will speak to cath lab     will need sleep study as OP , encourage to loose weight ,

## 2024-04-02 NOTE — CONSULT NOTE ADULT - ASSESSMENT
37 y/o M w/ PMH of HTN, asthma, sleep apnea (non-adherent w/ CPAP), dyslipidemia, p/w cough. Patient states he has been coughing for approximately 1.5 week. Cough is productive of brown sputum. Patient has been having intermittent fevers and chills. Patient has also been having associated dyspnea on exertion and palpitations. States he has been taking oral antibiotics and steroids outpatient without improvement.  Also feels like his abdomen is distended and c/o abdominal discomfort as well. Patient had 1 episode of vomiting on Thursday. Denies diarrhea / CP.     1.  37 y/o M w/ PMH of HTN, asthma, sleep apnea (non-adherent w/ CPAP), dyslipidemia, p/w cough. Patient states he has been coughing for approximately 1.5 week. Cough is productive of brown sputum. Patient has been having intermittent fevers and chills. Patient has also been having associated dyspnea on exertion and palpitations. States he has been taking oral antibiotics and steroids outpatient without improvement.  Also feels like his abdomen is distended and c/o abdominal discomfort as well. Patient had 1 episode of vomiting on Thursday. Denies diarrhea / CP.     1. Febrile syndrome. PNA? Covid 19 viral syndrome. Parainfluenza.   -BC x 2 pending  -urine c/s pending  -Consider sputum c/s  -on vancomycin 2000 mg IV q12h and cefepime 1000 mg IV q12h  -tolerating abx well so far; no side effects noted  -obtain vancomycin trough level   -tolerating antibiotics well  -continue monitor temps  -f/u CBC  -supportive care  2. Other issues:   -care per medicine    Clinical team may change from intravenous to oral antibiotics when the following criteria are met:   1. Patient is clinically improving/stable       a)	Improved signs and symptoms of infection from initial presentation       b)	Afebrile for 24 hours       c)	Leukocytosis trending towards normal range   2. Patient is tolerating oral intake   3. Initial/repeat blood cultures are negative     When above criteria met may change iv antibiotics to an appropriate oral agent  Cannot advise changing to oral antibiotic therapy until culture sensitivity is available.   39 y/o M w/ PMH of HTN, asthma, sleep apnea (non-adherent w/ CPAP), dyslipidemia, p/w cough. Patient states he has been coughing for approximately 1.5 week. Cough is productive of brown sputum. Patient has been having intermittent fevers and chills. Patient has also been having associated dyspnea on exertion and palpitations. States he has been taking oral antibiotics and steroids outpatient without improvement.  Also feels like his abdomen is distended and c/o abdominal discomfort as well. Patient had 1 episode of vomiting on Thursday. Denies diarrhea / CP. Here noted with lingular consolidation on imaging RVP positive for covid19, parainfluenza. Pt was given IV abx, steroids. Noted to have recent [place]. Has elevated LFTs.     1.  Fever. Viral syndrome. Covid 19 viral syndrome. Parainfluenza. Superimposed pneumonia. Morbid obesity   - imaging reviewed   - change cefepime to rocephin 2gm daily  - add azithromycin 500mg daily   - check sputum cx, f/u cultures, legionella ag  - gi eval noted f/u lfts, f/u hep panel, us abd  - continue with abx coverage  - if hypoxic, will add remdesivir  - on IV steroids  - tolerating abx well so far; no side effects noted  - reason for abx use and side effects reviewed with patient  - f/u CBC  - supportive care    2. Other issues:   -care per medicine    Clinical team may change from intravenous to oral antibiotics when the following criteria are met:   1. Patient is clinically improving/stable       a)	Improved signs and symptoms of infection from initial presentation       b)	Afebrile for 24 hours       c)	Leukocytosis trending towards normal range   2. Patient is tolerating oral intake   3. Initial/repeat blood cultures are negative     When above criteria met may change iv antibiotics to an appropriate oral agent

## 2024-04-02 NOTE — PROGRESS NOTE ADULT - SUBJECTIVE AND OBJECTIVE BOX
38y Male admitted for SOB and irregular heart beat.  Pt said it started last Thursday. He was started on Diltiazem Drip and his ventricular heart rate continues to reach 130-140s.  Dr. Tillman added on Metoprolol  He was not on any AV libby blockers at home  Found to have +COVID PNA and +Parainfluenza    4/2/24:  pt received 1 of 2 doses Dig IV, HRs are better than earlier this morning.  Tmax 100.9 overnight  TELE: atrial fib 105-120 bpm      MEDICATIONS  (STANDING):  azithromycin   Tablet 500 milliGRAM(s) Oral every 24 hours  benzonatate 200 milliGRAM(s) Oral three times a day  cefepime  Injectable. 1000 milliGRAM(s) IV Push every 12 hours  dextrose 5%. 1000 milliLiter(s) (100 mL/Hr) IV Continuous <Continuous>  dextrose 5%. 1000 milliLiter(s) (50 mL/Hr) IV Continuous <Continuous>  dextrose 50% Injectable 25 Gram(s) IV Push once  dextrose 50% Injectable 12.5 Gram(s) IV Push once  dextrose 50% Injectable 25 Gram(s) IV Push once  digoxin  Injectable 250 MICROGram(s) IV Push every 4 hours  diltiazem Infusion 15 mG/Hr (15 mL/Hr) IV Continuous <Continuous>  glucagon  Injectable 1 milliGRAM(s) IntraMuscular once  heparin  Infusion. 2000 Unit(s)/Hr (20 mL/Hr) IV Continuous <Continuous>  influenza   Vaccine 0.5 milliLiter(s) IntraMuscular once  insulin lispro (ADMELOG) corrective regimen sliding scale   SubCutaneous at bedtime  insulin lispro (ADMELOG) corrective regimen sliding scale   SubCutaneous three times a day before meals  lactobacillus acidophilus 1 Tablet(s) Oral three times a day with meals  metoprolol tartrate 25 milliGRAM(s) Oral two times a day  tiotropium 2.5 MICROgram(s) Inhaler 2 Puff(s) Inhalation daily    MEDICATIONS  (PRN):  albuterol    90 MICROgram(s) HFA Inhaler 2 Puff(s) Inhalation every 6 hours PRN Bronchospasm  dextrose Oral Gel 15 Gram(s) Oral once PRN Blood Glucose LESS THAN 70 milliGRAM(s)/deciliter  heparin   Injectable 84349 Unit(s) IV Push every 6 hours PRN For aPTT less than 40  heparin   Injectable 5000 Unit(s) IV Push every 6 hours PRN For aPTT between 40 - 57    Allergies    penicillins (Unknown)    Intolerances    Vital Signs Last 24 Hrs  T(C): 37.6 (02 Apr 2024 08:06), Max: 38.3 (02 Apr 2024 01:46)  T(F): 99.7 (02 Apr 2024 08:06), Max: 100.9 (02 Apr 2024 01:46)  HR: 122 (02 Apr 2024 11:05) (75 - 155)  BP: 145/117 (02 Apr 2024 11:05) (109/85 - 151/110)  BP(mean): 122 (02 Apr 2024 08:06) (95 - 122)  RR: 18 (02 Apr 2024 08:06) (18 - 32)  SpO2: 94% (02 Apr 2024 08:06) (91% - 98%)    Parameters below as of 02 Apr 2024 08:06  Patient On (Oxygen Delivery Method): nasal cannula  O2 Flow (L/min): 3                          14.5   11.79 )-----------( 249      ( 02 Apr 2024 04:58 )             42.3     04-02    139  |  109<H>  |  17  ----------------------------<  150<H>  3.8   |  25  |  1.18    Ca    8.8      02 Apr 2024 04:58  Mg     2.0     04-01    TPro  6.7  /  Alb  3.2<L>  /  TBili  1.0  /  DBili  x   /  AST  95<H>  /  ALT  334<H>  /  AlkPhos  79  04-02      < from: TTE Echo Complete w/o Contrast w/ Doppler (04.01.24 @ 18:53) >   Impression     Summary     The left ventricle is dilated with moderate, diffuse hypokinesis;   estimated left ventricular ejection fraction is 35-40 %.   Moderate concentric left ventricular hypertrophy.   The left atrium is severely dilated.   The IVC is dilated with decreased respiratory variation.   Moderate mitral regurgitation.   Mild tricuspid valve regurgitation.   Mild pulmonary hypertension.     Note: Tachycardia is noted during the exam.      < from: CT Angio Chest PE Protocol w/ IV Cont (04.01.24 @ 15:35) >  IMPRESSION:  Small area of consolidation in the inferior lingula may represent   pneumonia versus atelectasis.  No evidence of pulmonary embolism  No acute pathology in the abdomen or pelvis

## 2024-04-02 NOTE — PROGRESS NOTE ADULT - SUBJECTIVE AND OBJECTIVE BOX
Subjective:  Chief complain : dyspnea  cough     HPI:     37 y/o M w/ PMH of HTN, asthma, sleep apnea (non-adherent w/ CPAP), dyslipidemia, morbid obesity admitted on 4/1/24 w cough. Patient states he has been coughing for approximately 1.5 week. Cough is productive of brown sputum. Patient has been having intermittent fevers and chills. Patient has also been having associated dyspnea on exertion and palpitations. States he has been taking oral antibiotics and steroids outpatient without improvement.  Also feels like his abdomen is distended and c/o abdominal discomfort as well. Patient had 1 episode of vomiting on Thursday. Denies diarrhea / CP.  Social Hx: Tobacco - 3-4 cigarettes daily, but plans to quit after today, etoh - 3-4 drinks on the weekend. However patient states just came back from a 12 day cruise and had about 10 drinks per day. Drugs - denies     4/2/24 -  Patient seen and examined at bedside earlier today, + dyspnea, + productive cough, + tele - A fib with RVR to 130th , denies cp, abdominal pain    Review of system- Rest of the review of system are negative except mentioned in HPI     Vital sings reviewed for last 24 h  T(C): 37.6 (04-02-24 @ 08:06), Max: 38.3 (04-02-24 @ 01:46)  HR: 120 (04-02-24 @ 13:10) (90 - 133)  BP: 147/107 (04-02-24 @ 13:10) (109/85 - 151/110)  RR: 18 (04-02-24 @ 08:06) (18 - 22)  SpO2: 94% (04-02-24 @ 08:06) (91% - 96%)  CAPILLARY BLOOD GLUCOSE  POCT Blood Glucose.: 192 mg/dL (02 Apr 2024 13:05)  POCT Blood Glucose.: 139 mg/dL (02 Apr 2024 07:05)  POCT Blood Glucose.: 133 mg/dL (01 Apr 2024 23:40)      Physical exam:   General : NAD, appear to be of stated age    NERVOUS SYSTEM:  Alert & Oriented X3, non- focal exam, Motor Strength 5/5 B/L upper and lower extremities; DTRs 2+ intact and symmetric  HEAD:  Atraumatic, Normocephalic  EYES: EOMI, PERRLA, conjunctiva and sclera clear  HEENT: Moist mucous membranes, Supple neck , No JVD  CHEST: BS decreased at bases, + rales, +  rhonchi, +  wheezing  HEART: Regular rate and rhythm; No murmurs, no rubs or gallops  ABDOMEN: Soft, Non-tender, Non-distended; Bowel sounds present, no guarding , no peritoneal irritation   GENITOURINARY- Voiding, no suprapubic tenderness  EXTREMITIES:  2+ Peripheral Pulses, No clubbing, cyanosis,   edema  MUSCULOSKELETAL:- No muscle tenderness, Muscle tone normal, No joint tenderness, no Joint swelling,  Joint ROM –normal   SKIN-no rash, no lesion     Labs radiologic and other test : all reviewed and interpreted :                         14.7   11.41 )-----------( 253      ( 02 Apr 2024 12:56 )             43.3     04-02    139  |  109<H>  |  17  ----------------------------<  150<H>  3.8   |  25  |  1.18    Ca    8.8      02 Apr 2024 04:58  Mg     2.0     04-01    TPro  6.7  /  Alb  3.2<L>  /  TBili  1.0  /  DBili  x   /  AST  95<H>  /  ALT  334<H>  /  AlkPhos  79  04-02    PT/INR - ( 02 Apr 2024 04:58 )   PT: 14.8 sec;   INR: 1.32 ratio         PTT - ( 02 Apr 2024 12:56 )  PTT:55.8 sec     US Abdomen Upper Quadrant Right (04.02.24 @ 16:24) >    IMPRESSION:  No gallstones, gallbladder wall thickening or pericholecystic fluid.        CT Abdomen and Pelvis w/ IV Cont (04.01.24 @ 15:35) >  IMPRESSION:  Small area of consolidation in the inferior lingula may represent   pneumonia versus atelectasis.  No evidence of pulmonary embolism  No acute pathology in the abdomen or pelvis            RECENT CULTURES:      Cardiac testing : reviewed   EKG    12 Lead ECG (04.01.24 @ 13:44) >  Ventricular Rate 142 BPM  QTC Calculation(Bazett) 476 ms  Diagnosis Line *** Critical Test Result: High HR  Atrial fibrillation with rapid ventricular response  Nonspecific T wave abnormality  Abnormal ECG  When compared with ECG of 19-SEP-2022 18:17,  Atrial fibrillation has replaced Sinus rhythm  Non-specific change in ST segment in Anterior leads      TTE Echo Complete w/o Contrast w/ Doppler (04.01.24 @ 18:53) >   The left ventricle is dilated with moderate, diffuse hypokinesis;   estimated left ventricular ejection fraction is 35-40 %.   Moderate concentric left ventricular hypertrophy.   The left atrium is severely dilated.   The IVC is dilated with decreased respiratory variation.   Moderate mitral regurgitation.   Mild tricuspid valve regurgitation.   Mild pulmonary hypertension.     Note: Tachycardia is noted during the exam.    - TroponinI hsT: <-13.28, <-16.76      Procedures :     Devices:     Current medications:  albuterol    90 MICROgram(s) HFA Inhaler 2 Puff(s) Inhalation every 6 hours PRN  azithromycin   Tablet 500 milliGRAM(s) Oral every 24 hours  benzonatate 200 milliGRAM(s) Oral three times a day  cefTRIAXone Injectable. 2000 milliGRAM(s) IV Push every 24 hours  dextrose 5%. 1000 milliLiter(s) IV Continuous <Continuous>  dextrose 5%. 1000 milliLiter(s) IV Continuous <Continuous>  dextrose 50% Injectable 12.5 Gram(s) IV Push once  dextrose 50% Injectable 25 Gram(s) IV Push once  dextrose 50% Injectable 25 Gram(s) IV Push once  dextrose Oral Gel 15 Gram(s) Oral once PRN  diltiazem Infusion 15 mG/Hr IV Continuous <Continuous>  glucagon  Injectable 1 milliGRAM(s) IntraMuscular once  heparin   Injectable 90838 Unit(s) IV Push every 6 hours PRN  heparin   Injectable 5000 Unit(s) IV Push every 6 hours PRN  heparin  Infusion. 2000 Unit(s)/Hr IV Continuous <Continuous>  influenza   Vaccine 0.5 milliLiter(s) IntraMuscular once  insulin lispro (ADMELOG) corrective regimen sliding scale   SubCutaneous at bedtime  insulin lispro (ADMELOG) corrective regimen sliding scale   SubCutaneous three times a day before meals  lactobacillus acidophilus 1 Tablet(s) Oral three times a day with meals  metoprolol tartrate 25 milliGRAM(s) Oral two times a day  tiotropium 2.5 MICROgram(s) Inhaler 2 Puff(s) Inhalation daily

## 2024-04-02 NOTE — PROGRESS NOTE ADULT - ASSESSMENT
38 year old morbidly obese pt that presents with cough and complaints of fever who was found to be in new onset AT Fib with RVR in the setting of +COVID PNA and parainfluenza.  He has acute Heart Failure, EF 35-40%, may be tachy induced.     A/P: New onset AF with RVR in setting of covid PNA and parainfluenza  Continue tele monitoring  Continue with Cardizem drip, wean as iza for HR <100 bpm  Continue Metoprolol 25 mg PO BID  LVEF 35-40%  Continue IV Heparin, change to DOAC, suggest Eliquis 5mg PO BID  Agree with short term use of Digoxin  Continue IV antibiotics   Keep electrolytes repleted, K>4, Mg>2  SERA/DCCV per cardiology team when respiratory status is more stable and pt is afebrile.  MCOT on discharge with outpatient EP follow up.  Plan d/w Dr. Bauman, patient, RN

## 2024-04-02 NOTE — CONSULT NOTE ADULT - SUBJECTIVE AND OBJECTIVE BOX
CHIEF COMPLAINT: shortness of breath for one week ,with cough not getting better     HPI:  37 y/o M w/ PMH of HTN, asthma, sleep apnea (non-adherent w/ CPAP), dyslipidemia, p/w cough. Patient states he has been coughing for approximately 1.5 week with shortness of breath activity  Cough is productive of brown sputum. Patient has been having intermittent fevers and chills. Patient has also been having associated dyspnea on exertion and palpitations. States he has been taking oral antibiotics and steroids outpatient without improvement ,from last monday .  Also feels like his abdomen is distended and c/o abdominal discomfort as well. Patient had 1 episode of vomiting on Thursday. Denies diarrhea / CP. , Patient was noted to be atrial fibrillation , with rapid rate , patient was started on cardizem drip  heart rate is still elevated dspite on 15 mg of Cardizem     Patient denies any chest pain ,  patient was able to sleep well last night after one week , feeling better           PAST MEDICAL & SURGICAL HISTORY:  HTN (hypertension)      HLD (hyperlipidemia)    nose surgery       Allergies    penicillins (Unknown)    Intolerances        SOCIAL HISTORY:   3-4 cigarettes daily, but plans to quit after today, etoh - 3-4 drinks on the weekend. However patient states just came back from a 12 day cruise and had about 10 drinks per day. Drugs - denies       FAMILY HISTORY:  FH: CAD (coronary artery disease)    FH: type 2 diabetes    FH: HTN (hypertension)     H/o CAD and kidney disease in maternal grandmother and maternal aunt     MEDICATIONS:Home Medications:  amLODIPine 10 mg oral tablet: 1 tab(s) orally (01 Apr 2024 18:20)  azithromycin 250 mg oral tablet: orally *** Take 2 tablets by mouth today, then take 1 tablet daily for 4 days *** (01 Apr 2024 18:20)  benzonatate 200 mg oral capsule: 1 cap(s) orally 3 times a day (01 Apr 2024 18:20)  doxycycline hyclate 100 mg oral tablet: 1 tab(s) orally 2 times a day (01 Apr 2024 18:20)  fexofenadine 180 mg oral tablet: 1 tab(s) orally once a day (01 Apr 2024 18:20)  losartan 50 mg oral tablet: 1 tab(s) orally once a day (01 Apr 2024 18:20)  methylPREDNISolone 4 mg oral tablet: orally *** take 6 tablets on day 1 and decrease by 1 tab each day for a total of 6 days*** (01 Apr 2024 18:20)  rosuvastatin 5 mg oral tablet: 1 tab(s) orally once a day (01 Apr 2024 16:59)    MEDICATIONS  (STANDING):  benzonatate 200 milliGRAM(s) Oral three times a day  cefepime  Injectable. 1000 milliGRAM(s) IV Push every 12 hours  dextrose 5%. 1000 milliLiter(s) (50 mL/Hr) IV Continuous <Continuous>  dextrose 5%. 1000 milliLiter(s) (100 mL/Hr) IV Continuous <Continuous>  dextrose 50% Injectable 25 Gram(s) IV Push once  dextrose 50% Injectable 12.5 Gram(s) IV Push once  dextrose 50% Injectable 25 Gram(s) IV Push once  diltiazem Infusion 15 mG/Hr (15 mL/Hr) IV Continuous <Continuous>  glucagon  Injectable 1 milliGRAM(s) IntraMuscular once  heparin  Infusion. 2000 Unit(s)/Hr (20 mL/Hr) IV Continuous <Continuous>  influenza   Vaccine 0.5 milliLiter(s) IntraMuscular once  insulin lispro (ADMELOG) corrective regimen sliding scale   SubCutaneous at bedtime  insulin lispro (ADMELOG) corrective regimen sliding scale   SubCutaneous three times a day before meals  metoprolol tartrate 25 milliGRAM(s) Oral two times a day  predniSONE   Tablet 30 milliGRAM(s) Oral daily  tiotropium 2.5 MICROgram(s) Inhaler 2 Puff(s) Inhalation daily  vancomycin  IVPB 2000 milliGRAM(s) IV Intermittent every 12 hours    MEDICATIONS  (PRN):  albuterol    90 MICROgram(s) HFA Inhaler 2 Puff(s) Inhalation every 6 hours PRN Bronchospasm  dextrose Oral Gel 15 Gram(s) Oral once PRN Blood Glucose LESS THAN 70 milliGRAM(s)/deciliter  heparin   Injectable 60472 Unit(s) IV Push every 6 hours PRN For aPTT less than 40  heparin   Injectable 5000 Unit(s) IV Push every 6 hours PRN For aPTT between 40 - 57      REVIEW OF SYSTEMS:    CONSTITUTIONAL: + weakness, fevers or chills  EYES/ENT: No visual changes;  No vertigo or throat pain   NECK: No pain or stiffness  RESPIRATORY: +cough, wheezing, hortness of breath  CARDIOVASCULAR: No chest pain or palpitations  GASTROINTESTINAL: No abdominal or epigastric pain. No nausea, vomiting, or hematemesis; No diarrhea or constipation. No melena or hematochezia.  GENITOURINARY: No dysuria, frequency or hematuria  NEUROLOGICAL: No numbness or weakness  SKIN: No itching, burning, rashes, or lesions   All other review of systems is negative unless indicated above    Vital Signs Last 24 Hrs  T(C): 37.3 (02 Apr 2024 04:44), Max: 38.3 (02 Apr 2024 01:46)  T(F): 99.1 (02 Apr 2024 04:44), Max: 100.9 (02 Apr 2024 01:46)  HR: 113 (02 Apr 2024 05:26) (75 - 155)  BP: 145/111 (02 Apr 2024 05:26) (109/85 - 145/111)  BP(mean): 95 (01 Apr 2024 20:13) (95 - 112)  RR: 22 (02 Apr 2024 04:44) (18 - 32)  SpO2: 94% (02 Apr 2024 04:44) (91% - 98%)    Parameters below as of 02 Apr 2024 04:44  Patient On (Oxygen Delivery Method): nasal cannula  O2 Flow (L/min): 3      I&O's Summary      PHYSICAL EXAM:    Constitutional: NAD, awake and alert, morbid obesity   HEENT: PERR, EOMI,  No oral cyananosis. short thick neck   Neck:  supple,  No JVD  Respiratory: Breath sounds are mild decreased bases ,. fine crackles bases   Cardiovascular: S1 and S2, regular rate and rhythm, no Murmurs, gallops or rubs  Gastrointestinal: Bowel Sounds present, soft, nontender.   Extremities: No peripheral edema. No clubbing or cyanosis.  Vascular: 2+ peripheral pulses  Neurological: A/O x 3, no focal deficits  Musculoskeletal: no calf tenderness.  Skin: No rashes.      LABS: All Labs Reviewed:                        14.5   11.79 )-----------( 249      ( 02 Apr 2024 04:58 )             42.3                         15.0   13.05 )-----------( 268      ( 01 Apr 2024 14:28 )             44.4     02 Apr 2024 04:58    139    |  109    |  17     ----------------------------<  150    3.8     |  25     |  1.18   01 Apr 2024 14:28    136    |  107    |  20     ----------------------------<  167    4.1     |  22     |  1.33     Ca    8.8        02 Apr 2024 04:58  Ca    8.4        01 Apr 2024 14:28  Mg     2.0       01 Apr 2024 14:28    TPro  6.7    /  Alb  3.2    /  TBili  1.0    /  DBili  x      /  AST  95     /  ALT  334    /  AlkPhos  79     02 Apr 2024 04:58  TPro  6.7    /  Alb  3.2    /  TBili  0.8    /  DBili  x      /  AST  113    /  ALT  339    /  AlkPhos  77     01 Apr 2024 14:28    PT/INR - ( 02 Apr 2024 04:58 )   PT: 14.8 sec;   INR: 1.32 ratio         PTT - ( 02 Apr 2024 04:58 )  PTT:144.5 sec    Pro-Brain Natriuretic Peptide: 2441 pg/mL (04.01.24 @ 14:28)     Blood Culture:     04-01 @ 14:28  TSH: 2.28    - TroponinI hsT: <-13.28, <-16.76  RADIOLOGY/EKG:  afib with rapid rate 142 BPM   !:44 Pm     4/2/24 afib with   prologned  qt          CHIEF COMPLAINT: shortness of breath for one week ,with cough not getting better     HPI:  39 y/o M w/ PMH of HTN, asthma, sleep apnea (non-adherent w/ CPAP), dyslipidemia, p/w cough. Patient states he has been coughing for approximately 1.5 week with shortness of breath activity  Cough is productive of brown sputum. Patient has been having intermittent fevers and chills. Patient has also been having associated dyspnea on exertion and palpitations. States he has been taking oral antibiotics and steroids outpatient without improvement ,from last monday .  Also feels like his abdomen is distended and c/o abdominal discomfort as well. Patient had 1 episode of vomiting on Thursday. Denies diarrhea / CP. , Patient was noted to be atrial fibrillation , with rapid rate , patient was started on cardizem drip  heart rate is still elevated dspite on 15 mg of Cardizem     Patient denies any chest pain ,  patient was able to sleep well last night after one week , feeling better           PAST MEDICAL & SURGICAL HISTORY:  HTN (hypertension)      HLD (hyperlipidemia)    nose surgery       Allergies    penicillins (Unknown)    Intolerances        SOCIAL HISTORY:   3-4 cigarettes daily, but plans to quit after today, etoh - 3-4 drinks on the weekend. However patient states just came back from a 12 day cruise and had about 10 drinks per day. Drugs - denies       FAMILY HISTORY:  FH: CAD (coronary artery disease)    FH: type 2 diabetes    FH: HTN (hypertension)     H/o CAD and kidney disease in maternal grandmother and maternal aunt     MEDICATIONS:Home Medications:  amLODIPine 10 mg oral tablet: 1 tab(s) orally (01 Apr 2024 18:20)  azithromycin 250 mg oral tablet: orally *** Take 2 tablets by mouth today, then take 1 tablet daily for 4 days *** (01 Apr 2024 18:20)  benzonatate 200 mg oral capsule: 1 cap(s) orally 3 times a day (01 Apr 2024 18:20)  doxycycline hyclate 100 mg oral tablet: 1 tab(s) orally 2 times a day (01 Apr 2024 18:20)  fexofenadine 180 mg oral tablet: 1 tab(s) orally once a day (01 Apr 2024 18:20)  losartan 50 mg oral tablet: 1 tab(s) orally once a day (01 Apr 2024 18:20)  methylPREDNISolone 4 mg oral tablet: orally *** take 6 tablets on day 1 and decrease by 1 tab each day for a total of 6 days*** (01 Apr 2024 18:20)  rosuvastatin 5 mg oral tablet: 1 tab(s) orally once a day (01 Apr 2024 16:59)    MEDICATIONS  (STANDING):  benzonatate 200 milliGRAM(s) Oral three times a day  cefepime  Injectable. 1000 milliGRAM(s) IV Push every 12 hours  dextrose 5%. 1000 milliLiter(s) (50 mL/Hr) IV Continuous <Continuous>  dextrose 5%. 1000 milliLiter(s) (100 mL/Hr) IV Continuous <Continuous>  dextrose 50% Injectable 25 Gram(s) IV Push once  dextrose 50% Injectable 12.5 Gram(s) IV Push once  dextrose 50% Injectable 25 Gram(s) IV Push once  diltiazem Infusion 15 mG/Hr (15 mL/Hr) IV Continuous <Continuous>  glucagon  Injectable 1 milliGRAM(s) IntraMuscular once  heparin  Infusion. 2000 Unit(s)/Hr (20 mL/Hr) IV Continuous <Continuous>  influenza   Vaccine 0.5 milliLiter(s) IntraMuscular once  insulin lispro (ADMELOG) corrective regimen sliding scale   SubCutaneous at bedtime  insulin lispro (ADMELOG) corrective regimen sliding scale   SubCutaneous three times a day before meals  metoprolol tartrate 25 milliGRAM(s) Oral two times a day  predniSONE   Tablet 30 milliGRAM(s) Oral daily  tiotropium 2.5 MICROgram(s) Inhaler 2 Puff(s) Inhalation daily  vancomycin  IVPB 2000 milliGRAM(s) IV Intermittent every 12 hours    MEDICATIONS  (PRN):  albuterol    90 MICROgram(s) HFA Inhaler 2 Puff(s) Inhalation every 6 hours PRN Bronchospasm  dextrose Oral Gel 15 Gram(s) Oral once PRN Blood Glucose LESS THAN 70 milliGRAM(s)/deciliter  heparin   Injectable 14404 Unit(s) IV Push every 6 hours PRN For aPTT less than 40  heparin   Injectable 5000 Unit(s) IV Push every 6 hours PRN For aPTT between 40 - 57      REVIEW OF SYSTEMS:    CONSTITUTIONAL: + weakness, fevers or chills  EYES/ENT: No visual changes;  No vertigo or throat pain   NECK: No pain or stiffness  RESPIRATORY: +cough, wheezing, hortness of breath  CARDIOVASCULAR: No chest pain or palpitations  GASTROINTESTINAL: No abdominal or epigastric pain. No nausea, vomiting, or hematemesis; No diarrhea or constipation. No melena or hematochezia.  GENITOURINARY: No dysuria, frequency or hematuria  NEUROLOGICAL: No numbness or weakness  SKIN: No itching, burning, rashes, or lesions   All other review of systems is negative unless indicated above    Vital Signs Last 24 Hrs  T(C): 37.3 (02 Apr 2024 04:44), Max: 38.3 (02 Apr 2024 01:46)  T(F): 99.1 (02 Apr 2024 04:44), Max: 100.9 (02 Apr 2024 01:46)  HR: 113 (02 Apr 2024 05:26) (75 - 155)  BP: 145/111 (02 Apr 2024 05:26) (109/85 - 145/111)  BP(mean): 95 (01 Apr 2024 20:13) (95 - 112)  RR: 22 (02 Apr 2024 04:44) (18 - 32)  SpO2: 94% (02 Apr 2024 04:44) (91% - 98%)    Parameters below as of 02 Apr 2024 04:44  Patient On (Oxygen Delivery Method): nasal cannula  O2 Flow (L/min): 3      I&O's Summary      PHYSICAL EXAM:    Constitutional: NAD, awake and alert, morbid obesity   HEENT: PERR, EOMI,  No oral cyananosis. short thick neck   Neck:  supple,  No JVD  Respiratory: Breath sounds are mild decreased bases ,. fine crackles bases   Cardiovascular: S1 and S2, regular rate and rhythm, no Murmurs, gallops or rubs  Gastrointestinal: Bowel Sounds present, soft, nontender.   Extremities: No peripheral edema. No clubbing or cyanosis.  Vascular: 2+ peripheral pulses  Neurological: A/O x 3, no focal deficits  Musculoskeletal: no calf tenderness.  Skin: No rashes.      LABS: All Labs Reviewed:                        14.5   11.79 )-----------( 249      ( 02 Apr 2024 04:58 )             42.3                         15.0   13.05 )-----------( 268      ( 01 Apr 2024 14:28 )             44.4     02 Apr 2024 04:58    139    |  109    |  17     ----------------------------<  150    3.8     |  25     |  1.18   01 Apr 2024 14:28    136    |  107    |  20     ----------------------------<  167    4.1     |  22     |  1.33     Ca    8.8        02 Apr 2024 04:58  Ca    8.4        01 Apr 2024 14:28  Mg     2.0       01 Apr 2024 14:28    TPro  6.7    /  Alb  3.2    /  TBili  1.0    /  DBili  x      /  AST  95     /  ALT  334    /  AlkPhos  79     02 Apr 2024 04:58  TPro  6.7    /  Alb  3.2    /  TBili  0.8    /  DBili  x      /  AST  113    /  ALT  339    /  AlkPhos  77     01 Apr 2024 14:28    PT/INR - ( 02 Apr 2024 04:58 )   PT: 14.8 sec;   INR: 1.32 ratio         PTT - ( 02 Apr 2024 04:58 )  PTT:144.5 sec    Pro-Brain Natriuretic Peptide: 2441 pg/mL (04.01.24 @ 14:28)     Blood Culture:     04-01 @ 14:28  TSH: 2.28    - TroponinI hsT: <-13.28, <-16.76  RADIOLOGY/EKG:  afib with rapid rate 142 BPM   !:44 Pm     4/2/24 afib with   prologned  qt       < from: TTE Echo Complete w/o Contrast w/ Doppler (04.01.24 @ 18:53) >     The left ventricle is dilated with moderate, diffuse hypokinesis;   estimated left ventricular ejection fraction is 35-40 %.   Moderate concentric left ventricular hypertrophy.   The left atrium is severely dilated.   The IVC is dilated with decreased respiratory variation.   Moderate mitral regurgitation.   Mild tricuspid valve regurgitation.   Mild pulmonary hypertension.     Note: Tachycardia is noted during the exam.     Signature     ----------------------------------------------------------------    < end of copied text >

## 2024-04-02 NOTE — CONSULT NOTE ADULT - ASSESSMENT
Imp:  Elevated LFTs could be 2/2 covid  [place]    REc:  Check RUQ sono  Check hep E, autoimmune markers

## 2024-04-02 NOTE — PROGRESS NOTE ADULT - ASSESSMENT
37 y/o M w/ PMH of HTN, asthma, sleep apnea (non-adherent w/ CPAP), dyslipidemia, morbid obesity admitted on 4/1/24 w cough. Patient states he has been coughing for approximately 1.5 week. Cough is productive of brown sputum. Patient has been having intermittent fevers and chills. Patient has also been having associated dyspnea on exertion and palpitations. States he has been taking oral antibiotics and steroids outpatient without improvement.  Also feels like his abdomen is distended and c/o abdominal discomfort as well. Patient had 1 episode of vomiting on Thursday. Denies diarrhea / CP.  Social Hx: Tobacco - 3-4 cigarettes daily, but plans to quit after today, etoh - 3-4 drinks on the weekend. However patient states just came back from a 12 day cruise and had about 10 drinks per day. Drugs - denies     New onset of A-fib w/ RVR  - Troponin negative x 1  -On Cardizem drip. Will need to monitor BP closely   - c/w metoprolol bid   - IV heparin --> plan to transition to Po eliquis   - Cardio consult - IV lasix x1 dose, c/w IV heparin , cardizem and digoxin   -  EP consult - c/w IV cardizem, take BB , c/w IV heparin switch to Eliquis 5 bid , o/p Dr. Cotter follow up     Acute systolic heart failure   - Echo: Moderate, diffuse hypokinesis, EF = 35-40%, moerate Mitral regurg, mild triscuspid valve regur, mild pulm HTN   -4/2 s/p IV lasix  , BNP 2400--> 1700   - cardiology team follows  - daily weights     Sepsis 2/2 Pneumonia (possible gram negative source)  COVID-19 viral syndrome and Parainfluenza   -CT Chest: Small area of consolidation in the inferior lingula   -Patient failed outpatient antibiotics, and is s/p Vanco / Cefepime in ED.  - c/w ceftriaxone and Azithromycin as per ID team   - F/u blood cx , Lactate = 1.9  -RVP + for parainfluenza and covid 19   -Pulse ox monitoring   -Will avoid aggressive hydration as patient's EF is 35-40%  - mucolytics,  IS     Asthma exacerbation and h/o sleep apnea  -Albuterol  prn  , qd  Spiriva   - s/p prednisone --> dexamethasone 6 mg given Covid Pna  -Pulse ox monitoring  -If improving, can f/u outpatient w/ Pulm     New Transaminitis - Possibl 2/2 Etoh use   -CT Abd: Liver within normal limits   -GI consult   -Hepatitis panel  -Etoh cessation   -Hold statin     *Suspect MARIANELA vs CKD?  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

## 2024-04-02 NOTE — CONSULT NOTE ADULT - SUBJECTIVE AND OBJECTIVE BOX
Patient is a 38y old  Male who presents with a chief complaint of cough (2024 07:46)    HPI:  39 y/o M w/ PMH of HTN, asthma, sleep apnea (non-adherent w/ CPAP), dyslipidemia, p/w cough. Patient states he has been coughing for approximately 1.5 week. Cough is productive of brown sputum. Patient has been having intermittent fevers and chills. Patient has also been having associated dyspnea on exertion and palpitations. States he has been taking oral antibiotics and steroids outpatient without improvement.  Also feels like his abdomen is distended and c/o abdominal discomfort as well. Patient had 1 episode of vomiting on Thursday. Denies diarrhea / CP.    (2024 20:03)      PMH: as above  PSH: nasal surgery   Meds: per reconciliation sheet, noted below  MEDICATIONS  (STANDING):  benzonatate 200 milliGRAM(s) Oral three times a day  cefepime  Injectable. 1000 milliGRAM(s) IV Push every 12 hours  dextrose 5%. 1000 milliLiter(s) (100 mL/Hr) IV Continuous <Continuous>  dextrose 5%. 1000 milliLiter(s) (50 mL/Hr) IV Continuous <Continuous>  dextrose 50% Injectable 25 Gram(s) IV Push once  dextrose 50% Injectable 12.5 Gram(s) IV Push once  dextrose 50% Injectable 25 Gram(s) IV Push once  digoxin  Injectable 250 MICROGram(s) IV Push every 4 hours  diltiazem Infusion 15 mG/Hr (15 mL/Hr) IV Continuous <Continuous>  glucagon  Injectable 1 milliGRAM(s) IntraMuscular once  heparin  Infusion. 2000 Unit(s)/Hr (20 mL/Hr) IV Continuous <Continuous>  influenza   Vaccine 0.5 milliLiter(s) IntraMuscular once  insulin lispro (ADMELOG) corrective regimen sliding scale   SubCutaneous at bedtime  insulin lispro (ADMELOG) corrective regimen sliding scale   SubCutaneous three times a day before meals  metoprolol tartrate 25 milliGRAM(s) Oral two times a day  predniSONE   Tablet 30 milliGRAM(s) Oral daily  tiotropium 2.5 MICROgram(s) Inhaler 2 Puff(s) Inhalation daily  vancomycin  IVPB 2000 milliGRAM(s) IV Intermittent every 12 hours    MEDICATIONS  (PRN):  albuterol    90 MICROgram(s) HFA Inhaler 2 Puff(s) Inhalation every 6 hours PRN Bronchospasm  dextrose Oral Gel 15 Gram(s) Oral once PRN Blood Glucose LESS THAN 70 milliGRAM(s)/deciliter  heparin   Injectable 5000 Unit(s) IV Push every 6 hours PRN For aPTT between 40 - 57  heparin   Injectable 02938 Unit(s) IV Push every 6 hours PRN For aPTT less than 40    Allergies    penicillins (Unknown)    Intolerances      Social Hx: Tobacco - 3-4 cigarettes daily, but plans to quit after today, etoh - 3-4 drinks on the weekend. However patient states just came back from a 12 day cruise and had about 10 drinks per day. Drugs - denies     FAMILY HISTORY:  FH: CAD (coronary artery disease)    FH: type 2 diabetes    FH: HTN (hypertension)    no history of premature cardiovascular disease in first degree relatives    ROS: the patient denies fever, no chills, no HA, no dizziness, no sore throat, no blurry vision, no CP, no palpitations, +ve SOB, +ve  cough, no abdominal pain, no diarrhea, no N/V, no dysuria, no leg pain, no claudication, no rash, no joint aches, no rectal pain or bleeding, no night sweats  All other systems reviewed and are negative    Vital Signs Last 24 Hrs  T(C): 37.6 (2024 08:06), Max: 38.3 (2024 01:46)  T(F): 99.7 (2024 08:06), Max: 100.9 (2024 01:46)  HR: 116 (2024 09:02) (75 - 155)  BP: 122/97 (2024 09:02) (109/85 - 151/110)  BP(mean): 122 (2024 08:06) (95 - 122)  RR: 18 (2024 08:06) (18 - 32)  SpO2: 94% (2024 08:06) (91% - 98%)    Parameters below as of 2024 08:06  Patient On (Oxygen Delivery Method): nasal cannula  O2 Flow (L/min): 3    Daily Height in cm: 172.72 (2024 13:45)    Daily Weight in k.9 (2024 21:05)    PE:    Constitutional: frail looking  HEENT: NC/AT, EOMI, PERRLA, conjunctivae clear; ears and nose atraumatic; pharynx benign  Neck: supple; thyroid not palpable  Back: no tenderness  Respiratory: respiratory effort normal; clear to auscultation  Cardiovascular: S1S2 regular, no murmurs  Abdomen: soft, not tender, not distended, positive BS; liver and spleen WNL  Genitourinary: no suprapubic tenderness  Lymphatic: no LN palpable  Musculoskeletal: no muscle tenderness, no joint swelling or tenderness  Extremities: no pedal edema  Neurological/ Psychiatric: AxOx3, Judgement and insight normal;  moving all extremities  Skin: no rashes; no palpable lesions    Labs: all available labs reviewed                        14.5   11.79 )-----------( 249      ( 2024 04:58 )             42.3     04-02    139  |  109<H>  |  17  ----------------------------<  150<H>  3.8   |  25  |  1.18    Ca    8.8      2024 04:58  Mg     2.0     04-01    TPro  6.7  /  Alb  3.2<L>  /  TBili  1.0  /  DBili  x   /  AST  95<H>  /  ALT  334<H>  /  AlkPhos  79  04-02     LIVER FUNCTIONS - ( 2024 04:58 )  Alb: 3.2 g/dL / Pro: 6.7 gm/dL / ALK PHOS: 79 U/L / ALT: 334 U/L / AST: 95 U/L / GGT: x           Urinalysis Basic - ( 2024 04:58 )    Color: x / Appearance: x / SG: x / pH: x  Gluc: 150 mg/dL / Ketone: x  / Bili: x / Urobili: x   Blood: x / Protein: x / Nitrite: x   Leuk Esterase: x / RBC: x / WBC x   Sq Epi: x / Non Sq Epi: x / Bacteria: x          Radiology: all available radiological tests reviewed    Advanced directives addressed: full resuscitation Patient is a 38y old  Male who presents with a chief complaint of cough (2024 07:46)    HPI:  37 y/o M w/ PMH of HTN, asthma, sleep apnea (non-adherent w/ CPAP), dyslipidemia, p/w cough. Patient states he has been coughing for approximately 1.5 week. Cough is productive of brown sputum. Patient has been having intermittent fevers and chills. Patient has also been having associated dyspnea on exertion and palpitations. States he has been taking oral antibiotics and steroids outpatient without improvement.  Also feels like his abdomen is distended and c/o abdominal discomfort as well. Patient had 1 episode of vomiting on Thursday. Denies diarrhea / CP.       PMH: as above  PSH: nasal surgery   Meds: per reconciliation sheet, noted below  MEDICATIONS  (STANDING):  benzonatate 200 milliGRAM(s) Oral three times a day  cefepime  Injectable. 1000 milliGRAM(s) IV Push every 12 hours  dextrose 5%. 1000 milliLiter(s) (100 mL/Hr) IV Continuous <Continuous>  dextrose 5%. 1000 milliLiter(s) (50 mL/Hr) IV Continuous <Continuous>  dextrose 50% Injectable 25 Gram(s) IV Push once  dextrose 50% Injectable 12.5 Gram(s) IV Push once  dextrose 50% Injectable 25 Gram(s) IV Push once  digoxin  Injectable 250 MICROGram(s) IV Push every 4 hours  diltiazem Infusion 15 mG/Hr (15 mL/Hr) IV Continuous <Continuous>  glucagon  Injectable 1 milliGRAM(s) IntraMuscular once  heparin  Infusion. 2000 Unit(s)/Hr (20 mL/Hr) IV Continuous <Continuous>  influenza   Vaccine 0.5 milliLiter(s) IntraMuscular once  insulin lispro (ADMELOG) corrective regimen sliding scale   SubCutaneous at bedtime  insulin lispro (ADMELOG) corrective regimen sliding scale   SubCutaneous three times a day before meals  metoprolol tartrate 25 milliGRAM(s) Oral two times a day  predniSONE   Tablet 30 milliGRAM(s) Oral daily  tiotropium 2.5 MICROgram(s) Inhaler 2 Puff(s) Inhalation daily  vancomycin  IVPB 2000 milliGRAM(s) IV Intermittent every 12 hours    MEDICATIONS  (PRN):  albuterol    90 MICROgram(s) HFA Inhaler 2 Puff(s) Inhalation every 6 hours PRN Bronchospasm  dextrose Oral Gel 15 Gram(s) Oral once PRN Blood Glucose LESS THAN 70 milliGRAM(s)/deciliter  heparin   Injectable 5000 Unit(s) IV Push every 6 hours PRN For aPTT between 40 - 57  heparin   Injectable 58554 Unit(s) IV Push every 6 hours PRN For aPTT less than 40    Allergies    penicillins (Unknown)    Intolerances      Social Hx: Tobacco - 3-4 cigarettes daily, but plans to quit after today, etoh - 3-4 drinks on the weekend. However patient states just came back from a 12 day cruise and had about 10 drinks per day. Drugs - denies     FAMILY HISTORY:  FH: CAD (coronary artery disease)    FH: type 2 diabetes    FH: HTN (hypertension)    no history of premature cardiovascular disease in first degree relatives    ROS: the patient denies fever, no chills, no HA, no dizziness, no sore throat, no blurry vision, no CP, no palpitations, +ve SOB, +ve  cough, no abdominal pain, no diarrhea, no N/V, no dysuria, no leg pain, no claudication, no rash, no joint aches, no rectal pain or bleeding, no night sweats  All other systems reviewed and are negative    Vital Signs Last 24 Hrs  T(C): 37.6 (2024 08:06), Max: 38.3 (2024 01:46)  T(F): 99.7 (2024 08:06), Max: 100.9 (2024 01:46)  HR: 116 (2024 09:02) (75 - 155)  BP: 122/97 (2024 09:02) (109/85 - 151/110)  BP(mean): 122 (2024 08:06) (95 - 122)  RR: 18 (2024 08:06) (18 - 32)  SpO2: 94% (2024 08:06) (91% - 98%)    Parameters below as of 2024 08:06  Patient On (Oxygen Delivery Method): nasal cannula  O2 Flow (L/min): 3    Daily Height in cm: 172.72 (2024 13:45)    Daily Weight in k.9 (2024 21:05)    PE:    Constitutional: frail looking  HEENT: NC/AT, EOMI, PERRLA, conjunctivae clear; ears and nose atraumatic; pharynx benign  Neck: supple; thyroid not palpable  Back: no tenderness  Respiratory: respiratory effort normal; clear to auscultation  Cardiovascular: S1S2 regular, no murmurs  Abdomen: soft, not tender, not distended, positive BS; liver and spleen WNL  Genitourinary: no suprapubic tenderness  Lymphatic: no LN palpable  Musculoskeletal: no muscle tenderness, no joint swelling or tenderness  Extremities: no pedal edema  Neurological/ Psychiatric: AxOx3, Judgement and insight normal;  moving all extremities  Skin: no rashes; no palpable lesions    Labs: all available labs reviewed                        14.5   11.79 )-----------( 249      ( 2024 04:58 )             42.3     04-02    139  |  109<H>  |  17  ----------------------------<  150<H>  3.8   |  25  |  1.18    Ca    8.8      2024 04:58  Mg     2.0     04-01    TPro  6.7  /  Alb  3.2<L>  /  TBili  1.0  /  DBili  x   /  AST  95<H>  /  ALT  334<H>  /  AlkPhos  79  04-02     LIVER FUNCTIONS - ( 2024 04:58 )  Alb: 3.2 g/dL / Pro: 6.7 gm/dL / ALK PHOS: 79 U/L / ALT: 334 U/L / AST: 95 U/L / GGT: x           Urinalysis Basic - ( 2024 04:58 )    Color: x / Appearance: x / SG: x / pH: x  Gluc: 150 mg/dL / Ketone: x  / Bili: x / Urobili: x   Blood: x / Protein: x / Nitrite: x   Leuk Esterase: x / RBC: x / WBC x   Sq Epi: x / Non Sq Epi: x / Bacteria: x          Radiology: all available radiological tests reviewed    Advanced directives addressed: full resuscitation Patient is a 38y old  Male who presents with a chief complaint of cough (2024 07:46)    HPI:  39 y/o M w/ PMH of HTN, asthma, sleep apnea (non-adherent w/ CPAP), dyslipidemia, p/w cough. Patient states he has been coughing for approximately 1.5 week. Cough is productive of brown sputum. Patient has been having intermittent fevers and chills. Patient has also been having associated dyspnea on exertion and palpitations. States he has been taking oral antibiotics and steroids outpatient without improvement.  Also feels like his abdomen is distended and c/o abdominal discomfort as well. Patient had 1 episode of vomiting on Thursday. Denies diarrhea / CP. Here noted with       PMH: as above  PSH: nasal surgery   Meds: per reconciliation sheet, noted below  MEDICATIONS  (STANDING):  benzonatate 200 milliGRAM(s) Oral three times a day  cefepime  Injectable. 1000 milliGRAM(s) IV Push every 12 hours  dextrose 5%. 1000 milliLiter(s) (100 mL/Hr) IV Continuous <Continuous>  dextrose 5%. 1000 milliLiter(s) (50 mL/Hr) IV Continuous <Continuous>  dextrose 50% Injectable 25 Gram(s) IV Push once  dextrose 50% Injectable 12.5 Gram(s) IV Push once  dextrose 50% Injectable 25 Gram(s) IV Push once  digoxin  Injectable 250 MICROGram(s) IV Push every 4 hours  diltiazem Infusion 15 mG/Hr (15 mL/Hr) IV Continuous <Continuous>  glucagon  Injectable 1 milliGRAM(s) IntraMuscular once  heparin  Infusion. 2000 Unit(s)/Hr (20 mL/Hr) IV Continuous <Continuous>  influenza   Vaccine 0.5 milliLiter(s) IntraMuscular once  insulin lispro (ADMELOG) corrective regimen sliding scale   SubCutaneous at bedtime  insulin lispro (ADMELOG) corrective regimen sliding scale   SubCutaneous three times a day before meals  metoprolol tartrate 25 milliGRAM(s) Oral two times a day  predniSONE   Tablet 30 milliGRAM(s) Oral daily  tiotropium 2.5 MICROgram(s) Inhaler 2 Puff(s) Inhalation daily  vancomycin  IVPB 2000 milliGRAM(s) IV Intermittent every 12 hours    MEDICATIONS  (PRN):  albuterol    90 MICROgram(s) HFA Inhaler 2 Puff(s) Inhalation every 6 hours PRN Bronchospasm  dextrose Oral Gel 15 Gram(s) Oral once PRN Blood Glucose LESS THAN 70 milliGRAM(s)/deciliter  heparin   Injectable 5000 Unit(s) IV Push every 6 hours PRN For aPTT between 40 - 57  heparin   Injectable 25124 Unit(s) IV Push every 6 hours PRN For aPTT less than 40    Allergies    penicillins (Unknown)    Intolerances      Social Hx: Tobacco - 3-4 cigarettes daily, but plans to quit after today, etoh - 3-4 drinks on the weekend. However patient states just came back from a 12 day cruise and had about 10 drinks per day. Drugs - denies     FAMILY HISTORY:  FH: CAD (coronary artery disease)    FH: type 2 diabetes    FH: HTN (hypertension)    no history of premature cardiovascular disease in first degree relatives    ROS: the patient denies fever, no chills, no HA, no dizziness, no sore throat, no blurry vision, no CP, no palpitations, +ve SOB, +ve  cough, no abdominal pain, no diarrhea, no N/V, no dysuria, no leg pain, no claudication, no rash, no joint aches, no rectal pain or bleeding, no night sweats  All other systems reviewed and are negative    Vital Signs Last 24 Hrs  T(C): 37.6 (2024 08:06), Max: 38.3 (2024 01:46)  T(F): 99.7 (2024 08:06), Max: 100.9 (2024 01:46)  HR: 116 (2024 09:02) (75 - 155)  BP: 122/97 (2024 09:02) (109/85 - 151/110)  BP(mean): 122 (2024 08:06) (95 - 122)  RR: 18 (2024 08:06) (18 - 32)  SpO2: 94% (2024 08:06) (91% - 98%)    Parameters below as of 2024 08:06  Patient On (Oxygen Delivery Method): nasal cannula  O2 Flow (L/min): 3    Daily Height in cm: 172.72 (2024 13:45)    Daily Weight in k.9 (2024 21:05)    PE:    Constitutional: frail looking  HEENT: NC/AT, EOMI, PERRLA, conjunctivae clear; ears and nose atraumatic; pharynx benign  Neck: supple; thyroid not palpable  Back: no tenderness  Respiratory: respiratory effort normal; clear to auscultation  Cardiovascular: S1S2 regular, no murmurs  Abdomen: soft, not tender, not distended, positive BS; liver and spleen WNL  Genitourinary: no suprapubic tenderness  Lymphatic: no LN palpable  Musculoskeletal: no muscle tenderness, no joint swelling or tenderness  Extremities: no pedal edema  Neurological/ Psychiatric: AxOx3, Judgement and insight normal;  moving all extremities  Skin: no rashes; no palpable lesions    Labs: all available labs reviewed                        14.5   11.79 )-----------( 249      ( 2024 04:58 )             42.3     04-02    139  |  109<H>  |  17  ----------------------------<  150<H>  3.8   |  25  |  1.18    Ca    8.8      2024 04:58  Mg     2.0     04-01    TPro  6.7  /  Alb  3.2<L>  /  TBili  1.0  /  DBili  x   /  AST  95<H>  /  ALT  334<H>  /  AlkPhos  79  04-02     LIVER FUNCTIONS - ( 2024 04:58 )  Alb: 3.2 g/dL / Pro: 6.7 gm/dL / ALK PHOS: 79 U/L / ALT: 334 U/L / AST: 95 U/L / GGT: x           Urinalysis Basic - ( 2024 04:58 )    Color: x / Appearance: x / SG: x / pH: x  Gluc: 150 mg/dL / Ketone: x  / Bili: x / Urobili: x   Blood: x / Protein: x / Nitrite: x   Leuk Esterase: x / RBC: x / WBC x   Sq Epi: x / Non Sq Epi: x / Bacteria: x          Radiology: all available radiological tests reviewed    Advanced directives addressed: full resuscitation Patient is a 38y old  Male who presents with a chief complaint of cough (2024 07:46)    HPI:  37 y/o M w/ PMH of HTN, asthma, sleep apnea (non-adherent w/ CPAP), dyslipidemia, p/w cough. Patient states he has been coughing for approximately 1.5 week. Cough is productive of brown sputum. Patient has been having intermittent fevers and chills. Patient has also been having associated dyspnea on exertion and palpitations. States he has been taking oral antibiotics and steroids outpatient without improvement.  Also feels like his abdomen is distended and c/o abdominal discomfort as well. Patient had 1 episode of vomiting on Thursday. Denies diarrhea / CP. Here noted with lingular consolidation on imaging RVP positive for covid19, parainfluenza. Pt was given IV abx, steroids. Noted to have [place]. Has elevated LFTs.       PMH: as above  PSH: nasal surgery   Meds: per reconciliation sheet, noted below  MEDICATIONS  (STANDING):  benzonatate 200 milliGRAM(s) Oral three times a day  cefepime  Injectable. 1000 milliGRAM(s) IV Push every 12 hours  dextrose 5%. 1000 milliLiter(s) (100 mL/Hr) IV Continuous <Continuous>  dextrose 5%. 1000 milliLiter(s) (50 mL/Hr) IV Continuous <Continuous>  dextrose 50% Injectable 25 Gram(s) IV Push once  dextrose 50% Injectable 12.5 Gram(s) IV Push once  dextrose 50% Injectable 25 Gram(s) IV Push once  digoxin  Injectable 250 MICROGram(s) IV Push every 4 hours  diltiazem Infusion 15 mG/Hr (15 mL/Hr) IV Continuous <Continuous>  glucagon  Injectable 1 milliGRAM(s) IntraMuscular once  heparin  Infusion. 2000 Unit(s)/Hr (20 mL/Hr) IV Continuous <Continuous>  influenza   Vaccine 0.5 milliLiter(s) IntraMuscular once  insulin lispro (ADMELOG) corrective regimen sliding scale   SubCutaneous at bedtime  insulin lispro (ADMELOG) corrective regimen sliding scale   SubCutaneous three times a day before meals  metoprolol tartrate 25 milliGRAM(s) Oral two times a day  predniSONE   Tablet 30 milliGRAM(s) Oral daily  tiotropium 2.5 MICROgram(s) Inhaler 2 Puff(s) Inhalation daily  vancomycin  IVPB 2000 milliGRAM(s) IV Intermittent every 12 hours    MEDICATIONS  (PRN):  albuterol    90 MICROgram(s) HFA Inhaler 2 Puff(s) Inhalation every 6 hours PRN Bronchospasm  dextrose Oral Gel 15 Gram(s) Oral once PRN Blood Glucose LESS THAN 70 milliGRAM(s)/deciliter  heparin   Injectable 5000 Unit(s) IV Push every 6 hours PRN For aPTT between 40 - 57  heparin   Injectable 31116 Unit(s) IV Push every 6 hours PRN For aPTT less than 40    Allergies    penicillins (Unknown)    Intolerances      Social Hx: Tobacco - 3-4 cigarettes daily, but plans to quit after today, etoh - 3-4 drinks on the weekend. However patient states just came back from a 12 day cruise and had about 10 drinks per day. Drugs - denies     FAMILY HISTORY:  FH: CAD (coronary artery disease)    FH: type 2 diabetes    FH: HTN (hypertension)    no history of premature cardiovascular disease in first degree relatives    ROS: +fever, no chills, no HA, no dizziness, no sore throat, no blurry vision, no CP, no palpitations, +ve SOB, +ve  cough, no abdominal pain, no diarrhea, no N/V, no dysuria, no leg pain, no claudication, no rash, no joint aches, no rectal pain or bleeding, no night sweats  All other systems reviewed and are negative    Vital Signs Last 24 Hrs  T(C): 37.6 (2024 08:06), Max: 38.3 (2024 01:46)  T(F): 99.7 (2024 08:06), Max: 100.9 (2024 01:46)  HR: 116 (2024 09:02) (75 - 155)  BP: 122/97 (2024 09:02) (109/85 - 151/110)  BP(mean): 122 (2024 08:06) (95 - 122)  RR: 18 (2024 08:06) (18 - 32)  SpO2: 94% (2024 08:06) (91% - 98%)    Parameters below as of 2024 08:06  Patient On (Oxygen Delivery Method): nasal cannula  O2 Flow (L/min): 3    Daily Height in cm: 172.72 (2024 13:45)    Daily Weight in k.9 (2024 21:05)    PE:    Constitutional: NAD  HEENT: NC/AT, EOMI, PERRLA, conjunctivae clear; ears and nose atraumatic; pharynx benign  Neck: supple; thyroid not palpable  Back: no tenderness  Respiratory: decreased breath sounds  Cardiovascular: S1S2 regular, no murmurs  Abdomen: soft, not tender, not distended, positive BS; liver and spleen WNL  Genitourinary: no suprapubic tenderness  Lymphatic: no LN palpable  Musculoskeletal: no muscle tenderness, no joint swelling or tenderness  Extremities: no pedal edema  Neurological/ Psychiatric: AxOx3, Judgement and insight normal;  moving all extremities  Skin: no rashes; no palpable lesions    Labs: all available labs reviewed                        14.5   1179 )-----------( 249      ( 2024 04:58 )             42.3     04-02    139  |  109<H>  |  17  ----------------------------<  150<H>  3.8   |  25  |  1.18    Ca    8.8      2024 04:58  Mg     2.0     04-01    TPro  6.7  /  Alb  3.2<L>  /  TBili  1.0  /  DBili  x   /  AST  95<H>  /  ALT  334<H>  /  AlkPhos  79  04-02     LIVER FUNCTIONS - ( 2024 04:58 )  Alb: 3.2 g/dL / Pro: 6.7 gm/dL / ALK PHOS: 79 U/L / ALT: 334 U/L / AST: 95 U/L / GGT: x           Urinalysis Basic - ( 2024 04:58 )    Color: x / Appearance: x / SG: x / pH: x  Gluc: 150 mg/dL / Ketone: x  / Bili: x / Urobili: x   Blood: x / Protein: x / Nitrite: x   Leuk Esterase: x / RBC: x / WBC x   Sq Epi: x / Non Sq Epi: x / Bacteria: x          Radiology: all available radiological tests reviewed    Advanced directives addressed: full resuscitation

## 2024-04-03 ENCOUNTER — TRANSCRIPTION ENCOUNTER (OUTPATIENT)
Age: 39
End: 2024-04-03

## 2024-04-03 LAB
24R-OH-CALCIDIOL SERPL-MCNC: 28.7 NG/ML — LOW (ref 30–80)
ALBUMIN SERPL ELPH-MCNC: 3 G/DL — LOW (ref 3.3–5)
ALP SERPL-CCNC: 76 U/L — SIGNIFICANT CHANGE UP (ref 40–120)
ALT FLD-CCNC: 259 U/L — HIGH (ref 12–78)
ANION GAP SERPL CALC-SCNC: 6 MMOL/L — SIGNIFICANT CHANGE UP (ref 5–17)
ANION GAP SERPL CALC-SCNC: 7 MMOL/L — SIGNIFICANT CHANGE UP (ref 5–17)
APTT BLD: 84.9 SEC — HIGH (ref 24.5–35.6)
AST SERPL-CCNC: 44 U/L — HIGH (ref 15–37)
BASOPHILS # BLD AUTO: 0.02 K/UL — SIGNIFICANT CHANGE UP (ref 0–0.2)
BASOPHILS NFR BLD AUTO: 0.2 % — SIGNIFICANT CHANGE UP (ref 0–2)
BILIRUB DIRECT SERPL-MCNC: 0.2 MG/DL — SIGNIFICANT CHANGE UP (ref 0–0.3)
BILIRUB INDIRECT FLD-MCNC: 0.6 MG/DL — SIGNIFICANT CHANGE UP (ref 0.2–1)
BILIRUB SERPL-MCNC: 0.8 MG/DL — SIGNIFICANT CHANGE UP (ref 0.2–1.2)
BUN SERPL-MCNC: 21 MG/DL — SIGNIFICANT CHANGE UP (ref 7–23)
BUN SERPL-MCNC: 22 MG/DL — SIGNIFICANT CHANGE UP (ref 7–23)
CALCIUM SERPL-MCNC: 8.9 MG/DL — SIGNIFICANT CHANGE UP (ref 8.5–10.1)
CALCIUM SERPL-MCNC: 9.1 MG/DL — SIGNIFICANT CHANGE UP (ref 8.5–10.1)
CERULOPLASMIN SERPL-MCNC: 30 MG/DL — SIGNIFICANT CHANGE UP (ref 15–30)
CHLORIDE SERPL-SCNC: 110 MMOL/L — HIGH (ref 96–108)
CHLORIDE SERPL-SCNC: 110 MMOL/L — HIGH (ref 96–108)
CO2 SERPL-SCNC: 24 MMOL/L — SIGNIFICANT CHANGE UP (ref 22–31)
CO2 SERPL-SCNC: 24 MMOL/L — SIGNIFICANT CHANGE UP (ref 22–31)
CREAT SERPL-MCNC: 1.16 MG/DL — SIGNIFICANT CHANGE UP (ref 0.5–1.3)
CREAT SERPL-MCNC: 1.17 MG/DL — SIGNIFICANT CHANGE UP (ref 0.5–1.3)
CRP SERPL-MCNC: 119 MG/L — HIGH
CRP SERPL-MCNC: 50 MG/L — HIGH
D DIMER BLD IA.RAPID-MCNC: 183 NG/ML DDU — SIGNIFICANT CHANGE UP
EGFR: 83 ML/MIN/1.73M2 — SIGNIFICANT CHANGE UP
EGFR: 84 ML/MIN/1.73M2 — SIGNIFICANT CHANGE UP
EOSINOPHIL # BLD AUTO: 0 K/UL — SIGNIFICANT CHANGE UP (ref 0–0.5)
EOSINOPHIL NFR BLD AUTO: 0 % — SIGNIFICANT CHANGE UP (ref 0–6)
GLUCOSE BLDC GLUCOMTR-MCNC: 125 MG/DL — HIGH (ref 70–99)
GLUCOSE BLDC GLUCOMTR-MCNC: 125 MG/DL — HIGH (ref 70–99)
GLUCOSE BLDC GLUCOMTR-MCNC: 132 MG/DL — HIGH (ref 70–99)
GLUCOSE BLDC GLUCOMTR-MCNC: 149 MG/DL — HIGH (ref 70–99)
GLUCOSE BLDC GLUCOMTR-MCNC: 153 MG/DL — HIGH (ref 70–99)
GLUCOSE SERPL-MCNC: 147 MG/DL — HIGH (ref 70–99)
GLUCOSE SERPL-MCNC: 150 MG/DL — HIGH (ref 70–99)
HAV IGM SER-ACNC: SIGNIFICANT CHANGE UP
HBV CORE IGM SER-ACNC: SIGNIFICANT CHANGE UP
HBV SURFACE AG SER-ACNC: SIGNIFICANT CHANGE UP
HCT VFR BLD CALC: 42.5 % — SIGNIFICANT CHANGE UP (ref 39–50)
HCV AB S/CO SERPL IA: 0.06 S/CO — SIGNIFICANT CHANGE UP (ref 0–0.99)
HCV AB SERPL-IMP: SIGNIFICANT CHANGE UP
HGB BLD-MCNC: 14.4 G/DL — SIGNIFICANT CHANGE UP (ref 13–17)
IMM GRANULOCYTES NFR BLD AUTO: 0.8 % — SIGNIFICANT CHANGE UP (ref 0–0.9)
LYMPHOCYTES # BLD AUTO: 1.33 K/UL — SIGNIFICANT CHANGE UP (ref 1–3.3)
LYMPHOCYTES # BLD AUTO: 12.5 % — LOW (ref 13–44)
MAGNESIUM SERPL-MCNC: 2.6 MG/DL — SIGNIFICANT CHANGE UP (ref 1.6–2.6)
MAGNESIUM SERPL-MCNC: 2.6 MG/DL — SIGNIFICANT CHANGE UP (ref 1.6–2.6)
MCHC RBC-ENTMCNC: 30.6 PG — SIGNIFICANT CHANGE UP (ref 27–34)
MCHC RBC-ENTMCNC: 33.9 GM/DL — SIGNIFICANT CHANGE UP (ref 32–36)
MCV RBC AUTO: 90.2 FL — SIGNIFICANT CHANGE UP (ref 80–100)
MONOCYTES # BLD AUTO: 0.9 K/UL — SIGNIFICANT CHANGE UP (ref 0–0.9)
MONOCYTES NFR BLD AUTO: 8.5 % — SIGNIFICANT CHANGE UP (ref 2–14)
NEUTROPHILS # BLD AUTO: 8.27 K/UL — HIGH (ref 1.8–7.4)
NEUTROPHILS NFR BLD AUTO: 78 % — HIGH (ref 43–77)
NT-PROBNP SERPL-SCNC: 1506 PG/ML — HIGH (ref 0–125)
PHOSPHATE SERPL-MCNC: 3.6 MG/DL — SIGNIFICANT CHANGE UP (ref 2.5–4.5)
PHOSPHATE SERPL-MCNC: 3.7 MG/DL — SIGNIFICANT CHANGE UP (ref 2.5–4.5)
PLATELET # BLD AUTO: 257 K/UL — SIGNIFICANT CHANGE UP (ref 150–400)
POTASSIUM SERPL-MCNC: 4.1 MMOL/L — SIGNIFICANT CHANGE UP (ref 3.5–5.3)
POTASSIUM SERPL-MCNC: 4.1 MMOL/L — SIGNIFICANT CHANGE UP (ref 3.5–5.3)
POTASSIUM SERPL-SCNC: 4.1 MMOL/L — SIGNIFICANT CHANGE UP (ref 3.5–5.3)
POTASSIUM SERPL-SCNC: 4.1 MMOL/L — SIGNIFICANT CHANGE UP (ref 3.5–5.3)
PROCALCITONIN SERPL-MCNC: 0.17 NG/ML — HIGH (ref 0.02–0.1)
PROT SERPL-MCNC: 7.1 GM/DL — SIGNIFICANT CHANGE UP (ref 6–8.3)
RBC # BLD: 4.71 M/UL — SIGNIFICANT CHANGE UP (ref 4.2–5.8)
RBC # FLD: 14.8 % — HIGH (ref 10.3–14.5)
SODIUM SERPL-SCNC: 140 MMOL/L — SIGNIFICANT CHANGE UP (ref 135–145)
SODIUM SERPL-SCNC: 141 MMOL/L — SIGNIFICANT CHANGE UP (ref 135–145)
TROPONIN I, HIGH SENSITIVITY RESULT: 11.32 NG/L — SIGNIFICANT CHANGE UP
WBC # BLD: 10.6 K/UL — HIGH (ref 3.8–10.5)
WBC # FLD AUTO: 10.6 K/UL — HIGH (ref 3.8–10.5)

## 2024-04-03 PROCEDURE — 93010 ELECTROCARDIOGRAM REPORT: CPT

## 2024-04-03 PROCEDURE — 99233 SBSQ HOSP IP/OBS HIGH 50: CPT

## 2024-04-03 PROCEDURE — 99233 SBSQ HOSP IP/OBS HIGH 50: CPT | Mod: 25

## 2024-04-03 PROCEDURE — 93312 ECHO TRANSESOPHAGEAL: CPT | Mod: 26

## 2024-04-03 PROCEDURE — 93325 DOPPLER ECHO COLOR FLOW MAPG: CPT | Mod: 26

## 2024-04-03 PROCEDURE — 93320 DOPPLER ECHO COMPLETE: CPT | Mod: 26

## 2024-04-03 RX ORDER — APIXABAN 2.5 MG/1
5 TABLET, FILM COATED ORAL EVERY 12 HOURS
Refills: 0 | Status: DISCONTINUED | OUTPATIENT
Start: 2024-04-03 | End: 2024-04-04

## 2024-04-03 RX ORDER — ACETAMINOPHEN 500 MG
650 TABLET ORAL ONCE
Refills: 0 | Status: COMPLETED | OUTPATIENT
Start: 2024-04-03 | End: 2024-04-03

## 2024-04-03 RX ORDER — DILTIAZEM HCL 120 MG
30 CAPSULE, EXT RELEASE 24 HR ORAL EVERY 8 HOURS
Refills: 0 | Status: DISCONTINUED | OUTPATIENT
Start: 2024-04-03 | End: 2024-04-04

## 2024-04-03 RX ORDER — CHLORHEXIDINE GLUCONATE 213 G/1000ML
1 SOLUTION TOPICAL
Refills: 0 | Status: DISCONTINUED | OUTPATIENT
Start: 2024-04-03 | End: 2024-04-04

## 2024-04-03 RX ADMIN — HEPARIN SODIUM 2300 UNIT(S)/HR: 5000 INJECTION INTRAVENOUS; SUBCUTANEOUS at 03:13

## 2024-04-03 RX ADMIN — Medication 200 MILLIGRAM(S): at 22:01

## 2024-04-03 RX ADMIN — APIXABAN 5 MILLIGRAM(S): 2.5 TABLET, FILM COATED ORAL at 21:59

## 2024-04-03 RX ADMIN — Medication 30 MILLIGRAM(S): at 21:59

## 2024-04-03 RX ADMIN — Medication 1 TABLET(S): at 09:04

## 2024-04-03 RX ADMIN — Medication 200 MILLIGRAM(S): at 16:54

## 2024-04-03 RX ADMIN — Medication 5 MG/HR: at 00:08

## 2024-04-03 RX ADMIN — Medication 1 TABLET(S): at 16:54

## 2024-04-03 RX ADMIN — Medication 25 MILLIGRAM(S): at 21:59

## 2024-04-03 RX ADMIN — TIOTROPIUM BROMIDE 2 PUFF(S): 18 CAPSULE ORAL; RESPIRATORY (INHALATION) at 09:03

## 2024-04-03 RX ADMIN — Medication 25 MILLIGRAM(S): at 09:04

## 2024-04-03 RX ADMIN — HEPARIN SODIUM 2300 UNIT(S)/HR: 5000 INJECTION INTRAVENOUS; SUBCUTANEOUS at 10:47

## 2024-04-03 RX ADMIN — Medication 650 MILLIGRAM(S): at 02:48

## 2024-04-03 RX ADMIN — HEPARIN SODIUM 2300 UNIT(S)/HR: 5000 INJECTION INTRAVENOUS; SUBCUTANEOUS at 07:38

## 2024-04-03 RX ADMIN — AZITHROMYCIN 500 MILLIGRAM(S): 500 TABLET, FILM COATED ORAL at 12:27

## 2024-04-03 RX ADMIN — Medication 30 MILLIGRAM(S): at 13:54

## 2024-04-03 RX ADMIN — Medication 6 MILLIGRAM(S): at 10:46

## 2024-04-03 RX ADMIN — Medication 200 MILLIGRAM(S): at 21:59

## 2024-04-03 RX ADMIN — Medication 650 MILLIGRAM(S): at 03:54

## 2024-04-03 RX ADMIN — APIXABAN 5 MILLIGRAM(S): 2.5 TABLET, FILM COATED ORAL at 12:28

## 2024-04-03 RX ADMIN — Medication 200 MILLIGRAM(S): at 06:29

## 2024-04-03 RX ADMIN — Medication 1: at 16:56

## 2024-04-03 RX ADMIN — CEFTRIAXONE 2000 MILLIGRAM(S): 500 INJECTION, POWDER, FOR SOLUTION INTRAMUSCULAR; INTRAVENOUS at 22:01

## 2024-04-03 NOTE — CHART NOTE - NSCHARTNOTEFT_GEN_A_CORE
Patient noted to be bradycardic to the 20's while sleeping. Patient also noted to have a 4.5 and 3.5 second pause. Patient asymptomatic and sleeping during episodes. Patient noted to be snoring when he sleeps. Upon waking patient up, patient reports anxiety and mild chest discomfort. denies other acute complaints.     T(C): 36.7 (04-03-24 @ 05:10), Max: 37.6 (04-02-24 @ 08:06)  HR: 66 (04-03-24 @ 05:30) (66 - 126)  BP: 148/105 (04-03-24 @ 05:30) (122/97 - 151/110)  RR: 18 (04-03-24 @ 05:30) (18 - 18)  SpO2: 95% (04-03-24 @ 05:30) (92% - 95%)    CONSTITUTIONAL: Well groomed, no apparent distress  RESP: No respiratory distress, no use of accessory muscles;   CV: irregularly irregular,   GI: Soft, NT, ND, no rebound, no guarding; no palpable masses; no hepatosplenomegaly; no hernia palpated      Plan  Bradycardia   - Hold AM dose of Cardizem   - BMP, mag, phos   - Continue telemetry   - EP and Cardiology following Patient noted to be bradycardic to the 20's while sleeping. Patient also noted to have a 4.5 and 3.5 second pause. Patient has been off of cardizem drip for a few hours. Patient asymptomatic and sleeping during episodes. Patient noted to be snoring when he sleeps. Upon waking patient up, patient reports anxiety and mild chest discomfort. denies other acute complaints.     T(C): 36.7 (04-03-24 @ 05:10), Max: 37.6 (04-02-24 @ 08:06)  HR: 66 (04-03-24 @ 05:30) (66 - 126)  BP: 148/105 (04-03-24 @ 05:30) (122/97 - 151/110)  RR: 18 (04-03-24 @ 05:30) (18 - 18)  SpO2: 95% (04-03-24 @ 05:30) (92% - 95%)    CONSTITUTIONAL: Well groomed, no apparent distress  RESP: No respiratory distress, no use of accessory muscles;   CV: irregularly irregular,   GI: Soft, NT, ND, no rebound, no guarding; no palpable masses; no hepatosplenomegaly; no hernia palpated      Plan  Bradycardia   - Hold AM dose of Cardizem   - BMP, mag, phos   - Continue telemetry   - EP and Cardiology following

## 2024-04-03 NOTE — DISCHARGE NOTE NURSING/CASE MANAGEMENT/SOCIAL WORK - NSDCPEWEB_GEN_ALL_CORE
[facility] for Tobacco Control website --- [email or web address] --- [email or web address]

## 2024-04-03 NOTE — DISCHARGE NOTE NURSING/CASE MANAGEMENT/SOCIAL WORK - PATIENT PORTAL LINK FT
You can access the FollowMyHealth Patient Portal offered by [facility] by registering at the following website: [email or web address]. By joining [facility]’s FollowMyHealth portal, you will also be able to view your health information using other applications (apps) compatible with our system.

## 2024-04-03 NOTE — CHART NOTE - NSCHARTNOTEFT_GEN_A_CORE
Patients heart rate continuously coming down, now dipping into the 40's. Earlier in the night, patient was on a Cardizem drip at 15mg/hr however it was brought down to 5mg/hr due to bradycardia. On the 5 mg/hr, patient had a 2 second pause and HR is dipping into the 40's. Patient asymptomatic.     Plan  1. A.fib  - D/c cardizem drip   - Order cardizem 30mg q8 hours  - Continue Metoprolol 25mg BID  - Monitor BP   - Cardiology following   - Cardioversion pending

## 2024-04-03 NOTE — PROGRESS NOTE ADULT - SUBJECTIVE AND OBJECTIVE BOX
38y Male admitted for SOB and irregular heart beat.  Pt said it started last Thursday. He was started on Diltiazem Drip and his ventricular heart rate continues to reach 130-140s.  Dr. Tillman added on Metoprolol  He was not on any AV libby blockers at home  Found to have +COVID PNA and +Parainfluenza    4/2/24:  pt received 1 of 2 doses Dig IV, HRs are better than earlier this morning.  Tmax 100.9 overnight  TELE: atrial fib 105-120 bpm    4/3/24:  pt seen resting in bed, he is feeling better today, afebrile, still has cough, sputum is clear, sometimes pink tinged per pt.  Events of last night noted with transient episodes of bradycardia and pauses.  Pt denies any symptoms and said he slept well.  Cardizem gtt discontinued.  TELE: Atrial fib rate currently 100-110 bpm,  episodes of RVRs this morning      MEDICATIONS  (STANDING):  azithromycin   Tablet 500 milliGRAM(s) Oral every 24 hours  benzonatate 200 milliGRAM(s) Oral three times a day  cefTRIAXone Injectable. 2000 milliGRAM(s) IV Push every 24 hours  chlorhexidine 4% Liquid 1 Application(s) Topical <User Schedule>  dexAMETHasone     Tablet 6 milliGRAM(s) Oral daily  dextrose 5%. 1000 milliLiter(s) (50 mL/Hr) IV Continuous <Continuous>  dextrose 5%. 1000 milliLiter(s) (100 mL/Hr) IV Continuous <Continuous>  dextrose 50% Injectable 12.5 Gram(s) IV Push once  dextrose 50% Injectable 25 Gram(s) IV Push once  dextrose 50% Injectable 25 Gram(s) IV Push once  diltiazem    Tablet 30 milliGRAM(s) Oral every 8 hours  glucagon  Injectable 1 milliGRAM(s) IntraMuscular once  guaiFENesin Oral Liquid (Sugar-Free) 200 milliGRAM(s) Oral every 8 hours  heparin  Infusion. 2000 Unit(s)/Hr (20 mL/Hr) IV Continuous <Continuous>  influenza   Vaccine 0.5 milliLiter(s) IntraMuscular once  insulin lispro (ADMELOG) corrective regimen sliding scale   SubCutaneous at bedtime  insulin lispro (ADMELOG) corrective regimen sliding scale   SubCutaneous three times a day before meals  lactobacillus acidophilus 1 Tablet(s) Oral three times a day with meals  metoprolol tartrate 25 milliGRAM(s) Oral two times a day  tiotropium 2.5 MICROgram(s) Inhaler 2 Puff(s) Inhalation daily    MEDICATIONS  (PRN):  albuterol    90 MICROgram(s) HFA Inhaler 2 Puff(s) Inhalation every 6 hours PRN Bronchospasm  dextrose Oral Gel 15 Gram(s) Oral once PRN Blood Glucose LESS THAN 70 milliGRAM(s)/deciliter  heparin   Injectable 5000 Unit(s) IV Push every 6 hours PRN For aPTT between 40 - 57  heparin   Injectable 15399 Unit(s) IV Push every 6 hours PRN For aPTT less than 40    Allergies    penicillins (Unknown)    Intolerances    REVIEW OF SYSTEMS:    CONSTITUTIONAL: No weakness, fevers or chills  EYES/ENT: No visual changes;  No vertigo or throat pain   NECK: No pain or stiffness  RESPIRATORY: + cough  CARDIOVASCULAR: No chest pain or palpitations  GASTROINTESTINAL: No abdominal or epigastric pain. No nausea, vomiting, or hematemesis; No diarrhea or constipation. No melena or hematochezia.  GENITOURINARY: No dysuria, frequency or hematuria  NEUROLOGICAL: No numbness or weakness  SKIN: No itching, burning, rashes, or lesions   All other review of systems is negative unless indicated above      Vital Signs Last 24 Hrs  T(C): 36.5 (03 Apr 2024 08:54), Max: 36.9 (02 Apr 2024 20:40)  T(F): 97.7 (03 Apr 2024 08:54), Max: 98.4 (02 Apr 2024 20:40)  HR: 104 (03 Apr 2024 08:54) (66 - 120)  BP: 155/117 (03 Apr 2024 08:54) (125/93 - 155/117)  BP(mean): --  RR: 18 (03 Apr 2024 08:54) (18 - 18)  SpO2: 96% (03 Apr 2024 08:54) (92% - 96%)    Parameters below as of 03 Apr 2024 08:54  Patient On (Oxygen Delivery Method): room air                          14.4   10.60 )-----------( 257      ( 03 Apr 2024 06:09 )             42.5     04-03    141  |  110<H>  |  22  ----------------------------<  147<H>  4.1   |  24  |  1.17    Ca    8.9      03 Apr 2024 06:09  Phos  3.7     04-03  Mg     2.6     04-03    TPro  7.1  /  Alb  3.0<L>  /  TBili  0.8  /  DBili  0.2  /  AST  44<H>  /  ALT  259<H>  /  AlkPhos  76  04-03      PHYSICAL EXAM:    General: WN/WD NAD  Neurology: A&Ox3, nonfocal, SUAREZ x 4  HEENT: NC/AT, neck supple, no JVD, EOMI, PERRL  Respiratory: CTA B/L  CV: S1S2 irregular, no murmurs, rubs or gallops  Abdominal: Soft, NT, ND +BS  Extremities: No edema, + peripheral pulses  Skin: No rashes      < from: TTE Echo Complete w/o Contrast w/ Doppler (04.01.24 @ 18:53) >   Impression     Summary     The left ventricle is dilated with moderate, diffuse hypokinesis;   estimated left ventricular ejection fraction is 35-40 %.   Moderate concentric left ventricular hypertrophy.   The left atrium is severely dilated.   The IVC is dilated with decreased respiratory variation.   Moderate mitral regurgitation.   Mild tricuspid valve regurgitation.   Mild pulmonary hypertension.     Note: Tachycardia is noted during the exam.      < from: CT Angio Chest PE Protocol w/ IV Cont (04.01.24 @ 15:35) >  IMPRESSION:  Small area of consolidation in the inferior lingula may represent   pneumonia versus atelectasis.  No evidence of pulmonary embolism  No acute pathology in the abdomen or pelvis

## 2024-04-03 NOTE — PROCEDURAL SAFETY CHECKLIST WITH OR WITHOUT SEDATION - NSPOSTCOMMENTFT_GEN_ALL_CORE
pt prepped for procedure as per policy. probe in: bubbles: probe out: pt prepped for procedure as per policy. probe in: 1540 bubbles: 1545 probe out: 1546 DCCV @200J x1 1548

## 2024-04-03 NOTE — PROGRESS NOTE ADULT - PROBLEM SELECTOR PLAN 2
·  Problem: Atrial fibrillation.   ·  Recommendation: New onset afib with RVR in the setting of  pneumonia covid + , underlying hypertension hypertensive heart disease ,morbid obesity.  IV cardizem DC'd due to bradycardia 40's and pauses.  Short acting cardizem started however 5am dose held due to bradycardia.  Now 's and BP elevated.  Will give am dose BB and titrate BB as needed.     .  Plan for SERA/DCCV once respiratory status improves.  Will aim to do tomorrow as pt appears to be clinically improving. Will speak to cath lab   .  TSH WNL.  Maintain K>4, Mg>2    will need sleep study as OP , encourage weight loss ·  Problem: Atrial fibrillation.   ·  Recommendation: New onset afib with RVR in the setting of  pneumonia covid + , underlying hypertension hypertensive heart disease ,morbid obesity.  IV cardizem DC'd due to bradycardia 40's and pauses.  Short acting cardizem started however 5am dose held due to bradycardia.  Now 's and BP elevated.  Will give am dose BB and titrate BB as needed.     .  Plan for SERA/DCCV once respiratory status improves.    .  TSH WNL.  Maintain K>4, Mg>2    will need sleep study as OP , encourage weight loss

## 2024-04-03 NOTE — DISCHARGE NOTE NURSING/CASE MANAGEMENT/SOCIAL WORK - NSDCPEFALRISK_GEN_ALL_CORE
For information on Fall & Injury Prevention, visit: [email or web address] OR  [email or web address] OR  https://www.cdc.gov/steadi/patient.html

## 2024-04-03 NOTE — PROCEDURAL SAFETY CHECKLIST WITH OR WITHOUT SEDATION - NSPREALLERGYSED_GEN_ALL_CORE
Patient brought medications list    1. Have you been to the ER, urgent care clinic since your last visit? Hospitalized since your last visit? No    2. Have you seen or consulted any other health care providers outside of the [address] [place] [address] since your last visit? Include any pap smears or colon screening.  Yes Where: PCP Routine done

## 2024-04-03 NOTE — PROGRESS NOTE ADULT - SUBJECTIVE AND OBJECTIVE BOX
incomplete note    HOSPITALIST PROGRESS NOTE    Admission HPI: 39 y/o M w/ PMH of HTN, asthma, sleep apnea (non-adherent w/ CPAP), dyslipidemia, morbid obesity admitted on 4/1/24 w cough. Patient states he has been coughing for approximately 1.5 week. Cough is productive of brown sputum. Patient has been having intermittent fevers and chills. Patient has also been having associated dyspnea on exertion and palpitations. States he has been taking oral antibiotics and steroids outpatient without improvement.  Also feels like his abdomen is distended and c/o abdominal discomfort as well. Patient had 1 episode of vomiting on Thursday. Denies diarrhea / CP.  Social Hx: Tobacco - 3-4 cigarettes daily, but plans to quit after today, etoh - 3-4 drinks on the weekend. However patient states just came back from a 12 day cruise and had about 10 drinks per day. Drugs - denies     4/2/24 -  Patient seen and examined at bedside earlier today, + dyspnea, + productive cough, + tele - A fib with RVR to 130th , denies cp, abdominal pain    4/3/24 -Patent seen and examined.     SUBJECTIVE / INTERVAL HPI: Patient seen and examined at bedside.     ROS: All 10 systems reviewed and found to be negative with the exception of what has been described above.     VITAL SIGNS:  Vital Signs Last 24 Hrs  T(C): 36.7 (03 Apr 2024 05:10), Max: 36.9 (02 Apr 2024 20:40)  T(F): 98 (03 Apr 2024 05:10), Max: 98.4 (02 Apr 2024 20:40)  HR: 66 (03 Apr 2024 05:30) (66 - 122)  BP: 148/105 (03 Apr 2024 05:30) (122/97 - 148/105)  BP(mean): --  RR: 18 (03 Apr 2024 05:30) (18 - 18)  SpO2: 95% (03 Apr 2024 05:30) (92% - 95%)    Parameters below as of 03 Apr 2024 05:30  Patient On (Oxygen Delivery Method): room air    PHYSICAL EXAM:  General: No acute distress  HEENT: NC/AT; PERRL, anicteric sclera; MMM  Neck: Supple  Cardiovascular: +S1/S2, RRR, no murmurs, rubs, gallops  Respiratory: BS decreased at bases, + rales, +  rhonchi, +  wheezing  Gastrointestinal: soft, NT/ND; +BSx4  Extremities: WWP; no edema, clubbing or cyanosis  Vascular: 2+ radial, DP/PT pulses B/L  Neurological: AAOx3; no focal deficits    MEDICATIONS:  MEDICATIONS  (STANDING):  azithromycin   Tablet 500 milliGRAM(s) Oral every 24 hours  benzonatate 200 milliGRAM(s) Oral three times a day  cefTRIAXone Injectable. 2000 milliGRAM(s) IV Push every 24 hours  chlorhexidine 4% Liquid 1 Application(s) Topical <User Schedule>  dexAMETHasone     Tablet 6 milliGRAM(s) Oral daily  dextrose 5%. 1000 milliLiter(s) (50 mL/Hr) IV Continuous <Continuous>  dextrose 5%. 1000 milliLiter(s) (100 mL/Hr) IV Continuous <Continuous>  dextrose 50% Injectable 25 Gram(s) IV Push once  dextrose 50% Injectable 12.5 Gram(s) IV Push once  dextrose 50% Injectable 25 Gram(s) IV Push once  diltiazem    Tablet 30 milliGRAM(s) Oral every 8 hours  glucagon  Injectable 1 milliGRAM(s) IntraMuscular once  guaiFENesin Oral Liquid (Sugar-Free) 200 milliGRAM(s) Oral every 8 hours  heparin  Infusion. 2000 Unit(s)/Hr (20 mL/Hr) IV Continuous <Continuous>  influenza   Vaccine 0.5 milliLiter(s) IntraMuscular once  insulin lispro (ADMELOG) corrective regimen sliding scale   SubCutaneous at bedtime  insulin lispro (ADMELOG) corrective regimen sliding scale   SubCutaneous three times a day before meals  lactobacillus acidophilus 1 Tablet(s) Oral three times a day with meals  metoprolol tartrate 25 milliGRAM(s) Oral two times a day  tiotropium 2.5 MICROgram(s) Inhaler 2 Puff(s) Inhalation daily    MEDICATIONS  (PRN):  albuterol    90 MICROgram(s) HFA Inhaler 2 Puff(s) Inhalation every 6 hours PRN Bronchospasm  dextrose Oral Gel 15 Gram(s) Oral once PRN Blood Glucose LESS THAN 70 milliGRAM(s)/deciliter  heparin   Injectable 5000 Unit(s) IV Push every 6 hours PRN For aPTT between 40 - 57  heparin   Injectable 12072 Unit(s) IV Push every 6 hours PRN For aPTT less than 40      ALLERGIES:  Allergies    penicillins (Unknown)    Intolerances        LABS:                        14.4   10.60 )-----------( 257      ( 03 Apr 2024 06:09 )             42.5     04-03    141  |  110<H>  |  22  ----------------------------<  147<H>  4.1   |  24  |  1.17    Ca    8.9      03 Apr 2024 06:09  Phos  3.7     04-03  Mg     2.6     04-03    TPro  7.1  /  Alb  3.0<L>  /  TBili  0.8  /  DBili  0.2  /  AST  44<H>  /  ALT  259<H>  /  AlkPhos  76  04-03    PT/INR - ( 02 Apr 2024 04:58 )   PT: 14.8 sec;   INR: 1.32 ratio         PTT - ( 03 Apr 2024 02:44 )  PTT:84.9 sec  Urinalysis Basic - ( 03 Apr 2024 06:09 )    Color: x / Appearance: x / SG: x / pH: x  Gluc: 147 mg/dL / Ketone: x  / Bili: x / Urobili: x   Blood: x / Protein: x / Nitrite: x   Leuk Esterase: x / RBC: x / WBC x   Sq Epi: x / Non Sq Epi: x / Bacteria: x      CAPILLARY BLOOD GLUCOSE      POCT Blood Glucose.: 132 mg/dL (03 Apr 2024 06:33)        RADIOLOGY & ADDITIONAL TESTS: Reviewed. HOSPITALIST PROGRESS NOTE    Admission HPI: 39 y/o M w/ PMH of HTN, asthma, sleep apnea (non-adherent w/ CPAP), dyslipidemia, morbid obesity admitted on 4/1/24 w cough. Patient states he has been coughing for approximately 1.5 week. Cough is productive of brown sputum. Patient has been having intermittent fevers and chills. Patient has also been having associated dyspnea on exertion and palpitations. States he has been taking oral antibiotics and steroids outpatient without improvement.  Also feels like his abdomen is distended and c/o abdominal discomfort as well. Patient had 1 episode of vomiting on Thursday. Denies diarrhea / CP.  Social Hx: Tobacco - 3-4 cigarettes daily, but plans to quit after today, etoh - 3-4 drinks on the weekend. However patient states just came back from a 12 day cruise and had about 10 drinks per day. Drugs - denies     4/2/24 Patient seen and examined at bedside earlier today, + dyspnea, + productive cough, + tele - A fib with RVR to 130th , denies cp, abdominal pain    4/3/24 Patent seen and examined. Feels better today. Off oxygen. Denies chest pain, SOB, palpitations. Overnight had episodes of bradycardia and pauses, patient was asymptomatic. Plan for cardioversion today.     ROS: All 10 systems reviewed and found to be negative with the exception of what has been described above.     VITAL SIGNS:  Vital Signs Last 24 Hrs  T(C): 36.7 (03 Apr 2024 05:10), Max: 36.9 (02 Apr 2024 20:40)  T(F): 98 (03 Apr 2024 05:10), Max: 98.4 (02 Apr 2024 20:40)  HR: 66 (03 Apr 2024 05:30) (66 - 122)  BP: 148/105 (03 Apr 2024 05:30) (122/97 - 148/105)  BP(mean): --  RR: 18 (03 Apr 2024 05:30) (18 - 18)  SpO2: 95% (03 Apr 2024 05:30) (92% - 95%)    Parameters below as of 03 Apr 2024 05:30  Patient On (Oxygen Delivery Method): room air    PHYSICAL EXAM:  General: No acute distress  HEENT: NC/AT; PERRL, anicteric sclera; MMM  Neck: Supple  Cardiovascular: irregularly irregular   Respiratory: BS decreased at bases, no wheezing  Gastrointestinal: Obese, soft, NT/ND; +BSx4  Extremities: WWP; no edema, clubbing or cyanosis  Vascular: 2+ radial, DP/PT pulses B/L  Neurological: AAOx3; no focal deficits    MEDICATIONS:  MEDICATIONS  (STANDING):  azithromycin   Tablet 500 milliGRAM(s) Oral every 24 hours  benzonatate 200 milliGRAM(s) Oral three times a day  cefTRIAXone Injectable. 2000 milliGRAM(s) IV Push every 24 hours  chlorhexidine 4% Liquid 1 Application(s) Topical <User Schedule>  dexAMETHasone     Tablet 6 milliGRAM(s) Oral daily  dextrose 5%. 1000 milliLiter(s) (50 mL/Hr) IV Continuous <Continuous>  dextrose 5%. 1000 milliLiter(s) (100 mL/Hr) IV Continuous <Continuous>  dextrose 50% Injectable 25 Gram(s) IV Push once  dextrose 50% Injectable 12.5 Gram(s) IV Push once  dextrose 50% Injectable 25 Gram(s) IV Push once  diltiazem    Tablet 30 milliGRAM(s) Oral every 8 hours  glucagon  Injectable 1 milliGRAM(s) IntraMuscular once  guaiFENesin Oral Liquid (Sugar-Free) 200 milliGRAM(s) Oral every 8 hours  heparin  Infusion. 2000 Unit(s)/Hr (20 mL/Hr) IV Continuous <Continuous>  influenza   Vaccine 0.5 milliLiter(s) IntraMuscular once  insulin lispro (ADMELOG) corrective regimen sliding scale   SubCutaneous at bedtime  insulin lispro (ADMELOG) corrective regimen sliding scale   SubCutaneous three times a day before meals  lactobacillus acidophilus 1 Tablet(s) Oral three times a day with meals  metoprolol tartrate 25 milliGRAM(s) Oral two times a day  tiotropium 2.5 MICROgram(s) Inhaler 2 Puff(s) Inhalation daily    MEDICATIONS  (PRN):  albuterol    90 MICROgram(s) HFA Inhaler 2 Puff(s) Inhalation every 6 hours PRN Bronchospasm  dextrose Oral Gel 15 Gram(s) Oral once PRN Blood Glucose LESS THAN 70 milliGRAM(s)/deciliter  heparin   Injectable 5000 Unit(s) IV Push every 6 hours PRN For aPTT between 40 - 57  heparin   Injectable 67088 Unit(s) IV Push every 6 hours PRN For aPTT less than 40      ALLERGIES:  Allergies    penicillins (Unknown)    Intolerances        LABS:                        14.4   10.60 )-----------( 257      ( 03 Apr 2024 06:09 )             42.5     04-03    141  |  110<H>  |  22  ----------------------------<  147<H>  4.1   |  24  |  1.17    Ca    8.9      03 Apr 2024 06:09  Phos  3.7     04-03  Mg     2.6     04-03    TPro  7.1  /  Alb  3.0<L>  /  TBili  0.8  /  DBili  0.2  /  AST  44<H>  /  ALT  259<H>  /  AlkPhos  76  04-03    PT/INR - ( 02 Apr 2024 04:58 )   PT: 14.8 sec;   INR: 1.32 ratio         PTT - ( 03 Apr 2024 02:44 )  PTT:84.9 sec  Urinalysis Basic - ( 03 Apr 2024 06:09 )    Color: x / Appearance: x / SG: x / pH: x  Gluc: 147 mg/dL / Ketone: x  / Bili: x / Urobili: x   Blood: x / Protein: x / Nitrite: x   Leuk Esterase: x / RBC: x / WBC x   Sq Epi: x / Non Sq Epi: x / Bacteria: x      CAPILLARY BLOOD GLUCOSE      POCT Blood Glucose.: 132 mg/dL (03 Apr 2024 06:33)        RADIOLOGY & ADDITIONAL TESTS: Reviewed.

## 2024-04-03 NOTE — PROGRESS NOTE ADULT - ASSESSMENT
incomplete note    39 y/o M w/ PMH of HTN, asthma, sleep apnea (non-adherent w/ CPAP), dyslipidemia, morbid obesity admitted on 4/1/24 w cough. Patient states he has been coughing for approximately 1.5 week. Cough is productive of brown sputum. Patient has been having intermittent fevers and chills. Patient has also been having associated dyspnea on exertion and palpitations. States he has been taking oral antibiotics and steroids outpatient without improvement.  Also feels like his abdomen is distended and c/o abdominal discomfort as well. Patient had 1 episode of vomiting on Thursday. Denies diarrhea / CP.  Social Hx: Tobacco - 3-4 cigarettes daily, but plans to quit after today, etoh - 3-4 drinks on the weekend. However patient states just came back from a 12 day cruise and had about 10 drinks per day. Drugs - denies     New onset of A-fib w/ RVR  - Troponin negative x 1  - On Cardizem drip. Will need to monitor BP closely   - c/w metoprolol bid   - IV heparin --> plan to transition to Po eliquis   - Cardio consult - IV lasix x1 dose, c/w IV heparin , Cardizem and digoxin   -  EP consult - c/w IV cardizem, take BB , c/w IV heparin switch to Eliquis 5 bid , o/p Dr. Cotter follow up     Acute systolic heart failure   - Echo: Moderate, diffuse hypokinesis, EF = 35-40%, moerate Mitral regurg, mild triscuspid valve regur, mild pulm HTN   -4/2 s/p IV lasix  , BNP 2400--> 1700   - cardiology team follows  - daily weights     Sepsis 2/2 Pneumonia (possible gram negative source)  COVID-19 viral syndrome and Parainfluenza   -CT Chest: Small area of consolidation in the inferior lingula   -Patient failed outpatient antibiotics, and is s/p Vanco / Cefepime in ED.  - c/w ceftriaxone and Azithromycin as per ID team   - F/u blood cx , Lactate = 1.9  - RVP + for parainfluenza and covid 19   - Pulse ox monitoring   - Will avoid aggressive hydration as patient's EF is 35-40%  - mucolytics,  IS     Asthma exacerbation and h/o sleep apnea  -Albuterol  prn  , qd  Spiriva   - s/p prednisone --> dexamethasone 6 mg given Covid Pna  - Pulse ox monitoring  - If improving, can f/u outpatient w/ Pulm     New Transaminitis - Possibl 2/2 Etoh use   -CT Abd: Liver within normal limits   -GI consult   -Hepatitis panel  -Etoh cessation   -Hold statin     *Suspect MARIANELA vs CKD?  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 Patient states he has been coughing for approximately 1.5 week. Cough is productive of brown sputum. Patient has been having intermittent fevers and chills. Patient has also been having associated dyspnea on exertion and palpitations. States he has been taking oral antibiotics and steroids outpatient without improvement.  Also feels like his abdomen is distended and c/o abdominal discomfort as well. Patient had 1 episode of vomiting on Thursday. Denies diarrhea / CP.  Social Hx: Tobacco - 3-4 cigarettes daily, but plans to quit after today, etoh - 3-4 drinks on the weekend. However patient states just came back from a 12 day cruise and had about 10 drinks per day. Drugs - denies     New onset of A-fib w/ RVR  - Troponin negative x 3  - s/p Cardizem gtt  - c/w metoprolol bid   - Transition from IV Heparin to Eliquis 5 BID  - Cardio consult appreciated; plan for cardioversion today     Acute systolic heart failure   - Echo: Moderate, diffuse hypokinesis, EF = 35-40%, moderate Mitral regurg, mild tricuspid valve regur, mild pulm HTN   -4/2 s/p IV lasix  , BNP 2400--> 1700   - cardiology recommendations appreciated  - outpatient ischemic workup     Sepsis 2/2 Pneumonia (possible gram negative source)  COVID-19 viral syndrome and Parainfluenza   -CT Chest: Small area of consolidation in the inferior lingula   -Patient failed outpatient antibiotics, and is s/p Vanco / Cefepime in ED.  - c/w ceftriaxone and Azithromycin as per ID team   - F/u blood cx , Lactate = 1.9  - RVP + for parainfluenza and covid 19   - Pulse ox monitoring   - Will avoid aggressive hydration as patient's EF is 35-40%  - mucolytics,  IS     Asthma exacerbation and h/o sleep apnea  -Albuterol  prn  , qd  Spiriva   - s/p prednisone --> dexamethasone 6 mg given Covid Pna  - Pulse ox monitoring  - If improving, can f/u outpatient w/ Pulm     New Transaminitis - Possibl 2/2 Etoh use   -CT Abd: Liver within normal limits   -GI consult appreciated  -Hepatitis panel negative  -Etoh cessation   -Hold statin     *Suspect MARIANELA vs CKD?  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

## 2024-04-03 NOTE — PACU DISCHARGE NOTE - PAIN:
Controlled with current regime Quality 130: Documentation Of Current Medications In The Medical Record: Current Medications Documented Quality 402: Tobacco Use And Help With Quitting Among Adolescents: Patient screened for tobacco and never smoked Quality 431: Preventive Care And Screening: Unhealthy Alcohol Use - Screening: Patient screened for unhealthy alcohol use using a single question and scores less than 2 times per year Detail Level: Detailed Quality 110: Preventive Care And Screening: Influenza Immunization: Influenza Immunization previously received during influenza season

## 2024-04-03 NOTE — DISCHARGE NOTE NURSING/CASE MANAGEMENT/SOCIAL WORK - NSDCPEEMAIL_GEN_ALL_CORE
[facility] Tobacco [facility] email [email or web address]

## 2024-04-03 NOTE — PROGRESS NOTE ADULT - PROBLEM SELECTOR PLAN 3
·  Problem: Acute systolic congestive heart failure.  ·  Recommendation: as above, pt received one dose IV lasix and digoxin 0.25 mg IV x 2.  .  Continue BB and titrate as needed.  Try and avoid CCB in the setting of LVSD but may need to use for short term use to assist with rate control.

## 2024-04-03 NOTE — PROGRESS NOTE ADULT - ASSESSMENT
37 y/o M w/ PMH of HTN, asthma, sleep apnea (non-adherent w/ CPAP), dyslipidemia, p/w cough. Patient states he has been coughing for approximately 1.5 week. Cough is productive of brown sputum. Patient has been having intermittent fevers and chills. Patient has also been having associated dyspnea on exertion and palpitations. States he has been taking oral antibiotics and steroids outpatient without improvement.  Also feels like his abdomen is distended and c/o abdominal discomfort as well. Patient had 1 episode of vomiting on Thursday. Denies diarrhea / CP. Here noted with lingular consolidation on imaging RVP positive for covid19, parainfluenza. Pt was given IV abx, steroids. Noted to have recent [place]. Has elevated LFTs.     1.  Fever. Viral syndrome. Covid 19 viral syndrome. Parainfluenza. Superimposed pneumonia. Morbid obesity   - imaging reviewed   - on rocephin 2gm daily #2   - on azithromycin 500mg daily #2   - f/u sputum cx legionella ag, blood cx no growth   - gi eval noted f/u lfts- improving hep panel neg, us abd wnl  - continue with abx coverage  - if hypoxic, will add remdesivir  - on IV steroids  - tolerating abx well so far; no side effects noted  - reason for abx use and side effects reviewed with patient  - f/u CBC  - supportive care    2. Other issues:   -care per medicine    Clinical team may change from intravenous to oral antibiotics when the following criteria are met:   1. Patient is clinically improving/stable       a)	Improved signs and symptoms of infection from initial presentation       b)	Afebrile for 24 hours       c)	Leukocytosis trending towards normal range   2. Patient is tolerating oral intake   3. Initial/repeat blood cultures are negative     When above criteria met may change iv antibiotics to an appropriate oral agent

## 2024-04-03 NOTE — PROGRESS NOTE ADULT - PROBLEM SELECTOR PLAN 4
·  Problem: Hypertension.  ·  Recommendation: Uncontrolled.  Continue BB and titrate as needed.  Cardizem gtt DC'd and changed to short acting PO cardizem.  Pt takes losartan at home.  Would resume ARB once HR controlled if BP remains elevated

## 2024-04-03 NOTE — PROGRESS NOTE ADULT - SUBJECTIVE AND OBJECTIVE BOX
CHIEF COMPLAINT: shortness of breath for one week ,with cough not getting better     HPI:  37 y/o M w/ PMH of HTN, asthma, sleep apnea (non-adherent w/ CPAP), dyslipidemia, p/w cough. Patient states he has been coughing for approximately 1.5 week with shortness of breath activity  Cough is productive of brown sputum. Patient has been having intermittent fevers and chills. Patient has also been having associated dyspnea on exertion and palpitations. States he has been taking oral antibiotics and steroids outpatient without improvement ,from last monday .  Also feels like his abdomen is distended and c/o abdominal discomfort as well. Patient had 1 episode of vomiting on Thursday. Denies diarrhea / CP. , Patient was noted to be atrial fibrillation , with rapid rate , patient was started on cardizem drip  heart rate is still elevated dspite on 15 mg of Cardizem     Patient denies any chest pain ,  patient was able to sleep well last night after one week , feeling better     4/3/24: Pt denies cp.  Breathing and cough improving.  Tele: Afib 60's-130's.  Events overnight noted.  Pt noted to manuelito into the 40's while on cardizem gtt.  Gtt decreased to 5mg/hr however 2 sec pause noted.  Cardizem gtt DC'd and short acting cardizem ordered.  5am dose held this am as pt continued to have pauses on tele.  Pt asymptomatic.          PAST MEDICAL & SURGICAL HISTORY:  HTN (hypertension)      HLD (hyperlipidemia)    nose surgery       Allergies    penicillins (Unknown)    Intolerances        SOCIAL HISTORY:   3-4 cigarettes daily, but plans to quit after today, etoh - 3-4 drinks on the weekend. However patient states just came back from a 12 day cruise and had about 10 drinks per day. Drugs - denies       FAMILY HISTORY:  FH: CAD (coronary artery disease)    FH: type 2 diabetes    FH: HTN (hypertension)     H/o CAD and kidney disease in maternal grandmother and maternal aunt     MEDICATIONS:Home Medications:  amLODIPine 10 mg oral tablet: 1 tab(s) orally (01 Apr 2024 18:20)  azithromycin 250 mg oral tablet: orally *** Take 2 tablets by mouth today, then take 1 tablet daily for 4 days *** (01 Apr 2024 18:20)  benzonatate 200 mg oral capsule: 1 cap(s) orally 3 times a day (01 Apr 2024 18:20)  doxycycline hyclate 100 mg oral tablet: 1 tab(s) orally 2 times a day (01 Apr 2024 18:20)  fexofenadine 180 mg oral tablet: 1 tab(s) orally once a day (01 Apr 2024 18:20)  losartan 50 mg oral tablet: 1 tab(s) orally once a day (01 Apr 2024 18:20)  methylPREDNISolone 4 mg oral tablet: orally *** take 6 tablets on day 1 and decrease by 1 tab each day for a total of 6 days*** (01 Apr 2024 18:20)  rosuvastatin 5 mg oral tablet: 1 tab(s) orally once a day (01 Apr 2024 16:59)    MEDICATIONS  (STANDING):  azithromycin   Tablet 500 milliGRAM(s) Oral every 24 hours  benzonatate 200 milliGRAM(s) Oral three times a day  cefTRIAXone Injectable. 2000 milliGRAM(s) IV Push every 24 hours  chlorhexidine 4% Liquid 1 Application(s) Topical <User Schedule>  dexAMETHasone     Tablet 6 milliGRAM(s) Oral daily  dextrose 5%. 1000 milliLiter(s) (50 mL/Hr) IV Continuous <Continuous>  dextrose 5%. 1000 milliLiter(s) (100 mL/Hr) IV Continuous <Continuous>  dextrose 50% Injectable 12.5 Gram(s) IV Push once  dextrose 50% Injectable 25 Gram(s) IV Push once  dextrose 50% Injectable 25 Gram(s) IV Push once  diltiazem    Tablet 30 milliGRAM(s) Oral every 8 hours  glucagon  Injectable 1 milliGRAM(s) IntraMuscular once  guaiFENesin Oral Liquid (Sugar-Free) 200 milliGRAM(s) Oral every 8 hours  heparin  Infusion. 2000 Unit(s)/Hr (20 mL/Hr) IV Continuous <Continuous>  influenza   Vaccine 0.5 milliLiter(s) IntraMuscular once  insulin lispro (ADMELOG) corrective regimen sliding scale   SubCutaneous three times a day before meals  insulin lispro (ADMELOG) corrective regimen sliding scale   SubCutaneous at bedtime  lactobacillus acidophilus 1 Tablet(s) Oral three times a day with meals  metoprolol tartrate 25 milliGRAM(s) Oral two times a day  tiotropium 2.5 MICROgram(s) Inhaler 2 Puff(s) Inhalation daily    MEDICATIONS  (PRN):  albuterol    90 MICROgram(s) HFA Inhaler 2 Puff(s) Inhalation every 6 hours PRN Bronchospasm  dextrose Oral Gel 15 Gram(s) Oral once PRN Blood Glucose LESS THAN 70 milliGRAM(s)/deciliter  heparin   Injectable 19829 Unit(s) IV Push every 6 hours PRN For aPTT less than 40  heparin   Injectable 5000 Unit(s) IV Push every 6 hours PRN For aPTT between 40 - 57    Vital Signs Last 24 Hrs  T(C): 36.7 (03 Apr 2024 05:10), Max: 36.9 (02 Apr 2024 20:40)  T(F): 98 (03 Apr 2024 05:10), Max: 98.4 (02 Apr 2024 20:40)  HR: 66 (03 Apr 2024 05:30) (66 - 122)  BP: 148/105 (03 Apr 2024 05:30) (122/97 - 148/105)  BP(mean): --  RR: 18 (03 Apr 2024 05:30) (18 - 18)  SpO2: 95% (03 Apr 2024 05:30) (92% - 95%)    Parameters below as of 03 Apr 2024 05:30  Patient On (Oxygen Delivery Method): room air        I&O's Summary      PHYSICAL EXAM:    Constitutional: NAD, awake and alert, morbid obesity   HEENT: PERR, EOMI,  No oral cyananosis. short thick neck   Neck:  supple,  No JVD  Respiratory: Breath sounds with diminished breath sounds   Cardiovascular: S1 and S2, irreg rhythm, rate normal, no Murmurs, gallops or rubs  Gastrointestinal: Bowel Sounds present, soft, nontender, obese.   Extremities: No peripheral edema. No clubbing or cyanosis.  Vascular: 2+ peripheral pulses  Neurological: A/O x 3, no focal deficits  Musculoskeletal: no calf tenderness.  Skin: No rashes.      LABS: All Labs Reviewed:                                   14.4   10.60 )-----------( 257      ( 03 Apr 2024 06:09 )             42.5                14.5   11.79 )-----------( 249      ( 02 Apr 2024 04:58 )             42.3                         15.0   13.05 )-----------( 268      ( 01 Apr 2024 14:28 )             44.4     04-03    141  |  110<H>  |  22  ----------------------------<  147<H>  4.1   |  24  |  1.17    Ca    8.9      03 Apr 2024 06:09  Phos  3.7     04-03  Mg     2.6     04-03    TPro  7.1  /  Alb  3.0<L>  /  TBili  0.8  /  DBili  0.2  /  AST  44<H>  /  ALT  259<H>  /  AlkPhos  76  04-03      02 Apr 2024 04:58    139    |  109    |  17     ----------------------------<  150    3.8     |  25     |  1.18   01 Apr 2024 14:28    136    |  107    |  20     ----------------------------<  167    4.1     |  22     |  1.33     Ca    8.8        02 Apr 2024 04:58  Ca    8.4        01 Apr 2024 14:28  Mg     2.0       01 Apr 2024 14:28    TPro  6.7    /  Alb  3.2    /  TBili  1.0    /  DBili  x      /  AST  95     /  ALT  334    /  AlkPhos  79     02 Apr 2024 04:58  TPro  6.7    /  Alb  3.2    /  TBili  0.8    /  DBili  x      /  AST  113    /  ALT  339    /  AlkPhos  77     01 Apr 2024 14:28    PT/INR - ( 02 Apr 2024 04:58 )   PT: 14.8 sec;   INR: 1.32 ratio         PTT - ( 02 Apr 2024 04:58 )  PTT:144.5 sec    Pro-Brain Natriuretic Peptide: 2441 pg/mL (04.01.24 @ 14:28)     Blood Culture:     04-01 @ 14:28  TSH: 2.28    - TroponinI hsT: <-13.28, <-16.76  RADIOLOGY/EKG:  afib with rapid rate 142 BPM   !:44 Pm     4/2/24 afib with   prologned  qt       < from: TTE Echo Complete w/o Contrast w/ Doppler (04.01.24 @ 18:53) >     The left ventricle is dilated with moderate, diffuse hypokinesis;   estimated left ventricular ejection fraction is 35-40 %.   Moderate concentric left ventricular hypertrophy.   The left atrium is severely dilated.   The IVC is dilated with decreased respiratory variation.   Moderate mitral regurgitation.   Mild tricuspid valve regurgitation.   Mild pulmonary hypertension.     Note: Tachycardia is noted during the exam.     Signature     ----------------------------------------------------------------    < end of copied text >

## 2024-04-03 NOTE — PROGRESS NOTE ADULT - SUBJECTIVE AND OBJECTIVE BOX
Date of service: 04-03-24 @ 11:35    pt seen and examined  feels better  on RA not hypoxic has cough    ROS: no fever or chills; denies dizziness, no HA, no abdominal pain, no diarrhea or constipation; no dysuria, no urinary frequency, no legs pain, no rashes    MEDICATIONS  (STANDING):  azithromycin   Tablet 500 milliGRAM(s) Oral every 24 hours  benzonatate 200 milliGRAM(s) Oral three times a day  cefTRIAXone Injectable. 2000 milliGRAM(s) IV Push every 24 hours  chlorhexidine 4% Liquid 1 Application(s) Topical <User Schedule>  dexAMETHasone     Tablet 6 milliGRAM(s) Oral daily  dextrose 5%. 1000 milliLiter(s) (50 mL/Hr) IV Continuous <Continuous>  dextrose 5%. 1000 milliLiter(s) (100 mL/Hr) IV Continuous <Continuous>  dextrose 50% Injectable 12.5 Gram(s) IV Push once  dextrose 50% Injectable 25 Gram(s) IV Push once  dextrose 50% Injectable 25 Gram(s) IV Push once  diltiazem    Tablet 30 milliGRAM(s) Oral every 8 hours  glucagon  Injectable 1 milliGRAM(s) IntraMuscular once  guaiFENesin Oral Liquid (Sugar-Free) 200 milliGRAM(s) Oral every 8 hours  heparin  Infusion. 2000 Unit(s)/Hr (20 mL/Hr) IV Continuous <Continuous>  influenza   Vaccine 0.5 milliLiter(s) IntraMuscular once  insulin lispro (ADMELOG) corrective regimen sliding scale   SubCutaneous at bedtime  insulin lispro (ADMELOG) corrective regimen sliding scale   SubCutaneous three times a day before meals  lactobacillus acidophilus 1 Tablet(s) Oral three times a day with meals  metoprolol tartrate 25 milliGRAM(s) Oral two times a day  tiotropium 2.5 MICROgram(s) Inhaler 2 Puff(s) Inhalation daily    Vital Signs Last 24 Hrs  T(C): 36.5 (03 Apr 2024 08:54), Max: 36.9 (02 Apr 2024 20:40)  T(F): 97.7 (03 Apr 2024 08:54), Max: 98.4 (02 Apr 2024 20:40)  HR: 104 (03 Apr 2024 08:54) (66 - 120)  BP: 155/117 (03 Apr 2024 08:54) (125/93 - 155/117)  BP(mean): --  RR: 18 (03 Apr 2024 08:54) (18 - 18)  SpO2: 96% (03 Apr 2024 08:54) (92% - 96%)    Parameters below as of 03 Apr 2024 08:54  Patient On (Oxygen Delivery Method): room air        PE:  Constitutional: NAD  HEENT: NC/AT, EOMI, PERRLA, conjunctivae clear; ears and nose atraumatic; pharynx benign  Neck: supple; thyroid not palpable  Back: no tenderness  Respiratory: decreased breath sounds  Cardiovascular: S1S2 regular, no murmurs  Abdomen: soft, not tender, not distended, positive BS; liver and spleen WNL  Genitourinary: no suprapubic tenderness  Lymphatic: no LN palpable  Musculoskeletal: no muscle tenderness, no joint swelling or tenderness  Extremities: no pedal edema  Neurological/ Psychiatric: AxOx3, Judgement and insight normal;  moving all extremities  Skin: no rashes; no palpable lesions    Labs: all available labs reviewed                                   14.4   10.60 )-----------( 257      ( 03 Apr 2024 06:09 )             42.5     04-03    141  |  110<H>  |  22  ----------------------------<  147<H>  4.1   |  24  |  1.17    Ca    8.9      03 Apr 2024 06:09  Phos  3.7     04-03  Mg     2.6     04-03    TPro  7.1  /  Alb  3.0<L>  /  TBili  0.8  /  DBili  0.2  /  AST  44<H>  /  ALT  259<H>  /  AlkPhos  76  04-03      C-Reactive Protein, Serum: 119 mg/L (04-03-24 @ 06:09)  D-Dimer Assay, Quantitative: 183 ng/mL DDU (04-03-24 @ 06:09)  C-Reactive Protein, Serum: 50 mg/L (04-02-24 @ 04:58)    Urinalysis Basic - ( 02 Apr 2024 04:58 )    Color: x / Appearance: x / SG: x / pH: x  Gluc: 150 mg/dL / Ketone: x  / Bili: x / Urobili: x   Blood: x / Protein: x / Nitrite: x   Leuk Esterase: x / RBC: x / WBC x   Sq Epi: x / Non Sq Epi: x / Bacteria: x    Culture - Urine (04.01.24 @ 15:42)   Specimen Source: Clean Catch None  Culture Results:   No growthCulture - Blood (04.01.24 @ 15:07)   Specimen Source: .Blood Blood-Peripheral  Culture Results:   No growth at 24 hours  Culture - Blood (04.01.24 @ 14:28)   Specimen Source: .Blood Blood-Peripheral  Culture Results:   No growth at 24 hours    Radiology: all available radiological tests reviewed  < from: US Abdomen Upper Quadrant Right (04.02.24 @ 16:24) >    ACC: [id number] EXAM:  US ABDOMEN RT UPR QUADRANT   ORDERED BY: SYED GARCIA     PROCEDURE DATE:  04/02/2024          INTERPRETATION:  CLINICAL INFORMATION: Right upper quadrant abdominal   pain.    COMPARISON: 4/1/2024.    TECHNIQUE: Sonography of the right upper quadrant. Evaluation limited   secondary to portable technique.    FINDINGS:  Liver: The liver is prominent in size measuring 24 cm. Increased   echotexture suggests hepatic steatosis. No focal hepatic masses.  Bile ducts: Normal caliber. Common bile duct measures 3 mm.  Gallbladder: No gallstones, gallbladder wall thickening or   pericholecystic fluid.  Pancreas: Poorly visualized.  Right kidney: 11.7 cm. No hydronephrosis.  Ascites: None.  IVC: Visualized portions are within normallimits.    IMPRESSION:  No gallstones, gallbladder wall thickening or pericholecystic fluid.      Advanced directives addressed: full resuscitation

## 2024-04-03 NOTE — PROGRESS NOTE ADULT - ASSESSMENT
38 year old morbidly obese pt that presents with cough and complaints of fever who was found to be in new onset AT Fib with RVR in the setting of +COVID PNA and parainfluenza.  He has acute Heart Failure, EF 35-40%, may be tachy induced.     A/P: New onset AF with RVR in setting of covid PNA and parainfluenza  Continue tele monitoring- last night he had bradycardia and pauses during sleep (was asymptomatic)  Cardizem drip discontinued, would avoid PO cardizem with systolic dysfunction  Continue Metoprolol 25 mg PO BID, up titrate as needed  At home pt also takes Losartan  LVEF 35-40%, GDMT  Continue IV Heparin, change to DOAC, suggest Eliquis 5mg PO BID  Covid treatment as per medicine team, still on IV antibiotics  Keep electrolytes repleted, K>4, Mg>2  SERA/DCCV per cardiology team today, keep pt NPO.  MCOT/Zio on discharge with outpatient EP follow up.  Ischemic workup possibly as outpatient.  Plan d/w Dr. Bauman, patient, RN, cardiology team

## 2024-04-03 NOTE — PACU DISCHARGE NOTE - COMMENTS
pt returned to 3N, report given to kiara FANG. discharge instructions explained to pt. all questions and concerns addressed. pt instructed to remain NPO until 1700. pt placed back on tele monitor

## 2024-04-03 NOTE — CHART NOTE - NSCHARTNOTEFT_GEN_A_CORE
PROCEDURE NOTE: SERA/DCCV    INDICATION: Afib with RVR     Pt tolerated procedure well without complications.     SERA showed no evidence of MARSHALL thrombus- see echo report for all findings.     Received single 200J shock with conversion to NSR.

## 2024-04-03 NOTE — PROGRESS NOTE ADULT - PROBLEM SELECTOR PLAN 1
·  Problem: Shortness of breath.   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  Pt received one dose IV lasix.  Now lungs diminished.  .  negative troponin   .  Continue abx per primary team  .  Plan for SERA/DCCV once resp status improves.  Will attempt to do tomorrow ·  Problem: Shortness of breath.   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  Pt received one dose IV lasix.  Now lungs diminished.  .  negative troponin   .  Continue abx per primary team  .  Plan for SERA/DCCV

## 2024-04-04 ENCOUNTER — TRANSCRIPTION ENCOUNTER (OUTPATIENT)
Age: 39
End: 2024-04-04

## 2024-04-04 VITALS
OXYGEN SATURATION: 96 % | TEMPERATURE: 99 F | DIASTOLIC BLOOD PRESSURE: 97 MMHG | HEART RATE: 81 BPM | SYSTOLIC BLOOD PRESSURE: 143 MMHG | RESPIRATION RATE: 18 BRPM

## 2024-04-04 LAB
ALBUMIN SERPL ELPH-MCNC: 3.1 G/DL — LOW (ref 3.3–5)
ALP SERPL-CCNC: 75 U/L — SIGNIFICANT CHANGE UP (ref 40–120)
ALT FLD-CCNC: 193 U/L — HIGH (ref 12–78)
ANA PAT FLD IF-IMP: ABNORMAL
ANA TITR SER: ABNORMAL
ANION GAP SERPL CALC-SCNC: 7 MMOL/L — SIGNIFICANT CHANGE UP (ref 5–17)
APTT BLD: 35.3 SEC — SIGNIFICANT CHANGE UP (ref 24.5–35.6)
AST SERPL-CCNC: 23 U/L — SIGNIFICANT CHANGE UP (ref 15–37)
BILIRUB DIRECT SERPL-MCNC: 0.2 MG/DL — SIGNIFICANT CHANGE UP (ref 0–0.3)
BILIRUB INDIRECT FLD-MCNC: 0.4 MG/DL — SIGNIFICANT CHANGE UP (ref 0.2–1)
BILIRUB SERPL-MCNC: 0.6 MG/DL — SIGNIFICANT CHANGE UP (ref 0.2–1.2)
BUN SERPL-MCNC: 31 MG/DL — HIGH (ref 7–23)
CALCIUM SERPL-MCNC: 9.4 MG/DL — SIGNIFICANT CHANGE UP (ref 8.5–10.1)
CHLORIDE SERPL-SCNC: 109 MMOL/L — HIGH (ref 96–108)
CO2 SERPL-SCNC: 23 MMOL/L — SIGNIFICANT CHANGE UP (ref 22–31)
CREAT SERPL-MCNC: 1.37 MG/DL — HIGH (ref 0.5–1.3)
EGFR: 69 ML/MIN/1.73M2 — SIGNIFICANT CHANGE UP
GLUCOSE BLDC GLUCOMTR-MCNC: 133 MG/DL — HIGH (ref 70–99)
GLUCOSE BLDC GLUCOMTR-MCNC: 197 MG/DL — HIGH (ref 70–99)
GLUCOSE SERPL-MCNC: 159 MG/DL — HIGH (ref 70–99)
HCT VFR BLD CALC: 46.4 % — SIGNIFICANT CHANGE UP (ref 39–50)
HGB BLD-MCNC: 15.6 G/DL — SIGNIFICANT CHANGE UP (ref 13–17)
MAGNESIUM SERPL-MCNC: 2.6 MG/DL — SIGNIFICANT CHANGE UP (ref 1.6–2.6)
MCHC RBC-ENTMCNC: 30.5 PG — SIGNIFICANT CHANGE UP (ref 27–34)
MCHC RBC-ENTMCNC: 33.6 GM/DL — SIGNIFICANT CHANGE UP (ref 32–36)
MCV RBC AUTO: 90.6 FL — SIGNIFICANT CHANGE UP (ref 80–100)
PLATELET # BLD AUTO: 293 K/UL — SIGNIFICANT CHANGE UP (ref 150–400)
POTASSIUM SERPL-MCNC: 4.1 MMOL/L — SIGNIFICANT CHANGE UP (ref 3.5–5.3)
POTASSIUM SERPL-SCNC: 4.1 MMOL/L — SIGNIFICANT CHANGE UP (ref 3.5–5.3)
PROT SERPL-MCNC: 7.2 GM/DL — SIGNIFICANT CHANGE UP (ref 6–8.3)
RBC # BLD: 5.12 M/UL — SIGNIFICANT CHANGE UP (ref 4.2–5.8)
RBC # FLD: 14.8 % — HIGH (ref 10.3–14.5)
SMOOTH MUSCLE AB SER-ACNC: SIGNIFICANT CHANGE UP
SODIUM SERPL-SCNC: 139 MMOL/L — SIGNIFICANT CHANGE UP (ref 135–145)
WBC # BLD: 12.53 K/UL — HIGH (ref 3.8–10.5)
WBC # FLD AUTO: 12.53 K/UL — HIGH (ref 3.8–10.5)

## 2024-04-04 PROCEDURE — 99239 HOSP IP/OBS DSCHRG MGMT >30: CPT

## 2024-04-04 PROCEDURE — 93010 ELECTROCARDIOGRAM REPORT: CPT

## 2024-04-04 PROCEDURE — 99233 SBSQ HOSP IP/OBS HIGH 50: CPT

## 2024-04-04 RX ORDER — DEXAMETHASONE 0.5 MG/5ML
1 ELIXIR ORAL
Qty: 5 | Refills: 0
Start: 2024-04-04 | End: 2024-04-08

## 2024-04-04 RX ORDER — CEFUROXIME AXETIL 250 MG
1 TABLET ORAL
Qty: 10 | Refills: 0
Start: 2024-04-04 | End: 2024-04-08

## 2024-04-04 RX ORDER — LOSARTAN POTASSIUM 100 MG/1
1 TABLET, FILM COATED ORAL
Refills: 0 | DISCHARGE

## 2024-04-04 RX ORDER — LOSARTAN POTASSIUM 100 MG/1
25 TABLET, FILM COATED ORAL DAILY
Refills: 0 | Status: DISCONTINUED | OUTPATIENT
Start: 2024-04-04 | End: 2024-04-04

## 2024-04-04 RX ORDER — AMLODIPINE BESYLATE 2.5 MG/1
1 TABLET ORAL
Refills: 0 | DISCHARGE

## 2024-04-04 RX ORDER — ALBUTEROL 90 UG/1
2 AEROSOL, METERED ORAL
Qty: 1 | Refills: 0
Start: 2024-04-04

## 2024-04-04 RX ORDER — APIXABAN 2.5 MG/1
1 TABLET, FILM COATED ORAL
Qty: 60 | Refills: 0
Start: 2024-04-04 | End: 2024-05-03

## 2024-04-04 RX ORDER — METOPROLOL TARTRATE 50 MG
1 TABLET ORAL
Qty: 30 | Refills: 0
Start: 2024-04-04 | End: 2024-05-03

## 2024-04-04 RX ORDER — LOSARTAN POTASSIUM 100 MG/1
1 TABLET, FILM COATED ORAL
Qty: 30 | Refills: 0
Start: 2024-04-04 | End: 2024-05-03

## 2024-04-04 RX ADMIN — Medication 1: at 11:24

## 2024-04-04 RX ADMIN — LOSARTAN POTASSIUM 25 MILLIGRAM(S): 100 TABLET, FILM COATED ORAL at 11:28

## 2024-04-04 RX ADMIN — Medication 1 TABLET(S): at 11:29

## 2024-04-04 RX ADMIN — TIOTROPIUM BROMIDE 2 PUFF(S): 18 CAPSULE ORAL; RESPIRATORY (INHALATION) at 13:56

## 2024-04-04 RX ADMIN — Medication 200 MILLIGRAM(S): at 07:00

## 2024-04-04 RX ADMIN — Medication 25 MILLIGRAM(S): at 11:26

## 2024-04-04 RX ADMIN — AZITHROMYCIN 500 MILLIGRAM(S): 500 TABLET, FILM COATED ORAL at 11:28

## 2024-04-04 RX ADMIN — APIXABAN 5 MILLIGRAM(S): 2.5 TABLET, FILM COATED ORAL at 11:28

## 2024-04-04 RX ADMIN — Medication 6 MILLIGRAM(S): at 11:26

## 2024-04-04 NOTE — PROGRESS NOTE ADULT - PROBLEM SELECTOR PROBLEM 3
Acute systolic congestive heart failure
Hypertension
Hypertension
Acute systolic congestive heart failure
Hypertension

## 2024-04-04 NOTE — DISCHARGE NOTE PROVIDER - CARE PROVIDERS DIRECT ADDRESSES
[email or web address]

## 2024-04-04 NOTE — DISCHARGE NOTE PROVIDER - HOSPITAL COURSE
Admission HPI: 37 y/o M w/ PMH of HTN, asthma, sleep apnea (non-adherent w/ CPAP), dyslipidemia, morbid obesity admitted on 4/1/24 w cough. Patient states he has been coughing for approximately 1.5 week. Cough is productive of brown sputum. Patient has been having intermittent fevers and chills. Patient has also been having associated dyspnea on exertion and palpitations. States he has been taking oral antibiotics and steroids outpatient without improvement.  Also feels like his abdomen is distended and c/o abdominal discomfort as well. Patient had 1 episode of vomiting on Thursday. Denies diarrhea / CP.  Social Hx: Tobacco - 3-4 cigarettes daily, but plans to quit after today, etoh - 3-4 drinks on the weekend. However patient states just came back from a 12 day cruise and had about 10 drinks per day. Drugs - denies     4/4: Patient seen and examined at bedside. s/p successful cardioversion on 4/3; now in NSR. Feels well without complaints. ROS positive only for mild cough. Stable for discharge home.    Hospital course (by problem):   New onset of A-fib w/ RVR  - Troponin negative x 3  - s/p Cardizem gtt  - c/w metoprolol bid, transition to Toprol on discharge  - Transition from IV Heparin to Eliquis 5 BID  - Cardio consult appreciated; s/p successful cardioversion on 4/3, now in NDR    Acute systolic heart failure   - Echo: Moderate, diffuse hypokinesis, EF = 35-40%, moderate Mitral regurg, mild tricuspid valve regur, mild pulm HTN   -4/2 s/p IV lasix  , BNP 2400--> 1700   - cardiology recommendations appreciated  - outpatient ischemic workup     Sepsis 2/2 Pneumonia (possible gram negative source)  COVID-19 viral syndrome and Parainfluenza   -CT Chest: Small area of consolidation in the inferior lingula   -Leukocytosis slightly increased today, however overall clinically improving. Steroids could also be contributing   -Given ceftriaxone and Azithromycin during hospital course, transition to Ceftin 500 BID upon discharge  -ID recommendations appreciated    Asthma exacerbation and h/o sleep apnea  - Albuterol  prn  , qd  Spiriva   - s/p prednisone --> dexamethasone 6 mg given Covid PNA    New Transaminitis - likely in setting of viral infections  -CT Abd: Liver within normal limits   -GI consult appreciated  -Hepatitis panel negative  -Etoh cessation   -Downtrending     *Suspect MARIANELA vs CKD?  -Cr fluctuating between 1.1-1.3, monitor outpatient    *Incidentally noted umiblical hernia + B/L L5 lysis defects  -F/u outpatient     *h/o Pre-diabetes A1C 6.9 consistent with DM2  -Will start ISS while patient is on steroids     Patient was discharged to: Home    Physical exam at the time of discharge:  LOS: 3d    VITALS:   T(C): 36.3 (04-04-24 @ 08:38), Max: 36.8 (04-03-24 @ 16:56)  HR: 78 (04-04-24 @ 08:38) (75 - 118)  BP: 134/89 (04-04-24 @ 08:38) (124/78 - 152/102)  RR: 18 (04-04-24 @ 08:38) (15 - 20)  SpO2: 96% (04-04-24 @ 08:38) (93% - 98%)    PHYSICAL EXAM:  General: No acute distress  HEENT: NC/AT; PERRL, anicteric sclera; MMM  Neck: Supple  Cardiovascular: irregularly irregular   Respiratory: BS decreased at bases, no wheezing  Gastrointestinal: Obese, soft, NT/ND; +BSx4  Extremities: WWP; no edema, clubbing or cyanosis  Vascular: 2+ radial, DP/PT pulses B/L  Neurological: AAOx3; no focal deficits       Admission HPI: 37 y/o M w/ PMH of HTN, asthma, sleep apnea (non-adherent w/ CPAP), dyslipidemia, morbid obesity admitted on 4/1/24 w cough. Patient states he has been coughing for approximately 1.5 week. Cough is productive of brown sputum. Patient has been having intermittent fevers and chills. Patient has also been having associated dyspnea on exertion and palpitations. States he has been taking oral antibiotics and steroids outpatient without improvement.  Also feels like his abdomen is distended and c/o abdominal discomfort as well. Patient had 1 episode of vomiting on Thursday. Denies diarrhea / CP.  Social Hx: Tobacco - 3-4 cigarettes daily, but plans to quit after today, etoh - 3-4 drinks on the weekend. However patient states just came back from a 12 day cruise and had about 10 drinks per day. Drugs - denies     4/4: Patient seen and examined at bedside. s/p successful cardioversion on 4/3; now in NSR. Feels well without complaints. ROS positive only for mild cough. Stable for discharge home.    Hospital course (by problem):   New onset of A-fib w/ RVR  - Troponin negative x 3  - s/p Cardizem gtt  - c/w metoprolol bid, transition to Toprol on discharge  - Transition from IV Heparin to Eliquis 5 BID  - Cardio consult appreciated; s/p successful cardioversion on 4/3, now in NDR    Acute systolic heart failure   - Echo: Moderate, diffuse hypokinesis, EF = 35-40%, moderate Mitral regurg, mild tricuspid valve regur, mild pulm HTN   -4/2 s/p IV lasix  , BNP 2400--> 1700   - cardiology recommendations appreciated. Reduced Losartan to 25 mg daily. Hold Amlodipine on discharge. Further adjustments outpatient  - outpatient ischemic workup     Sepsis 2/2 Pneumonia (possible gram negative source)  COVID-19 viral syndrome and Parainfluenza   -CT Chest: Small area of consolidation in the inferior lingula   -Leukocytosis slightly increased today, however overall clinically improving. Steroids could also be contributing   -Given ceftriaxone and Azithromycin during hospital course, transition to Ceftin 500 BID upon discharge  -ID recommendations appreciated    Asthma exacerbation and h/o sleep apnea  - Albuterol  prn  , qd  Spiriva   - s/p prednisone --> dexamethasone 6 mg given Covid PNA    New Transaminitis - likely in setting of viral infections  -CT Abd: Liver within normal limits   -GI consult appreciated  -Hepatitis panel negative  -Etoh cessation   -Downtrending     *Suspect MARIANELA vs CKD?  -Cr fluctuating between 1.1-1.3, monitor outpatient    *Incidentally noted umiblical hernia + B/L L5 lysis defects  -F/u outpatient     *h/o Pre-diabetes A1C 6.9 consistent with DM2  -Will start ISS while patient is on steroids   -Diet lifestyle modifications     Patient was discharged to: Home    Physical exam at the time of discharge:  LOS: 3d    VITALS:   T(C): 36.3 (04-04-24 @ 08:38), Max: 36.8 (04-03-24 @ 16:56)  HR: 78 (04-04-24 @ 08:38) (75 - 118)  BP: 134/89 (04-04-24 @ 08:38) (124/78 - 152/102)  RR: 18 (04-04-24 @ 08:38) (15 - 20)  SpO2: 96% (04-04-24 @ 08:38) (93% - 98%)    PHYSICAL EXAM:  General: No acute distress  HEENT: NC/AT; PERRL, anicteric sclera; MMM  Neck: Supple  Cardiovascular: irregularly irregular   Respiratory: BS decreased at bases, no wheezing  Gastrointestinal: Obese, soft, NT/ND; +BSx4  Extremities: WWP; no edema, clubbing or cyanosis  Vascular: 2+ radial, DP/PT pulses B/L  Neurological: AAOx3; no focal deficits

## 2024-04-04 NOTE — PROGRESS NOTE ADULT - PROBLEM SELECTOR PLAN 1
·  Problem: Shortness of breath.   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  Pt received one dose IV lasix.  Now lungs diminished.  .  negative troponin   .  Continue abx per primary team  .  Plan for SERA/DCCV ·  Problem: Shortness of breath.   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  Pt received one dose IV lasix.  Now lungs diminished.  .  negative troponin   . S/P SERA/DCCV, now in RSR.  SOB improved.        . Continue BB.  Continue abx per primary team due to COVID PNA, Afib with RVR and newly reduced LVEF  s/p lasix x1.   negative troponin   . S/P SERA/DCCV, now in RSR.  SOB improved.

## 2024-04-04 NOTE — PROGRESS NOTE ADULT - PROVIDER SPECIALTY LIST ADULT
Electrophysiology
Hospitalist
Infectious Disease
Electrophysiology
Electrophysiology
Infectious Disease
Gastroenterology
Hospitalist
Cardiology
Cardiology

## 2024-04-04 NOTE — PROVIDER CONTACT NOTE (OTHER) - SITUATION
notified service; spoke to Kosta
notified service; spoke to Page
Patient on cardizem gtt for afib RVR. HR has been sustaining 80s, occasionally dropping to 50s, high 40s. Pt also had 2x 2sec pauses.
Patient manuelito into 40s while asleep briefly, NSR. Back to 70s.
Pt had 4.56sec pause and 3.59sec pause. Hr manuelito down to 29, non-sustained. Pt remains afib 80s-100s otherwise.
Patient has been off of cardizem gtt for ~4hours, however HR dropped to 32 while he was asleep.
Pt RVP came back positive for COVID and parainfluenza 3

## 2024-04-04 NOTE — DISCHARGE NOTE PROVIDER - NSDCCPTREATMENT_GEN_ALL_CORE_FT
PRINCIPAL PROCEDURE  Procedure: TTE (transthoracic echocardiography)  Findings and Treatment: Findings   Mitral Valve   The mitral valve leaflets appear thin and normal.   Moderate mitral regurgitation.   Aortic Valve   Normal aortic valve structure and function.   Tricuspid Valve   Normal appearing tricuspid valve structure.   Mild tricuspid valve regurgitation.   Mild pulmonary hypertension.   Pulmonic Valve   Pulmonic valve not well seen, probably normal pulmonic valve function.   Left Atrium   The left atrium is severely dilated.   Left Ventricle   The left ventricle is dilated with moderate, diffuse hypokinesis;   estimated left ventricular ejection fraction is 35-40 %.   Moderate concentric left ventricular hypertrophy.   Tachycardia is noted during the exam.   Right Atrium   The right atrium appears mildly dilated.   Right Ventricle   Normal appearing right ventricle structure and function.   Pericardial Effusion   No evidence of pericardial effusion.   Pleural Effusion   No evidence of pleural effusion.   Miscellaneous   The IVC is dilated with decreased respiratory variation.   Impression   Summary   The left ventricle is dilated with moderate, diffuse hypokinesis;   estimated left ventricular ejection fraction is 35-40 %.   Moderate concentric left ventricular hypertrophy.   The left atrium is severely dilated.   The IVC is dilated with decreased respiratory variation.   Moderate mitral regurgitation.   Mild tricuspid valve regurgitation.   Mild pulmonary hypertension        SECONDARY PROCEDURE  Procedure: CT scan  Findings and Treatment: FINDINGS:  CHEST:  LUNGS AND LARGE AIRWAYS: Patent central airways. No pulmonary nodules.   Patchy consolidation in the inferior lingula  PLEURA: No pleural effusion.  VESSELS: Within normal limits.  HEART: Heart size is normal. No pericardial effusion.  MEDIASTINUM AND CHARMAINE: No lymphadenopathy.  CHEST WALL AND LOWER NECK: Within normal limits.  ABDOMEN AND PELVIS:  LIVER: Within normal limits.  BILE DUCTS: Normal caliber.  GALLBLADDER: Within normal limits.  SPLEEN: Within normal limits.  PANCREAS: Within normal limits.  ADRENALS: Within normal limits.  KIDNEYS/URETERS: No hydronephrosis or urolithiasis. Focal scarring lower   pole left kidney.  BLADDER: Within normal limits.  REPRODUCTIVE ORGANS: Prostate within normal limits.  BOWEL: No bowel obstruction. Appendix is normal.  PERITONEUM: No ascites.  VESSELS: Within normal limits.  RETROPERITONEUM/LYMPH NODES: No lymphadenopathy.  ABDOMINAL WALL: Tiny fat-containing umbilical hernia.  BONES: Mild degenerative change. Bilateral L5 lysis defects without   spondylolisthesis  IMPRESSION:  Small area of consolidation in the inferior lingula may represent   pneumonia versus atelectasis.  No evidence of pulmonary embolism  No acute pathology in the abdomen or pelvis

## 2024-04-04 NOTE — PROGRESS NOTE ADULT - SUBJECTIVE AND OBJECTIVE BOX
38y Male admitted for SOB and irregular heart beat.  Pt said it started last Thursday. He was started on Diltiazem Drip and his ventricular heart rate continues to reach 130-140s.  Dr. Tillman added on Metoprolol  He was not on any AV libby blockers at home  Found to have +COVID PNA and +Parainfluenza    4/2/24:  pt received 1 of 2 doses Dig IV, HRs are better than earlier this morning.  Tmax 100.9 overnight  TELE: atrial fib 105-120 bpm    4/3/24:  pt seen resting in bed, he is feeling better today, afebrile, still has cough, sputum is clear, sometimes pink tinged per pt.  Events of last night noted with transient episodes of bradycardia and pauses.  Pt denies any symptoms and said he slept well.  Cardizem gtt discontinued.  TELE: Atrial fib rate currently 100-110 bpm,  episodes of RVRs this morning    4/4/24: pt resting in NAD, feels much better, he is s/p SERA/DCCV yesterday and remains in SR  TELE: SR rate 70-80 bpm, overnight manuelito during sleep, asymptomatic, ventricular triplet noted.      MEDICATIONS  (STANDING):  apixaban 5 milliGRAM(s) Oral every 12 hours  azithromycin   Tablet 500 milliGRAM(s) Oral every 24 hours  benzonatate 200 milliGRAM(s) Oral three times a day  cefTRIAXone Injectable. 2000 milliGRAM(s) IV Push every 24 hours  chlorhexidine 4% Liquid 1 Application(s) Topical <User Schedule>  dexAMETHasone     Tablet 6 milliGRAM(s) Oral daily  dextrose 5%. 1000 milliLiter(s) (50 mL/Hr) IV Continuous <Continuous>  dextrose 5%. 1000 milliLiter(s) (100 mL/Hr) IV Continuous <Continuous>  dextrose 50% Injectable 12.5 Gram(s) IV Push once  dextrose 50% Injectable 25 Gram(s) IV Push once  dextrose 50% Injectable 25 Gram(s) IV Push once  glucagon  Injectable 1 milliGRAM(s) IntraMuscular once  guaiFENesin Oral Liquid (Sugar-Free) 200 milliGRAM(s) Oral every 8 hours  influenza   Vaccine 0.5 milliLiter(s) IntraMuscular once  insulin lispro (ADMELOG) corrective regimen sliding scale   SubCutaneous three times a day before meals  insulin lispro (ADMELOG) corrective regimen sliding scale   SubCutaneous at bedtime  lactobacillus acidophilus 1 Tablet(s) Oral three times a day with meals  losartan 25 milliGRAM(s) Oral daily  metoprolol tartrate 25 milliGRAM(s) Oral two times a day  tiotropium 2.5 MICROgram(s) Inhaler 2 Puff(s) Inhalation daily    MEDICATIONS  (PRN):  albuterol    90 MICROgram(s) HFA Inhaler 2 Puff(s) Inhalation every 6 hours PRN Bronchospasm  dextrose Oral Gel 15 Gram(s) Oral once PRN Blood Glucose LESS THAN 70 milliGRAM(s)/deciliter      Allergies    penicillins (Unknown)    Intolerances    Vital Signs Last 24 Hrs  T(C): 36.3 (04 Apr 2024 08:38), Max: 36.8 (03 Apr 2024 16:56)  T(F): 97.3 (04 Apr 2024 08:38), Max: 98.3 (03 Apr 2024 16:56)  HR: 78 (04 Apr 2024 08:38) (75 - 118)  BP: 134/89 (04 Apr 2024 08:38) (124/78 - 158/110)  BP(mean): --  RR: 18 (04 Apr 2024 08:38) (15 - 20)  SpO2: 96% (04 Apr 2024 08:38) (93% - 98%)    Parameters below as of 04 Apr 2024 08:38  Patient On (Oxygen Delivery Method): room air                          15.6   12.53 )-----------( 293      ( 04 Apr 2024 07:51 )             46.4     04-04    139  |  109<H>  |  31<H>  ----------------------------<  159<H>  4.1   |  23  |  1.37<H>    Ca    9.4      04 Apr 2024 07:51  Phos  3.7     04-03  Mg     2.6     04-04    TPro  7.2  /  Alb  3.1<L>  /  TBili  0.6  /  DBili  0.2  /  AST  23  /  ALT  193<H>  /  AlkPhos  75  04-04      < from: SERA Complete w/Spect and Color (04.03.24 @ 16:08) >   Impression     Summary   A transesophageal echocardiogram was performed and included probe   placement in the esophagus posterior to the heart by the interpreting   physician, real-time image acquisition and interpretation utilizing 2-D,   color flow Doppler, pulsed wave and/or continuous wave with spectral   display. The patient received IV sedation. The procedure was monitored   with automatic blood pressure monitoring, telemetry tracings, and pulse   oximetry. The patient tolerated the procedure well and there were no   complications.     Reduced left ventricular systolic function. Estimated LVEF is 40%.   No evidence of right atrial, left atrial or left atrial appendage   thrombus.   Moderate mitral regurgitation.   Mild tricuspid regurgitation.   Mild pulmonic regurgitation.        < from: TTE Echo Complete w/o Contrast w/ Doppler (04.01.24 @ 18:53) >   Impression     Summary     The left ventricle is dilated with moderate, diffuse hypokinesis;   estimated left ventricular ejection fraction is 35-40 %.   Moderate concentric left ventricular hypertrophy.   The left atrium is severely dilated.   The IVC is dilated with decreased respiratory variation.   Moderate mitral regurgitation.   Mild tricuspid valve regurgitation.   Mild pulmonary hypertension.     Note: Tachycardia is noted during the exam.      < from: CT Angio Chest PE Protocol w/ IV Cont (04.01.24 @ 15:35) >  IMPRESSION:  Small area of consolidation in the inferior lingula may represent   pneumonia versus atelectasis.  No evidence of pulmonary embolism  No acute pathology in the abdomen or pelvis

## 2024-04-04 NOTE — PROVIDER CONTACT NOTE (OTHER) - DATE AND TIME:
03-Apr-2024 05:53
04-Apr-2024 03:37
01-Apr-2024 21:02
03-Apr-2024 01:12
01-Apr-2024 21:19
03-Apr-2024 05:16
02-Apr-2024 05:10

## 2024-04-04 NOTE — PROGRESS NOTE ADULT - SUBJECTIVE AND OBJECTIVE BOX
CHIEF COMPLAINT: shortness of breath for one week ,with cough not getting better     HPI:  39 y/o M w/ PMH of HTN, asthma, sleep apnea (non-adherent w/ CPAP), dyslipidemia, p/w cough. Patient states he has been coughing for approximately 1.5 week with shortness of breath activity  Cough is productive of brown sputum. Patient has been having intermittent fevers and chills. Patient has also been having associated dyspnea on exertion and palpitations. States he has been taking oral antibiotics and steroids outpatient without improvement ,from last monday .  Also feels like his abdomen is distended and c/o abdominal discomfort as well. Patient had 1 episode of vomiting on Thursday. Denies diarrhea / CP. , Patient was noted to be atrial fibrillation , with rapid rate , patient was started on cardizem drip  heart rate is still elevated dspite on 15 mg of Cardizem     Patient denies any chest pain ,  patient was able to sleep well last night after one week , feeling better     4/3/24: Pt denies cp.  Breathing and cough improving.  Tele: Afib 60's-130's.  Events overnight noted.  Pt noted to manuelito into the 40's while on cardizem gtt.  Gtt decreased to 5mg/hr however 2 sec pause noted.  Cardizem gtt DC'd and short acting cardizem ordered.  5am dose held this am as pt continued to have pauses on tele.  Pt asymptomatic.  4/4/24:  S/P SERA/DCCV yesterday.  Tele:           PAST MEDICAL & SURGICAL HISTORY:  HTN (hypertension)      HLD (hyperlipidemia)    nose surgery       Allergies    penicillins (Unknown)    Intolerances        SOCIAL HISTORY:   3-4 cigarettes daily, but plans to quit after today, etoh - 3-4 drinks on the weekend. However patient states just came back from a 12 day cruise and had about 10 drinks per day. Drugs - denies       FAMILY HISTORY:  FH: CAD (coronary artery disease)    FH: type 2 diabetes    FH: HTN (hypertension)     H/o CAD and kidney disease in maternal grandmother and maternal aunt     MEDICATIONS:Home Medications:  amLODIPine 10 mg oral tablet: 1 tab(s) orally (01 Apr 2024 18:20)  azithromycin 250 mg oral tablet: orally *** Take 2 tablets by mouth today, then take 1 tablet daily for 4 days *** (01 Apr 2024 18:20)  benzonatate 200 mg oral capsule: 1 cap(s) orally 3 times a day (01 Apr 2024 18:20)  doxycycline hyclate 100 mg oral tablet: 1 tab(s) orally 2 times a day (01 Apr 2024 18:20)  fexofenadine 180 mg oral tablet: 1 tab(s) orally once a day (01 Apr 2024 18:20)  losartan 50 mg oral tablet: 1 tab(s) orally once a day (01 Apr 2024 18:20)  methylPREDNISolone 4 mg oral tablet: orally *** take 6 tablets on day 1 and decrease by 1 tab each day for a total of 6 days*** (01 Apr 2024 18:20)  rosuvastatin 5 mg oral tablet: 1 tab(s) orally once a day (01 Apr 2024 16:59)    MEDICATIONS  (STANDING):  apixaban 5 milliGRAM(s) Oral every 12 hours  azithromycin   Tablet 500 milliGRAM(s) Oral every 24 hours  benzonatate 200 milliGRAM(s) Oral three times a day  cefTRIAXone Injectable. 2000 milliGRAM(s) IV Push every 24 hours  chlorhexidine 4% Liquid 1 Application(s) Topical <User Schedule>  dexAMETHasone     Tablet 6 milliGRAM(s) Oral daily  dextrose 5%. 1000 milliLiter(s) (50 mL/Hr) IV Continuous <Continuous>  dextrose 5%. 1000 milliLiter(s) (100 mL/Hr) IV Continuous <Continuous>  dextrose 50% Injectable 25 Gram(s) IV Push once  dextrose 50% Injectable 12.5 Gram(s) IV Push once  dextrose 50% Injectable 25 Gram(s) IV Push once  diltiazem    Tablet 30 milliGRAM(s) Oral every 8 hours  glucagon  Injectable 1 milliGRAM(s) IntraMuscular once  guaiFENesin Oral Liquid (Sugar-Free) 200 milliGRAM(s) Oral every 8 hours  influenza   Vaccine 0.5 milliLiter(s) IntraMuscular once  insulin lispro (ADMELOG) corrective regimen sliding scale   SubCutaneous at bedtime  insulin lispro (ADMELOG) corrective regimen sliding scale   SubCutaneous three times a day before meals  lactobacillus acidophilus 1 Tablet(s) Oral three times a day with meals  metoprolol tartrate 25 milliGRAM(s) Oral two times a day  tiotropium 2.5 MICROgram(s) Inhaler 2 Puff(s) Inhalation daily    MEDICATIONS  (PRN):  albuterol    90 MICROgram(s) HFA Inhaler 2 Puff(s) Inhalation every 6 hours PRN Bronchospasm  dextrose Oral Gel 15 Gram(s) Oral once PRN Blood Glucose LESS THAN 70 milliGRAM(s)/deciliter    Vital Signs Last 24 Hrs  T(C): 36.4 (04 Apr 2024 06:20), Max: 36.8 (03 Apr 2024 16:56)  T(F): 97.6 (04 Apr 2024 06:20), Max: 98.3 (03 Apr 2024 16:56)  HR: 80 (04 Apr 2024 06:20) (75 - 118)  BP: 134/89 (04 Apr 2024 06:20) (124/78 - 158/110)  BP(mean): --  RR: 20 (04 Apr 2024 06:20) (15 - 20)  SpO2: 95% (04 Apr 2024 06:20) (93% - 98%)    Parameters below as of 04 Apr 2024 06:20  Patient On (Oxygen Delivery Method): room air        I&O's Summary      PHYSICAL EXAM:    Constitutional: NAD, awake and alert, morbid obesity   HEENT: PERR, EOMI,  No oral cyananosis. short thick neck   Neck:  supple,  No JVD  Respiratory: Breath sounds with diminished breath sounds   Cardiovascular: S1 and S2, irreg rhythm, rate normal, no Murmurs, gallops or rubs  Gastrointestinal: Bowel Sounds present, soft, nontender, obese.   Extremities: No peripheral edema. No clubbing or cyanosis.  Vascular: 2+ peripheral pulses  Neurological: A/O x 3, no focal deficits  Musculoskeletal: no calf tenderness.  Skin: No rashes.      LABS: All Labs Reviewed:                          15.6   12.53 )-----------( 293      ( 04 Apr 2024 07:51 )             46.4                                      14.4   10.60 )-----------( 257      ( 03 Apr 2024 06:09 )             42.5                14.5   11.79 )-----------( 249      ( 02 Apr 2024 04:58 )             42.3                         15.0   13.05 )-----------( 268      ( 01 Apr 2024 14:28 )             44.4     04-03    141  |  110<H>  |  22  ----------------------------<  147<H>  4.1   |  24  |  1.17    Ca    8.9      03 Apr 2024 06:09  Phos  3.7     04-03  Mg     2.6     04-03    TPro  7.1  /  Alb  3.0<L>  /  TBili  0.8  /  DBili  0.2  /  AST  44<H>  /  ALT  259<H>  /  AlkPhos  76  04-03      02 Apr 2024 04:58    139    |  109    |  17     ----------------------------<  150    3.8     |  25     |  1.18   01 Apr 2024 14:28    136    |  107    |  20     ----------------------------<  167    4.1     |  22     |  1.33     Ca    8.8        02 Apr 2024 04:58  Ca    8.4        01 Apr 2024 14:28  Mg     2.0       01 Apr 2024 14:28    TPro  6.7    /  Alb  3.2    /  TBili  1.0    /  DBili  x      /  AST  95     /  ALT  334    /  AlkPhos  79     02 Apr 2024 04:58  TPro  6.7    /  Alb  3.2    /  TBili  0.8    /  DBili  x      /  AST  113    /  ALT  339    /  AlkPhos  77     01 Apr 2024 14:28    PT/INR - ( 02 Apr 2024 04:58 )   PT: 14.8 sec;   INR: 1.32 ratio         PTT - ( 02 Apr 2024 04:58 )  PTT:144.5 sec    Pro-Brain Natriuretic Peptide: 2441 pg/mL (04.01.24 @ 14:28)     Blood Culture:     04-01 @ 14:28  TSH: 2.28    - TroponinI hsT: <-13.28, <-16.76  RADIOLOGY/EKG:  afib with rapid rate 142 BPM   !:44 Pm     4/2/24 afib with   prologned  qt       < from: TTE Echo Complete w/o Contrast w/ Doppler (04.01.24 @ 18:53) >     The left ventricle is dilated with moderate, diffuse hypokinesis;   estimated left ventricular ejection fraction is 35-40 %.   Moderate concentric left ventricular hypertrophy.   The left atrium is severely dilated.   The IVC is dilated with decreased respiratory variation.   Moderate mitral regurgitation.   Mild tricuspid valve regurgitation.   Mild pulmonary hypertension.     Note: Tachycardia is noted during the exam.     Signature     ----------------------------------------------------------------    < end of copied text >      < from: SERA Complete w/Spect and Color (04.03.24 @ 16:08) >   Impression     Summary     A transesophageal echocardiogram was performed and included probe   placement in the esophagus posterior to the heart by the interpreting   physician, real-time image acquisition and interpretation utilizing 2-D,   color flow Doppler, pulsed wave and/or continuous wave with spectral   display. The patient received IV sedation. The procedure was monitored   with automatic blood pressure monitoring, telemetry tracings, and pulse   oximetry. The patient tolerated the procedure well and there were no   complications.     Reduced left ventricular systolic function. Estimated LVEF is 40%.   No evidence of right atrial, left atrial or left atrial appendage   thrombus.   Moderate mitral regurgitation.   Mild tricuspid regurgitation.   Mild pulmonic regurgitation.    < end of copied text >    INDICATION: Afib with RVR     Pt tolerated procedure well without complications.     SERA showed no evidence of MARSHALL thrombus- see echo report for all findings.     Received single 200J shock with conversion to NSR.     CHIEF COMPLAINT: shortness of breath for one week ,with cough not getting better     HPI:  39 y/o M w/ PMH of HTN, asthma, sleep apnea (non-adherent w/ CPAP), dyslipidemia, p/w cough. Patient states he has been coughing for approximately 1.5 week with shortness of breath activity  Cough is productive of brown sputum. Patient has been having intermittent fevers and chills. Patient has also been having associated dyspnea on exertion and palpitations. States he has been taking oral antibiotics and steroids outpatient without improvement ,from last monday .  Also feels like his abdomen is distended and c/o abdominal discomfort as well. Patient had 1 episode of vomiting on Thursday. Denies diarrhea / CP. , Patient was noted to be atrial fibrillation , with rapid rate , patient was started on cardizem drip  heart rate is still elevated dspite on 15 mg of Cardizem     Patient denies any chest pain ,  patient was able to sleep well last night after one week , feeling better     4/3/24: Pt denies cp.  Breathing and cough improving.  Tele: Afib 60's-130's.  Events overnight noted.  Pt noted to manuelito into the 40's while on cardizem gtt.  Gtt decreased to 5mg/hr however 2 sec pause noted.  Cardizem gtt DC'd and short acting cardizem ordered.  5am dose held this am as pt continued to have pauses on tele.  Pt asymptomatic.  4/4/24: Pt states he is feeling "1000% better today"  Denies cp or sob.  Cough improving.  S/P SERA/DCCV yesterday with successful conversion to RSR.  Tele: RSR with triplet noted          PAST MEDICAL & SURGICAL HISTORY:  HTN (hypertension)      HLD (hyperlipidemia)    nose surgery       Allergies    penicillins (Unknown)    Intolerances        SOCIAL HISTORY:   3-4 cigarettes daily, but plans to quit after today, etoh - 3-4 drinks on the weekend. However patient states just came back from a 12 day cruise and had about 10 drinks per day. Drugs - denies       FAMILY HISTORY:  FH: CAD (coronary artery disease)    FH: type 2 diabetes    FH: HTN (hypertension)     H/o CAD and kidney disease in maternal grandmother and maternal aunt     MEDICATIONS:Home Medications:  amLODIPine 10 mg oral tablet: 1 tab(s) orally (01 Apr 2024 18:20)  azithromycin 250 mg oral tablet: orally *** Take 2 tablets by mouth today, then take 1 tablet daily for 4 days *** (01 Apr 2024 18:20)  benzonatate 200 mg oral capsule: 1 cap(s) orally 3 times a day (01 Apr 2024 18:20)  doxycycline hyclate 100 mg oral tablet: 1 tab(s) orally 2 times a day (01 Apr 2024 18:20)  fexofenadine 180 mg oral tablet: 1 tab(s) orally once a day (01 Apr 2024 18:20)  losartan 50 mg oral tablet: 1 tab(s) orally once a day (01 Apr 2024 18:20)  methylPREDNISolone 4 mg oral tablet: orally *** take 6 tablets on day 1 and decrease by 1 tab each day for a total of 6 days*** (01 Apr 2024 18:20)  rosuvastatin 5 mg oral tablet: 1 tab(s) orally once a day (01 Apr 2024 16:59)    MEDICATIONS  (STANDING):  apixaban 5 milliGRAM(s) Oral every 12 hours  azithromycin   Tablet 500 milliGRAM(s) Oral every 24 hours  benzonatate 200 milliGRAM(s) Oral three times a day  cefTRIAXone Injectable. 2000 milliGRAM(s) IV Push every 24 hours  chlorhexidine 4% Liquid 1 Application(s) Topical <User Schedule>  dexAMETHasone     Tablet 6 milliGRAM(s) Oral daily  dextrose 5%. 1000 milliLiter(s) (50 mL/Hr) IV Continuous <Continuous>  dextrose 5%. 1000 milliLiter(s) (100 mL/Hr) IV Continuous <Continuous>  dextrose 50% Injectable 25 Gram(s) IV Push once  dextrose 50% Injectable 12.5 Gram(s) IV Push once  dextrose 50% Injectable 25 Gram(s) IV Push once  diltiazem    Tablet 30 milliGRAM(s) Oral every 8 hours  glucagon  Injectable 1 milliGRAM(s) IntraMuscular once  guaiFENesin Oral Liquid (Sugar-Free) 200 milliGRAM(s) Oral every 8 hours  influenza   Vaccine 0.5 milliLiter(s) IntraMuscular once  insulin lispro (ADMELOG) corrective regimen sliding scale   SubCutaneous at bedtime  insulin lispro (ADMELOG) corrective regimen sliding scale   SubCutaneous three times a day before meals  lactobacillus acidophilus 1 Tablet(s) Oral three times a day with meals  metoprolol tartrate 25 milliGRAM(s) Oral two times a day  tiotropium 2.5 MICROgram(s) Inhaler 2 Puff(s) Inhalation daily    MEDICATIONS  (PRN):  albuterol    90 MICROgram(s) HFA Inhaler 2 Puff(s) Inhalation every 6 hours PRN Bronchospasm  dextrose Oral Gel 15 Gram(s) Oral once PRN Blood Glucose LESS THAN 70 milliGRAM(s)/deciliter    Vital Signs Last 24 Hrs  T(C): 36.4 (04 Apr 2024 06:20), Max: 36.8 (03 Apr 2024 16:56)  T(F): 97.6 (04 Apr 2024 06:20), Max: 98.3 (03 Apr 2024 16:56)  HR: 80 (04 Apr 2024 06:20) (75 - 118)  BP: 134/89 (04 Apr 2024 06:20) (124/78 - 158/110)  BP(mean): --  RR: 20 (04 Apr 2024 06:20) (15 - 20)  SpO2: 95% (04 Apr 2024 06:20) (93% - 98%)    Parameters below as of 04 Apr 2024 06:20  Patient On (Oxygen Delivery Method): room air        I&O's Summary      PHYSICAL EXAM:    Constitutional: NAD, awake and alert, morbid obesity   HEENT: PERR, EOMI,  No oral cyananosis. short thick neck   Neck:  supple,  No JVD  Respiratory: Breath sounds with diminished breath sounds   Cardiovascular: S1 and S2, regular rate and rhythm, no Murmurs, gallops or rubs  Gastrointestinal: Bowel Sounds present, soft, nontender, obese.   Extremities: No peripheral edema. No clubbing or cyanosis.  Vascular: 2+ peripheral pulses  Neurological: A/O x 3, no focal deficits  Musculoskeletal: no calf tenderness.  Skin: No rashes.      LABS: All Labs Reviewed:                          15.6   12.53 )-----------( 293      ( 04 Apr 2024 07:51 )             46.4                                      14.4   10.60 )-----------( 257      ( 03 Apr 2024 06:09 )             42.5                14.5   11.79 )-----------( 249      ( 02 Apr 2024 04:58 )             42.3                         15.0   13.05 )-----------( 268      ( 01 Apr 2024 14:28 )             44.4     04-03    141  |  110<H>  |  22  ----------------------------<  147<H>  4.1   |  24  |  1.17    Ca    8.9      03 Apr 2024 06:09  Phos  3.7     04-03  Mg     2.6     04-03    TPro  7.1  /  Alb  3.0<L>  /  TBili  0.8  /  DBili  0.2  /  AST  44<H>  /  ALT  259<H>  /  AlkPhos  76  04-03      02 Apr 2024 04:58    139    |  109    |  17     ----------------------------<  150    3.8     |  25     |  1.18   01 Apr 2024 14:28    136    |  107    |  20     ----------------------------<  167    4.1     |  22     |  1.33     Ca    8.8        02 Apr 2024 04:58  Ca    8.4        01 Apr 2024 14:28  Mg     2.0       01 Apr 2024 14:28    TPro  6.7    /  Alb  3.2    /  TBili  1.0    /  DBili  x      /  AST  95     /  ALT  334    /  AlkPhos  79     02 Apr 2024 04:58  TPro  6.7    /  Alb  3.2    /  TBili  0.8    /  DBili  x      /  AST  113    /  ALT  339    /  AlkPhos  77     01 Apr 2024 14:28    PT/INR - ( 02 Apr 2024 04:58 )   PT: 14.8 sec;   INR: 1.32 ratio         PTT - ( 02 Apr 2024 04:58 )  PTT:144.5 sec    Pro-Brain Natriuretic Peptide: 2441 pg/mL (04.01.24 @ 14:28)     Blood Culture:     04-01 @ 14:28  TSH: 2.28    - TroponinI hsT: <-13.28, <-16.76  RADIOLOGY/EKG:  afib with rapid rate 142 BPM   !:44 Pm     4/2/24 afib with   prologned  qt       < from: TTE Echo Complete w/o Contrast w/ Doppler (04.01.24 @ 18:53) >     The left ventricle is dilated with moderate, diffuse hypokinesis;   estimated left ventricular ejection fraction is 35-40 %.   Moderate concentric left ventricular hypertrophy.   The left atrium is severely dilated.   The IVC is dilated with decreased respiratory variation.   Moderate mitral regurgitation.   Mild tricuspid valve regurgitation.   Mild pulmonary hypertension.     Note: Tachycardia is noted during the exam.     Signature     ----------------------------------------------------------------    < end of copied text >      < from: SERA Complete w/Spect and Color (04.03.24 @ 16:08) >   Impression     Summary     A transesophageal echocardiogram was performed and included probe   placement in the esophagus posterior to the heart by the interpreting   physician, real-time image acquisition and interpretation utilizing 2-D,   color flow Doppler, pulsed wave and/or continuous wave with spectral   display. The patient received IV sedation. The procedure was monitored   with automatic blood pressure monitoring, telemetry tracings, and pulse   oximetry. The patient tolerated the procedure well and there were no   complications.     Reduced left ventricular systolic function. Estimated LVEF is 40%.   No evidence of right atrial, left atrial or left atrial appendage   thrombus.   Moderate mitral regurgitation.   Mild tricuspid regurgitation.   Mild pulmonic regurgitation.    < end of copied text >    INDICATION: Afib with RVR     Pt tolerated procedure well without complications.     SERA showed no evidence of MARSHALL thrombus- see echo report for all findings.     Received single 200J shock with conversion to NSR.

## 2024-04-04 NOTE — PROGRESS NOTE ADULT - PROBLEM SELECTOR PLAN 3
·  Problem: Acute systolic congestive heart failure.  ·  Recommendation: as above, pt received one dose IV lasix and digoxin 0.25 mg IV x 2.  .  Continue BB and titrate as needed.  Try and avoid CCB in the setting of LVSD but may need to use for short term use to assist with rate control. ·  Problem: Acute systolic congestive heart failure.  ·  Recommendation: as above, pt received one dose IV lasix and digoxin 0.25 mg IV x 2.  .  S/P SERA/DCCV as above.  SOB improved.  Continue BB and titrate as needed. Will DC cardizem.  Recommend OP ischemic eval and follow up echo to reeval LVF. likely tachy mediated s/p SERA/DCCV as above.  SOB improved.  Continue BB.   will need to reassess LVEF, ischemic evaluation

## 2024-04-04 NOTE — PROGRESS NOTE ADULT - PROBLEM SELECTOR PLAN 4
·  Problem: Hypertension.  ·  Recommendation: Uncontrolled.  Continue BB and titrate as needed.  Cardizem gtt DC'd and changed to short acting PO cardizem.  Pt takes losartan at home.  Would resume ARB once HR controlled if BP remains elevated ·  Problem: Hypertension.  ·  Recommendation: Improving.  Continue BB and titrate as needed.  DC cardizem as above.  Pt takes losartan at home.  Would resume ARB once HR controlled if BP remains elevated ·  Problem: Hypertension.  ·  Recommendation: Improving but remains mildly elevated.  Continue BB and titrate as needed.  DC cardizem as above.  Pt takes losartan at home.  Will resume losartan at lower dose 25mg PO QD (pt takes 50mg at home). Improved

## 2024-04-04 NOTE — DISCHARGE NOTE PROVIDER - CARE PROVIDER_API CALL
Dada Arguello  Cardiovascular Disease  [address], [address]  [place] [address]  Phone: [phone]  Fax: [phone]  Scheduled Appointment: 04/10/2024 02:30 PM    Abimbola Bauman  Cardiac Electrophysiology  [address] [place] [address]  Phone: [phone]  Fax: [phone]  Scheduled Appointment: 05/13/2024 02:00 PM

## 2024-04-04 NOTE — PROGRESS NOTE ADULT - PROBLEM SELECTOR PROBLEM 5
Acute systolic congestive heart failure
Hyperlipidemia
Hyperlipidemia

## 2024-04-04 NOTE — PROGRESS NOTE ADULT - PROBLEM SELECTOR PLAN 2
·  Problem: Atrial fibrillation.   ·  Recommendation: New onset afib with RVR in the setting of  pneumonia covid + , underlying hypertension hypertensive heart disease ,morbid obesity.  IV cardizem DC'd due to bradycardia 40's and pauses.  S/P SERA/DCCV 4/3/24 with successful conversion to RSR.  Currently in          .  Will DC short acting cardizem in light of new LVSD.  Continue BB and titrate as needed.  Will need MCOT on DC and follow up with EP     .  TSH WNL.  Maintain K>4, Mg>2    .  Recommend OP sleep study as OP , encourage weight loss ·  Problem: Atrial fibrillation.   ·  Recommendation: New onset afib with RVR in the setting of  pneumonia covid + , underlying hypertension hypertensive heart disease ,morbid obesity.  IV cardizem DC'd due to bradycardia 40's and pauses.  S/P SERA/DCCV 4/3/24 with successful conversion to RSR.  Currently in normal sinus rhythm.  Will DC short acting cardizem in light of new LVSD.  Continue BB and titrate as needed.  Triplet noted on tele.  Recommend OP ischemic eval.  Will need MCOT on DC and follow up with EP     .  TSH WNL.  Maintain K>4, Mg>2    .  Pt with history or PAO, has CPAP (not compliant).  Discussed importance of using CPAP regularly.    .  Encourage weight loss and decrease ETOH intake ·  Problem: Atrial fibrillation.   ·  Recommendation: New onset afib with RVR in the setting of  pneumonia covid + , underlying hypertension hypertensive heart disease ,morbid obesity.  IV cardizem DC'd due to bradycardia 40's and pauses.  S/P SERA/DCCV 4/3/24 with successful conversion to RSR.  Currently in normal sinus rhythm.  Will DC short acting cardizem in light of new LVSD.  Continue eliquis and BB (titrate as needed).  Triplet noted on tele.  Recommend OP ischemic eval.  Will need MCOT on DC and follow up with EP     .  TSH WNL.  Maintain K>4, Mg>2    .  Pt with history or PAO, has CPAP (not compliant).  Discussed importance of using CPAP regularly.    .  Encourage weight loss and decrease ETOH intake New onset afib with RVR in the setting of  pneumonia covid + , underlying hypertension hypertensive heart disease ,morbid obesity.  IV cardizem DC'd due to bradycardia 40's and pauses.  S/P SERA/DCCV 4/3/24 with successful conversion to RSR.  Currently in normal sinus rhythm.  Continue eliquis and BB (titrate as needed).  Triplet noted on tele.  Recommend OP ischemic eval.  Will need MCOT on DC and follow up with EP     .  TSH WNL.  Maintain K>4, Mg>2    .  Pt with history or PAO, has CPAP (not compliant).  Discussed importance of using CPAP regularly.    .  Encourage weight loss and decrease ETOH intake

## 2024-04-04 NOTE — PROGRESS NOTE ADULT - SUBJECTIVE AND OBJECTIVE BOX
Patient is a 38y old  Male who presents with a chief complaint of cough (04 Apr 2024 08:20)      Subective:  Feels much better  S/P SERA cardioversion    PAST MEDICAL & SURGICAL HISTORY:  HTN (hypertension)      HLD (hyperlipidemia)          MEDICATIONS  (STANDING):  apixaban 5 milliGRAM(s) Oral every 12 hours  azithromycin   Tablet 500 milliGRAM(s) Oral every 24 hours  benzonatate 200 milliGRAM(s) Oral three times a day  cefTRIAXone Injectable. 2000 milliGRAM(s) IV Push every 24 hours  chlorhexidine 4% Liquid 1 Application(s) Topical <User Schedule>  dexAMETHasone     Tablet 6 milliGRAM(s) Oral daily  dextrose 5%. 1000 milliLiter(s) (50 mL/Hr) IV Continuous <Continuous>  dextrose 5%. 1000 milliLiter(s) (100 mL/Hr) IV Continuous <Continuous>  dextrose 50% Injectable 12.5 Gram(s) IV Push once  dextrose 50% Injectable 25 Gram(s) IV Push once  dextrose 50% Injectable 25 Gram(s) IV Push once  glucagon  Injectable 1 milliGRAM(s) IntraMuscular once  guaiFENesin Oral Liquid (Sugar-Free) 200 milliGRAM(s) Oral every 8 hours  influenza   Vaccine 0.5 milliLiter(s) IntraMuscular once  insulin lispro (ADMELOG) corrective regimen sliding scale   SubCutaneous three times a day before meals  insulin lispro (ADMELOG) corrective regimen sliding scale   SubCutaneous at bedtime  lactobacillus acidophilus 1 Tablet(s) Oral three times a day with meals  losartan 25 milliGRAM(s) Oral daily  metoprolol tartrate 25 milliGRAM(s) Oral two times a day  tiotropium 2.5 MICROgram(s) Inhaler 2 Puff(s) Inhalation daily    MEDICATIONS  (PRN):  albuterol    90 MICROgram(s) HFA Inhaler 2 Puff(s) Inhalation every 6 hours PRN Bronchospasm  dextrose Oral Gel 15 Gram(s) Oral once PRN Blood Glucose LESS THAN 70 milliGRAM(s)/deciliter      REVIEW OF SYSTEMS:    RESPIRATORY: No shortness of breath  CARDIOVASCULAR: No chest pain  All other review of systems is negative unless indicated above.    Vital Signs Last 24 Hrs  T(C): 36.3 (04 Apr 2024 08:38), Max: 36.8 (03 Apr 2024 16:56)  T(F): 97.3 (04 Apr 2024 08:38), Max: 98.3 (03 Apr 2024 16:56)  HR: 78 (04 Apr 2024 08:38) (75 - 118)  BP: 134/89 (04 Apr 2024 08:38) (124/78 - 158/110)  BP(mean): --  RR: 18 (04 Apr 2024 08:38) (15 - 20)  SpO2: 96% (04 Apr 2024 08:38) (93% - 98%)    Parameters below as of 04 Apr 2024 08:38  Patient On (Oxygen Delivery Method): room air        PHYSICAL EXAM:    Constitutional: NAD, well-developed  Respiratory: CTAB  Cardiovascular: S1 and S2  Gastrointestinal: BS+, soft, NT/ND  Extremities: No peripheral edema  Psychiatric: Normal mood, normal affect    LABS:                        15.6   12.53 )-----------( 293      ( 04 Apr 2024 07:51 )             46.4     04-04    139  |  109<H>  |  31<H>  ----------------------------<  159<H>  4.1   |  23  |  1.37<H>    Ca    9.4      04 Apr 2024 07:51  Phos  3.7     04-03  Mg     2.6     04-04    TPro  7.2  /  Alb  3.1<L>  /  TBili  0.6  /  DBili  0.2  /  AST  23  /  ALT  193<H>  /  AlkPhos  75  04-04    PTT - ( 04 Apr 2024 07:51 )  PTT:35.3 sec  LIVER FUNCTIONS - ( 04 Apr 2024 07:51 )  Alb: 3.1 g/dL / Pro: 7.2 gm/dL / ALK PHOS: 75 U/L / ALT: 193 U/L / AST: 23 U/L / GGT: x             RADIOLOGY & ADDITIONAL STUDIES:

## 2024-04-04 NOTE — PHARMACOTHERAPY INTERVENTION NOTE - COMMENTS
Eliquis PA started and approved - CaseId:[id number];Status:Approved;
Medication history complete, reviewed medications with patient and confirmed with doctor first med hx.

## 2024-04-04 NOTE — PROVIDER CONTACT NOTE (OTHER) - ASSESSMENT
VSS. AFib 80s currently. Pt asymptomatic
VSS, see flowsheet.
VSS otherwise, remains Afib.
Pt currently requiring 3L O2. HR 100s-110s on cardizem gtt. BP stable.
VSS, see flowsheet.

## 2024-04-04 NOTE — DISCHARGE NOTE PROVIDER - NSDCMRMEDTOKEN_GEN_ALL_CORE_FT
amLODIPine 10 mg oral tablet: 1 tab(s) orally  azithromycin 250 mg oral tablet: orally *** Take 2 tablets by mouth today, then take 1 tablet daily for 4 days ***  benzonatate 200 mg oral capsule: 1 cap(s) orally 3 times a day  doxycycline hyclate 100 mg oral tablet: 1 tab(s) orally 2 times a day  fexofenadine 180 mg oral tablet: 1 tab(s) orally once a day  losartan 50 mg oral tablet: 1 tab(s) orally once a day  methylPREDNISolone 4 mg oral tablet: orally *** take 6 tablets on day 1 and decrease by 1 tab each day for a total of 6 days***  rosuvastatin 5 mg oral tablet: 1 tab(s) orally once a day   albuterol 90 mcg/inh inhalation aerosol: 2 puff(s) inhaled every 6 hours as needed for  shortness of breath and/or wheezing  amLODIPine 10 mg oral tablet: 1 tab(s) orally  apixaban 5 mg oral tablet: 1 tab(s) orally every 12 hours  benzonatate 200 mg oral capsule: 1 cap(s) orally 3 times a day  dexAMETHasone 6 mg oral tablet: 1 tab(s) orally once a day  fexofenadine 180 mg oral tablet: 1 tab(s) orally once a day  losartan 50 mg oral tablet: 1 tab(s) orally once a day  metoprolol succinate 50 mg oral capsule, extended release: 1 cap(s) orally once a day  rosuvastatin 5 mg oral tablet: 1 tab(s) orally once a day   albuterol 90 mcg/inh inhalation aerosol: 2 puff(s) inhaled every 6 hours as needed for  shortness of breath and/or wheezing  apixaban 5 mg oral tablet: 1 tab(s) orally every 12 hours  benzonatate 200 mg oral capsule: 1 cap(s) orally 3 times a day  cefuroxime 500 mg oral tablet: 1 tab(s) orally 2 times a day  dexAMETHasone 6 mg oral tablet: 1 tab(s) orally once a day  fexofenadine 180 mg oral tablet: 1 tab(s) orally once a day  losartan 25 mg oral tablet: 1 tab(s) orally once a day  metoprolol succinate 50 mg oral capsule, extended release: 1 cap(s) orally once a day  rosuvastatin 5 mg oral tablet: 1 tab(s) orally once a day

## 2024-04-04 NOTE — DISCHARGE NOTE PROVIDER - PROVIDER TOKENS
PROVIDER:[TOKEN:[[id number]],SCHEDULEDAPPT:[04/10/2024],SCHEDULEDAPPTTIME:[02:30 PM]],PROVIDER:[TOKEN:[[id number]],SCHEDULEDAPPT:[05/13/2024],SCHEDULEDAPPTTIME:[02:00 PM]]

## 2024-04-04 NOTE — PROVIDER CONTACT NOTE (OTHER) - BACKGROUND
Pt admitted for afib RVR, COVID, Flu, and PNA. S/p cardizem gtt. On Hep gtt. Planned to have cardioversion when stable.
Patient admitted for afib rvr. Pt had pauses and bradycardia while on cardizem gtt which was d/c'd 4/3 early AM. Patient cardioverted 4/3 and NSR since.
Pt admitted for afib RVR. On cardizem gtt @15. On hep gtt @24
Rate decreased from 15 to 5mL/hr this shift. Pt HR remains sustaining below 100.

## 2024-04-04 NOTE — DISCHARGE NOTE PROVIDER - NSDCFUSCHEDAPPT_GEN_ALL_CORE_FT
Jocelyne Baum  [facility]  CARDIOLOGY [address]  Scheduled Appointment: 04/10/2024    Abimbola Bauman  [facility]  ELECTROPH [address]  Scheduled Appointment: 05/13/2024

## 2024-04-04 NOTE — PROGRESS NOTE ADULT - PROBLEM SELECTOR PLAN 5
·  Problem: Hyperlipidemia.   ·  Recommendation: continue rosuvastatin 5 mg po daily. continue rosuvastatin 5 mg po daily.

## 2024-04-04 NOTE — DISCHARGE NOTE PROVIDER - NSDCFUADDAPPT_GEN_ALL_CORE_FT
Follow-up appointment with Dr. Arguello on Wednesday 4/10/24 at 2:30pm Follow-up appointment with Dr. Arguello on Wednesday 4/10/24 at 2:30pm    Follow up with Cardiac Electrophysiology, Dr. Bauman, on 5/13/24 at 2pm.

## 2024-04-04 NOTE — PROGRESS NOTE ADULT - NS ATTEND AMEND GEN_ALL_CORE FT
agree with A/P as above  38yp with new onset CM possibly tachy induced vs viral etiology, pt with flu and covid +, AF RVR  agree with bessy dccv when viral symptoms improving   needs ischemic work up  oac  gdmt for chf  should follow with EP and cardiology as outpatient   I spent a total of 35 minutes on the encounter, including chart review, evaluating and discussing treatment options with the patient, as well as counseling and coordination as stated above.
38 year old morbidly obese pt that presents with cough and complaints of fever who was found to be in new onset AT Fib with RVR in the setting of +COVID PNA and parainfluenza.  He has acute Heart Failure, EF 35-40%, may be tachy induced.   s/p SERA guided DCCV  remains in SR  cont eliquis for now  CHF medical therapy  MCT  follow with Cardiology and EP outpatient  I spent a total of 35 minutes on the encounter, including chart review, evaluating and discussing treatment options with the patient, as well as counseling and coordination as stated above.
agree with a/p as above  I spent a total of 35 minutes on the encounter, including chart review, evaluating and discussing treatment options with the patient, as well as counseling and coordination as stated above.
Plan of care discussed with NP. Agree with plan of care.
Agree with the above. Pt remains in NSR post cardioversion. Stable for DC from cardiac standpoint  will need follow up in 1-2 weeks

## 2024-04-04 NOTE — PROGRESS NOTE ADULT - SUBJECTIVE AND OBJECTIVE BOX
Date of service: 04-04-24 @ 11:03    pt seen and examined  feels better  on RA  has cough    ROS: no fever or chills; denies dizziness, no HA, no abdominal pain, no diarrhea or constipation; no dysuria, no urinary frequency, no legs pain, no rashes      MEDICATIONS  (STANDING):  apixaban 5 milliGRAM(s) Oral every 12 hours  azithromycin   Tablet 500 milliGRAM(s) Oral every 24 hours  benzonatate 200 milliGRAM(s) Oral three times a day  cefTRIAXone Injectable. 2000 milliGRAM(s) IV Push every 24 hours  chlorhexidine 4% Liquid 1 Application(s) Topical <User Schedule>  dexAMETHasone     Tablet 6 milliGRAM(s) Oral daily  dextrose 5%. 1000 milliLiter(s) (50 mL/Hr) IV Continuous <Continuous>  dextrose 5%. 1000 milliLiter(s) (100 mL/Hr) IV Continuous <Continuous>  dextrose 50% Injectable 12.5 Gram(s) IV Push once  dextrose 50% Injectable 25 Gram(s) IV Push once  dextrose 50% Injectable 25 Gram(s) IV Push once  glucagon  Injectable 1 milliGRAM(s) IntraMuscular once  guaiFENesin Oral Liquid (Sugar-Free) 200 milliGRAM(s) Oral every 8 hours  influenza   Vaccine 0.5 milliLiter(s) IntraMuscular once  insulin lispro (ADMELOG) corrective regimen sliding scale   SubCutaneous three times a day before meals  insulin lispro (ADMELOG) corrective regimen sliding scale   SubCutaneous at bedtime  lactobacillus acidophilus 1 Tablet(s) Oral three times a day with meals  losartan 25 milliGRAM(s) Oral daily  metoprolol tartrate 25 milliGRAM(s) Oral two times a day  tiotropium 2.5 MICROgram(s) Inhaler 2 Puff(s) Inhalation daily    Vital Signs Last 24 Hrs  T(C): 36.3 (04 Apr 2024 08:38), Max: 36.8 (03 Apr 2024 16:56)  T(F): 97.3 (04 Apr 2024 08:38), Max: 98.3 (03 Apr 2024 16:56)  HR: 78 (04 Apr 2024 08:38) (75 - 118)  BP: 134/89 (04 Apr 2024 08:38) (124/78 - 158/110)  BP(mean): --  RR: 18 (04 Apr 2024 08:38) (15 - 20)  SpO2: 96% (04 Apr 2024 08:38) (93% - 98%)    Parameters below as of 04 Apr 2024 08:38  Patient On (Oxygen Delivery Method): room air        PE:  Constitutional: NAD  HEENT: NC/AT, EOMI, PERRLA, conjunctivae clear; ears and nose atraumatic; pharynx benign  Neck: supple; thyroid not palpable  Back: no tenderness  Respiratory: decreased breath sounds  Cardiovascular: S1S2 regular, no murmurs  Abdomen: soft, not tender, not distended, positive BS; liver and spleen WNL  Genitourinary: no suprapubic tenderness  Lymphatic: no LN palpable  Musculoskeletal: no muscle tenderness, no joint swelling or tenderness  Extremities: no pedal edema  Neurological/ Psychiatric: AxOx3, Judgement and insight normal;  moving all extremities  Skin: no rashes; no palpable lesions    Labs: all available labs reviewed                                   15.6   12.53 )-----------( 293      ( 04 Apr 2024 07:51 )             46.4     04-04    139  |  109<H>  |  31<H>  ----------------------------<  159<H>  4.1   |  23  |  1.37<H>    Ca    9.4      04 Apr 2024 07:51  Phos  3.7     04-03  Mg     2.6     04-04    TPro  7.2  /  Alb  3.1<L>  /  TBili  0.6  /  DBili  0.2  /  AST  23  /  ALT  193<H>  /  AlkPhos  75  04-04      Urinalysis Basic - ( 02 Apr 2024 04:58 )    Color: x / Appearance: x / SG: x / pH: x  Gluc: 150 mg/dL / Ketone: x  / Bili: x / Urobili: x   Blood: x / Protein: x / Nitrite: x   Leuk Esterase: x / RBC: x / WBC x   Sq Epi: x / Non Sq Epi: x / Bacteria: x    Culture - Urine (04.01.24 @ 15:42)   Specimen Source: Clean Catch None  Culture Results:   No growth  Culture - Blood (04.01.24 @ 15:07)   Specimen Source: .Blood Blood-Peripheral  Culture Results:   No growth at 24 hours  Culture - Blood (04.01.24 @ 14:28)   Specimen Source: .Blood Blood-Peripheral  Culture Results:   No growth at 24 hours    Radiology: all available radiological tests reviewed  < from: US Abdomen Upper Quadrant Right (04.02.24 @ 16:24) >    ACC: [id number] EXAM:  US ABDOMEN RT UPR QUADRANT   ORDERED BY: SYED GARCIA     PROCEDURE DATE:  04/02/2024          INTERPRETATION:  CLINICAL INFORMATION: Right upper quadrant abdominal   pain.    COMPARISON: 4/1/2024.    TECHNIQUE: Sonography of the right upper quadrant. Evaluation limited   secondary to portable technique.    FINDINGS:  Liver: The liver is prominent in size measuring 24 cm. Increased   echotexture suggests hepatic steatosis. No focal hepatic masses.  Bile ducts: Normal caliber. Common bile duct measures 3 mm.  Gallbladder: No gallstones, gallbladder wall thickening or   pericholecystic fluid.  Pancreas: Poorly visualized.  Right kidney: 11.7 cm. No hydronephrosis.  Ascites: None.  IVC: Visualized portions are within normallimits.    IMPRESSION:  No gallstones, gallbladder wall thickening or pericholecystic fluid.      Advanced directives addressed: full resuscitation

## 2024-04-04 NOTE — PROVIDER CONTACT NOTE (OTHER) - REASON
Cardiology Consult
Gastroenterology Consult
Pt HR sustaining 80s, stop cardizem gtt
Pt RVP came back positive for COVID and parainfluenza 3
Patient manuelito into 40s while asleep
Pt had 4.56sec pause and 3.59sec pause. HR manuelito down to 29.
Patient HR manuelito down to 32

## 2024-04-04 NOTE — PROGRESS NOTE ADULT - ASSESSMENT
38 year old morbidly obese pt that presents with cough and complaints of fever who was found to be in new onset AT Fib with RVR in the setting of +COVID PNA and parainfluenza.  He has acute Heart Failure, EF 35-40%, may be tachy induced.     A/P: New onset AF with RVR in setting of covid PNA and parainfluenza  Now s/p SERA/DCCV yesterday 4/3, he remains in NSR  Cardizem discontinued due to systolic dysfunction  Continue Metoprolol 25 mg PO BID, up titrate as needed  Losartan was resumed  LVEF 35-40%, GDMT  Continue Eliquis 5mg PO BID  Covid treatment as per medicine team  Keep electrolytes repleted, K>4, Mg>2  MCOT/Zio on discharge with outpatient EP follow up in 4 weeks.  Ischemic workup as outpatient.  Plan d/w Dr. Bauman, patient, RN, cardiology team

## 2024-04-04 NOTE — DISCHARGE NOTE PROVIDER - NSDCCPCAREPLAN_GEN_ALL_CORE_FT
PRINCIPAL DISCHARGE DIAGNOSIS  Diagnosis: Atrial fibrillation  Assessment and Plan of Treatment:       SECONDARY DISCHARGE DIAGNOSES  Diagnosis: Pneumonia  Assessment and Plan of Treatment:     Diagnosis: Acute systolic congestive heart failure  Assessment and Plan of Treatment:      PRINCIPAL DISCHARGE DIAGNOSIS  Diagnosis: Atrial fibrillation  Assessment and Plan of Treatment: Atrial fibrillation (A-fib) is an irregular heartbeat. It reduces your heart's ability to pump blood through your body. A-fib may come and go, or it may be a long-term condition. You had a successful Cardioversion, which converted your atrial fibrillation to a normal rhythm. Cardioversion is a medical procedure that uses quick, low-energy shocks to restore a regular heart rhythm.  Please continue Toprol 50 mg once per day and Eliquis 5 mg twice per day.   You had a MCOT placed upon discharge. Mobile Cardiac Outpatient Telemetry (MCOT) is a small device that is applied to the chest to monitor and record your heart rhythm. Please follow up with Cardiac Electrophysiology, Dr. Bauman, on 5/13/24 at 2pm to review MCOT findings.      SECONDARY DISCHARGE DIAGNOSES  Diagnosis: Pneumonia  Assessment and Plan of Treatment: Please continue Ceftin 500 mg twice per day for 5 more days  Please continue Decadron 6 mg once per day for 5 more days    Diagnosis: Acute systolic congestive heart failure  Assessment and Plan of Treatment: Please follow up with the Cardiologist on scheduled appointment on 4/10/24 for further workup and management.    Diagnosis: Transaminitis  Assessment and Plan of Treatment: Transaminitis is the medical term for elevated liver enzymes. These enzymes can be elevated in setting of alcohol use and/or viruses such as COVID-19. Your enzymes have been improving while in the hospital. Please obtain repeat labs during your outpatient follow up to ensure your levels returned to normal.     PRINCIPAL DISCHARGE DIAGNOSIS  Diagnosis: Atrial fibrillation  Assessment and Plan of Treatment: Atrial fibrillation (A-fib) is an irregular heartbeat. It reduces your heart's ability to pump blood through your body. A-fib may come and go, or it may be a long-term condition. You had a successful Cardioversion, which converted your atrial fibrillation to a normal rhythm. Cardioversion is a medical procedure that uses quick, low-energy shocks to restore a regular heart rhythm.  Please continue Toprol 50 mg once per day and Eliquis 5 mg twice per day.   You had a MCOT placed upon discharge. Mobile Cardiac Outpatient Telemetry (MCOT) is a small device that is applied to the chest to monitor and record your heart rhythm. Please follow up with Cardiac Electrophysiology, Dr. Bauman, on 5/13/24 at 2pm to review MCOT findings.      SECONDARY DISCHARGE DIAGNOSES  Diagnosis: Pneumonia  Assessment and Plan of Treatment: Please continue Ceftin 500 mg twice per day for 5 more days  Please continue Decadron 6 mg once per day for 5 more days    Diagnosis: Acute systolic congestive heart failure  Assessment and Plan of Treatment: Please follow up with the Cardiologist on scheduled appointment on 4/10/24 for further workup and management.    Diagnosis: Transaminitis  Assessment and Plan of Treatment: Transaminitis is the medical term for elevated liver enzymes. These enzymes can be elevated in setting of alcohol use and/or viruses such as COVID-19. Your enzymes have been improving while in the hospital. Please obtain repeat labs during your outpatient follow up to ensure your levels returned to normal.    Diagnosis: Hypertension  Assessment and Plan of Treatment: We decreased your Losartan to 25 mg once per day.  Please HOLD your Amlodipine upon discharge.    Diagnosis: Diabetes mellitus  Assessment and Plan of Treatment: You have a diagnosis of type 2 diabetes (sometimes called type 2 "diabetes mellitus"). This is a disorder that disrupts the way your body uses sugar. All the cells in your body need sugar to work normally. Sugar gets into the cells with the help of a hormone called insulin. If there is not enough insulin, or if the body stops responding to insulin, sugar builds up in the blood. That is what happens to people with diabetes. Type 2 diabetes usually causes no symptoms. When symptoms do occur, they include the need to urinate often, intense thirst and blurry vision. Even though type 2 diabetes might not make you feel sick, it can cause serious problems over time, if it is not treated. The disorder can lead to heart attacks, strokes, kidney disease, vision problems (or even blindness), pain or loss of feeling in the hands and feet, and the need to have fingers, toes, or other body parts removed (amputated). Contiue lifestyle modifications upon discharge, including healthy diet, maintain an active lifestyle, lose weight, avoid smoking/drinking alcohol. Please see your PCP to discuss different treatment options in addition to the lifestyle modifications.     PRINCIPAL DISCHARGE DIAGNOSIS  Diagnosis: Atrial fibrillation  Assessment and Plan of Treatment: Atrial fibrillation (A-fib) is an irregular heartbeat. It reduces your heart's ability to pump blood through your body. A-fib may come and go, or it may be a long-term condition. You had a successful Cardioversion, which converted your atrial fibrillation to a normal rhythm. Cardioversion is a medical procedure that uses quick, low-energy shocks to restore a regular heart rhythm.  Please continue Toprol 50 mg once per day and Eliquis 5 mg twice per day.   You had a MCOT placed upon discharge. Mobile Cardiac Outpatient Telemetry (MCOT) is a small device that is applied to the chest to monitor and record your heart rhythm. Please follow up with Cardiac Electrophysiology, Dr. Bauman, on 5/13/24 at 2pm to review MCOT findings.      SECONDARY DISCHARGE DIAGNOSES  Diagnosis: Pneumonia  Assessment and Plan of Treatment: Please continue Ceftin 500 mg twice per day for 5 more days  Please continue Decadron 6 mg once per day for 5 more days    Diagnosis: Acute systolic congestive heart failure  Assessment and Plan of Treatment: Please follow up with the Cardiologist on scheduled appointment on 4/10/24 for further workup and management.    Diagnosis: Transaminitis  Assessment and Plan of Treatment: Transaminitis is the medical term for elevated liver enzymes. These enzymes can be elevated in setting of alcohol use and/or viruses such as COVID-19. Your enzymes have been improving while in the hospital. Please obtain repeat labs during your outpatient follow up to ensure your levels returned to normal.    Diagnosis: Hypertension  Assessment and Plan of Treatment: We decreased your Losartan to 25 mg once per day.  Please HOLD your Amlodipine upon discharge.    Diagnosis: Diabetes mellitus  Assessment and Plan of Treatment: You have a diagnosis of type 2 diabetes (sometimes called type 2 "diabetes mellitus"). This is a disorder that disrupts the way your body uses sugar. All the cells in your body need sugar to work normally. Sugar gets into the cells with the help of a hormone called insulin. If there is not enough insulin, or if the body stops responding to insulin, sugar builds up in the blood. That is what happens to people with diabetes. Type 2 diabetes usually causes no symptoms. When symptoms do occur, they include the need to urinate often, intense thirst and blurry vision. Even though type 2 diabetes might not make you feel sick, it can cause serious problems over time, if it is not treated. The disorder can lead to heart attacks, strokes, kidney disease, vision problems (or even blindness), pain or loss of feeling in the hands and feet, and the need to have fingers, toes, or other body parts removed (amputated). Continue lifestyle modifications upon discharge, including healthy diet, active lifestyle, weight loss, avoid smoking/drinking alcohol. Please see your PCP to discuss different treatment options in addition to the lifestyle modifications.

## 2024-04-04 NOTE — PROVIDER CONTACT NOTE (OTHER) - ACTION/TREATMENT ORDERED:
Doctor made aware. Pt to be placed on isolation.
GARY Donnelly aware. Tele monitoring continued.
PA made aware. Hold AM dose of PO cardizem.
GARY Donnelly aware. STAT labs ordered.
Infusion discontinued. PA to order PO Cardizem

## 2024-04-04 NOTE — DISCHARGE NOTE PROVIDER - NSDCCAREPROVSEEN_GEN_ALL_CORE_FT
Angelica, Belinda Kim, Alexandria Lyons, Lakshmi Aguilar, Jennifer Michel, Jeanne Palla, Bhavin Arguello, Dada Suresh, Jann Patel, Brandan Talbert, Bharat Sampson, Cecille Bauman, Abimbola De Los Santos, Pelon Renee, Ayah Hansen, David

## 2024-04-04 NOTE — PROGRESS NOTE ADULT - ASSESSMENT
37 y/o M w/ PMH of HTN, asthma, sleep apnea (non-adherent w/ CPAP), dyslipidemia, p/w cough. Patient states he has been coughing for approximately 1.5 week. Cough is productive of brown sputum. Patient has been having intermittent fevers and chills. Patient has also been having associated dyspnea on exertion and palpitations. States he has been taking oral antibiotics and steroids outpatient without improvement.  Also feels like his abdomen is distended and c/o abdominal discomfort as well. Patient had 1 episode of vomiting on Thursday. Denies diarrhea / CP. Here noted with lingular consolidation on imaging RVP positive for covid19, parainfluenza. Pt was given IV abx, steroids. Noted to have recent [place]. Has elevated LFTs.     1.  Fever. Viral syndrome. Covid 19 viral syndrome. Parainfluenza. Superimposed pneumonia. Morbid obesity   - imaging reviewed   - on rocephin 2gm daily #3  - on azithromycin 500mg daily #3   - f/u sputum cx legionella ag, blood cx no growth   - gi eval noted f/u lfts- improving hep panel neg, us abd wnl  - continue with abx coverage  - if hypoxic, will add remdesivir  - on IV steroids  - tolerating abx well so far; no side effects noted  - reason for abx use and side effects reviewed with patient  - f/u CBC  - supportive care    2. Other issues:   -care per medicine    Clinical team may change from intravenous to oral antibiotics when the following criteria are met:   1. Patient is clinically improving/stable       a)	Improved signs and symptoms of infection from initial presentation       b)	Afebrile for 24 hours       c)	Leukocytosis trending towards normal range   2. Patient is tolerating oral intake   3. Initial/repeat blood cultures are negative     When above criteria met may change iv antibiotics to PO ceftin 500mg BID x 7 day course 3 days total azithromycin

## 2024-04-05 LAB
HEV AB FLD QL: NEGATIVE — SIGNIFICANT CHANGE UP
HEV IGM SER QL: NEGATIVE — SIGNIFICANT CHANGE UP

## 2024-04-05 RX ORDER — METOPROLOL TARTRATE 50 MG
1 TABLET ORAL
Qty: 30 | Refills: 0
Start: 2024-04-05 | End: 2024-05-04

## 2024-04-11 ENCOUNTER — APPOINTMENT (OUTPATIENT)
Dept: CARDIOLOGY | Facility: CLINIC | Age: 39
End: 2024-04-11
Payer: COMMERCIAL

## 2024-04-11 ENCOUNTER — NON-APPOINTMENT (OUTPATIENT)
Age: 39
End: 2024-04-11

## 2024-04-11 VITALS
HEIGHT: 68 IN | DIASTOLIC BLOOD PRESSURE: 90 MMHG | OXYGEN SATURATION: 96 % | WEIGHT: 315 LBS | HEART RATE: 70 BPM | BODY MASS INDEX: 47.74 KG/M2 | SYSTOLIC BLOOD PRESSURE: 142 MMHG

## 2024-04-11 DIAGNOSIS — J15.69 PNEUMONIA DUE TO OTHER GRAM-NEGATIVE BACTERIA: ICD-10-CM

## 2024-04-11 DIAGNOSIS — Z88.0 ALLERGY STATUS TO PENICILLIN: ICD-10-CM

## 2024-04-11 DIAGNOSIS — B34.8 OTHER VIRAL INFECTIONS OF UNSPECIFIED SITE: ICD-10-CM

## 2024-04-11 DIAGNOSIS — I11.0 HYPERTENSIVE HEART DISEASE WITH HEART FAILURE: ICD-10-CM

## 2024-04-11 DIAGNOSIS — J45.909 UNSPECIFIED ASTHMA, UNCOMPLICATED: ICD-10-CM

## 2024-04-11 DIAGNOSIS — E78.5 HYPERLIPIDEMIA, UNSPECIFIED: ICD-10-CM

## 2024-04-11 DIAGNOSIS — I51.9 HEART DISEASE, UNSPECIFIED: ICD-10-CM

## 2024-04-11 DIAGNOSIS — G47.33 OBSTRUCTIVE SLEEP APNEA (ADULT) (PEDIATRIC): ICD-10-CM

## 2024-04-11 DIAGNOSIS — I27.20 PULMONARY HYPERTENSION, UNSPECIFIED: ICD-10-CM

## 2024-04-11 DIAGNOSIS — I48.91 UNSPECIFIED ATRIAL FIBRILLATION: ICD-10-CM

## 2024-04-11 DIAGNOSIS — I50.21 ACUTE SYSTOLIC (CONGESTIVE) HEART FAILURE: ICD-10-CM

## 2024-04-11 DIAGNOSIS — K42.9 UMBILICAL HERNIA WITHOUT OBSTRUCTION OR GANGRENE: ICD-10-CM

## 2024-04-11 DIAGNOSIS — E66.01 MORBID (SEVERE) OBESITY DUE TO EXCESS CALORIES: ICD-10-CM

## 2024-04-11 DIAGNOSIS — A41.9 SEPSIS, UNSPECIFIED ORGANISM: ICD-10-CM

## 2024-04-11 DIAGNOSIS — U07.1 COVID-19: ICD-10-CM

## 2024-04-11 PROCEDURE — 99214 OFFICE O/P EST MOD 30 MIN: CPT

## 2024-04-11 PROCEDURE — 93000 ELECTROCARDIOGRAM COMPLETE: CPT

## 2024-04-11 RX ORDER — METOPROLOL SUCCINATE 50 MG/1
50 TABLET, EXTENDED RELEASE ORAL DAILY
Refills: 0 | Status: ACTIVE | COMMUNITY

## 2024-04-11 RX ORDER — METFORMIN HYDROCHLORIDE 500 MG/1
500 TABLET, COATED ORAL TWICE DAILY
Refills: 0 | Status: ACTIVE | COMMUNITY

## 2024-04-11 RX ORDER — ROSUVASTATIN CALCIUM 5 MG/1
5 TABLET, FILM COATED ORAL DAILY
Refills: 0 | Status: ACTIVE | COMMUNITY

## 2024-04-11 RX ORDER — CALCIUM CARBONATE AND SIMETHICONE 750; 80 MG/1; MG/1
750-80 TABLET, CHEWABLE ORAL
Qty: 90 | Refills: 0 | Status: DISCONTINUED | COMMUNITY
Start: 2022-08-31 | End: 2024-04-11

## 2024-04-11 NOTE — DISCUSSION/SUMMARY
[EKG obtained to assist in diagnosis and management of assessed problem(s)] : EKG obtained to assist in diagnosis and management of assessed problem(s) [FreeTextEntry1] : Here today for post hospital follow up admitted with cough and SOB diagnosed with  COVID PNA found to be in AF/RVR with newly reduced LV FX likely tach mediated complicated by underlying hypertension, hypertensive heart disease and morbid obesity troponin negative TSH NL, S/P successful SERA/DCCV to SR, symptoms fully resolved EKG today SR CW oral AC/BB (no CCB with LV Dysfunction)  Will need Ischemic evaluation CT Coronary arteries ordered (denies allergy and CMP ordered ) Repeat Echo re evaluate EF and MR in SR ( Tachycardiac during Echo) Echo ordered MCOT in progress , advise EP follow up  OV 1 month   PAO, has CPAP (not compliant).  Discussed importance of using CPAP regularly.   Encourage weight loss   Acute systolic congestive heart failure in setting of AF RVR/COVID PNA Appears euvolemic, weight stable SOB improved CW GDMT BB/Losartan and reassess LV FX in SR   HTN: Controlled on current medications advised significance of weight loss   HLD: CW statin   Plan DW patient

## 2024-04-11 NOTE — PHYSICAL EXAM
[Obese] : obese [Normal S1, S2] : normal S1, S2 [Soft] : abdomen soft [Non Tender] : non-tender [Normal Gait] : normal gait [No Edema] : no edema [Normal] : moves all extremities, no focal deficits, normal speech [Alert and Oriented] : alert and oriented

## 2024-04-11 NOTE — HISTORY OF PRESENT ILLNESS
[FreeTextEntry1] : 39 yo male HTN, asthma, sleep apnea (non-adherent w/ CPAP), dyslipidemia here today post hospital follow up PW cough/SOB intermittent fevers and chills with associated MATA and palpitations found to be in AF/RVR/LV dysfunction s/p SERA/DCCV yesterday with successful conversion to RSR. CTA neg for PE Troponin neg TSH NL   Currently claims to be feeling " much better"  EKG NSR     Echo 4/1/24  The left ventricle is dilated with moderate, diffuse hypokinesis;  estimated left ventricular ejection fraction is 35-40 %.  Moderate concentric left ventricular hypertrophy.  The left atrium is severely dilated.  The IVC is dilated with decreased respiratory variation.  Moderate mitral regurgitation.  Mild tricuspid valve regurgitation.  Mild pulmonary hypertension.   Note: Tachycardia is noted during the exam.    SERA 4/3/24  Reduced left ventricular systolic function. Estimated LVEF is 40%.  No evidence of right atrial, left atrial or left atrial appendage  thrombus.  Moderate mitral regurgitation.  Mild tricuspid regurgitation.  Mild pulmonic regurgitation.

## 2024-04-15 ENCOUNTER — NON-APPOINTMENT (OUTPATIENT)
Age: 39
End: 2024-04-15

## 2024-05-13 ENCOUNTER — APPOINTMENT (OUTPATIENT)
Dept: ELECTROPHYSIOLOGY | Facility: CLINIC | Age: 39
End: 2024-05-13

## 2024-05-15 ENCOUNTER — APPOINTMENT (OUTPATIENT)
Dept: CARDIOLOGY | Facility: CLINIC | Age: 39
End: 2024-05-15
Payer: COMMERCIAL

## 2024-05-15 PROCEDURE — 93306 TTE W/DOPPLER COMPLETE: CPT

## 2024-05-28 ENCOUNTER — APPOINTMENT (OUTPATIENT)
Dept: CARDIOLOGY | Facility: CLINIC | Age: 39
End: 2024-05-28
Payer: COMMERCIAL

## 2024-05-28 VITALS
DIASTOLIC BLOOD PRESSURE: 80 MMHG | WEIGHT: 315 LBS | BODY MASS INDEX: 47.74 KG/M2 | HEIGHT: 68 IN | SYSTOLIC BLOOD PRESSURE: 140 MMHG | OXYGEN SATURATION: 97 % | HEART RATE: 93 BPM

## 2024-05-28 DIAGNOSIS — I48.91 UNSPECIFIED ATRIAL FIBRILLATION: ICD-10-CM

## 2024-05-28 PROCEDURE — 93000 ELECTROCARDIOGRAM COMPLETE: CPT

## 2024-05-28 PROCEDURE — 99214 OFFICE O/P EST MOD 30 MIN: CPT

## 2024-05-28 PROCEDURE — G2211 COMPLEX E/M VISIT ADD ON: CPT | Mod: NC,1L

## 2024-05-28 RX ORDER — FAMOTIDINE 40 MG/1
40 TABLET, FILM COATED ORAL DAILY
Qty: 30 | Refills: 5 | Status: DISCONTINUED | COMMUNITY
Start: 2022-08-31 | End: 2024-05-28

## 2024-05-28 RX ORDER — LOSARTAN POTASSIUM 50 MG/1
50 TABLET, FILM COATED ORAL DAILY
Qty: 90 | Refills: 0 | Status: ACTIVE | COMMUNITY

## 2024-05-28 RX ORDER — APIXABAN 5 MG/1
5 TABLET, FILM COATED ORAL
Qty: 60 | Refills: 2 | Status: ACTIVE | COMMUNITY
Start: 2024-05-28

## 2024-05-28 RX ORDER — SPIRONOLACTONE 25 MG/1
25 TABLET ORAL DAILY
Qty: 30 | Refills: 3 | Status: ACTIVE | COMMUNITY
Start: 2024-05-28 | End: 1900-01-01

## 2024-05-29 ENCOUNTER — APPOINTMENT (OUTPATIENT)
Dept: CARDIOLOGY | Facility: CLINIC | Age: 39
End: 2024-05-29

## 2024-05-30 ENCOUNTER — APPOINTMENT (OUTPATIENT)
Dept: SURGERY | Facility: CLINIC | Age: 39
End: 2024-05-30
Payer: COMMERCIAL

## 2024-05-30 DIAGNOSIS — E66.01 MORBID (SEVERE) OBESITY DUE TO EXCESS CALORIES: ICD-10-CM

## 2024-05-30 NOTE — DISCUSSION/SUMMARY
[FreeTextEntry1] : Here today for post hospital follow up admitted with cough and SOB diagnosed with  COVID PNA found to be in AF/RVR with newly reduced LV FX likely tach mediated complicated by underlying hypertension, hypertensive heart disease and morbid obesity troponin negative TSH NL, S/P successful SERA/DCCV to SR, symptoms fully resolved EKG today SR CW oral AC/BB (no CCB with LV Dysfunction)  Will need Ischemic evaluation CT Coronary arteries ordered (denies allergy and CMP ordered ) Repeat Echo re evaluate EF and MR in SR ( Tachycardiac during Echo) Echo ordered MCOT in progress , advise EP follow up  OV 1 month   PAO, has CPAP (not compliant).  Discussed importance of using CPAP regularly.   Encourage weight loss   Acute systolic congestive heart failure in setting of AF RVR/COVID PNA Appears euvolemic, weight stable SOB improved CW GDMT BB/Losartan and reassess LV FX in SR   HTN: Controlled on current medications advised significance of weight loss   HLD: CW statin   Plan DW patient  [EKG obtained to assist in diagnosis and management of assessed problem(s)] : EKG obtained to assist in diagnosis and management of assessed problem(s)

## 2024-05-30 NOTE — HISTORY OF PRESENT ILLNESS
[de-identified] : Mr. VALDOVINOS is a 38 year old morbidly obese man, XXXX.  The patient presents requesting weight loss surgery. He has been more than 75 lb. overweight for greater than 5 years.   The patient has tried numerous weight loss programs including self-directed and physician supervised diets and self-directed exercise programs. He has lost greater than 10 pounds on more than one occasion, however, has experienced weight regain despite his best efforts with healthy diet and exercise.   The patient's history includes XXXX.   XXXX reflux  XXXX smoking

## 2024-05-30 NOTE — ASSESSMENT
[FreeTextEntry1] : Paroxysmal Afib  diagnosed in setting COVID s/p SERA/DCCV  risk factors include HTN, PAO, obesity  remains in NSR  cont eliquis and metoprolol   HFrEF  LVEF 40% on repeat TTE (initially 35-40%) despite remaining in NSR  euvolemic today  coronary CT pending to r/o ischemic heart disease  GDMT: will increase losartan, start spironolactone and cont toprol    PAO  (non-adherent with CPAP)  Discussed importance of CPAP use  Obesity discussed that obesity likely large contributor to Afib, HTN, PAO.  pt motivated to lose weight - will refer to weight management center    will f/u in 1 month

## 2024-05-30 NOTE — CARDIOLOGY SUMMARY
[de-identified] :  Echo 4/1/24  The left ventricle is dilated with moderate, diffuse hypokinesis;  estimated left ventricular ejection fraction is 35-40 %.  Moderate concentric left ventricular hypertrophy.  The left atrium is severely dilated.  The IVC is dilated with decreased respiratory variation.  Moderate mitral regurgitation.  Mild tricuspid valve regurgitation.  Mild pulmonary hypertension.   SERA 4/3/24  Reduced left ventricular systolic function. Estimated LVEF is 40%.  No evidence of right atrial, left atrial or left atrial appendage  thrombus.  Moderate mitral regurgitation.  Mild tricuspid regurgitation.  Mild pulmonic regurgitation.  TTE 5/15/2024 CONCLUSIONS:  1. Technically difficult image quality. 2. Left ventricular endocardium is not well visualized; however, the left ventricular systolic function appears moderately reduced with calculated ejection fraction of 41 %. 3. Moderate left ventricular hypertrophy. 4. Normal right ventricular cavity and normal function. 5. Mild mitral regurgitation.

## 2024-05-30 NOTE — REVIEW OF SYSTEMS
[Negative] : Heme/Lymph [Dyspnea on exertion] : dyspnea during exertion [Chest Discomfort] : no chest discomfort [Lower Ext Edema] : no extremity edema [Palpitations] : no palpitations [Orthopnea] : no orthopnea [PND] : no PND [Syncope] : no syncope

## 2024-05-30 NOTE — HISTORY OF PRESENT ILLNESS
[FreeTextEntry1] : 37 yo male with a hx paroxysmal atrial fibrillation s/p SERA/DCCV, HFrEF (LVEF 40% based on 5/2024 TTE), HTN, asthma, sleep apnea (non-adherent w/ CPAP), dyslipidemia here for a routine follow up.   Pt initially diagnosed with Afib in setting COVID infection. Underwent successful SERA/DCCV to NSR.  Echo at time of hospitalization showed LVEF 35-40%. Echo repeated 1 month later showed LVEF persistently reduced to 40%.    Since last visit, reports feeling improved. Does still have dyspnea with moderate exertion. Denies LE edema, orthopnea, PND.  Started walking about 3 times weekly.

## 2024-05-30 NOTE — PLAN
[FreeTextEntry1] : The risks, benefits and alternatives of laparoscopic/robotic gastric bypass and sleeve gastrectomy were discussed at length and all questions were answered. The patient appears to understand and wishes to proceed. Plan for XXXX.   The patient was given the following instructions:   1.   He needs a complete medical evaluation including cardiac evaluation and pulmonary evaluation.   2.   He needs an upper endoscopy.   3.   He needs dietary and psychological evaluations.   4.   He must undergo a right upper abdominal sonogram   The patient clearly understood that surgery would only be scheduled if there are no medical or psychiatric contraindications, and a second office visit is required.   The risks/benefits of weight loss surgery vs nonoperative management were discussed. We discussed the criteria used for eligibility. The patient understands.

## 2024-05-30 NOTE — ASSESSMENT
[FreeTextEntry1] : Mr. VALDOVINOS is a 38 year old man with morbid obesity who is unable to lose weight and lower his risk of co-morbid conditions with medical management including diet and exercise. He wishes to proceed with planning for bariatric surgery.

## 2024-05-31 ENCOUNTER — APPOINTMENT (OUTPATIENT)
Dept: SURGERY | Facility: CLINIC | Age: 39
End: 2024-05-31
Payer: COMMERCIAL

## 2024-05-31 VITALS
HEIGHT: 67 IN | OXYGEN SATURATION: 97 % | TEMPERATURE: 97.6 F | WEIGHT: 315 LBS | BODY MASS INDEX: 49.44 KG/M2 | HEART RATE: 101 BPM | DIASTOLIC BLOOD PRESSURE: 131 MMHG | SYSTOLIC BLOOD PRESSURE: 180 MMHG | RESPIRATION RATE: 16 BRPM

## 2024-05-31 VITALS — DIASTOLIC BLOOD PRESSURE: 112 MMHG | SYSTOLIC BLOOD PRESSURE: 161 MMHG

## 2024-05-31 DIAGNOSIS — Z01.818 ENCOUNTER FOR OTHER PREPROCEDURAL EXAMINATION: ICD-10-CM

## 2024-05-31 PROCEDURE — 99406 BEHAV CHNG SMOKING 3-10 MIN: CPT

## 2024-05-31 PROCEDURE — 99205 OFFICE O/P NEW HI 60 MIN: CPT | Mod: 25

## 2024-05-31 NOTE — PLAN
[FreeTextEntry1] : The risks, benefits and alternatives of laparoscopic/robotic gastric bypass and sleeve gastrectomy were discussed at length and all questions were answered. The patient appears to understand and wishes to proceed. Plan for sleeve gastrectomy.  The patient was given the following instructions:  1.   He needs a complete medical evaluation including cardiac evaluation and pulmonary evaluation.  2.   He needs an upper endoscopy.  3.   He needs dietary and psychological evaluations.  4.   He must undergo a right upper abdominal sonogram   The patient clearly understood that surgery would only be scheduled if there are no medical or psychiatric contraindications, and a second office visit is required.  The risks/benefits of weight loss surgery vs nonoperative management were discussed. We discussed the criteria used for eligibility. The patient understands.

## 2024-05-31 NOTE — CONSULT LETTER
[Dear  ___] : Dear  [unfilled], [Courtesy Letter:] : I had the pleasure of seeing your patient, [unfilled], in my office today. [Please see my note below.] : Please see my note below. [Consult Closing:] : Thank you very much for allowing me to participate in the care of this patient.  If you have any questions, please do not hesitate to contact me. [Sincerely,] : Sincerely, [FreeTextEntry3] : Troy Marquez MD, FACS Director of Bariatric Surgery [facility]  Assistant Professor of Surgery  [employer] at [facility] [DrJoe  ___] : Dr. ESCOBAR

## 2024-05-31 NOTE — HISTORY OF PRESENT ILLNESS
[de-identified] :  Mr. VALDOVINOS is a 38 year old morbidly obese man, 5'7" and 386 pounds (BMI 60.55).  The patient presents requesting weight loss surgery. He has been more than 75 lb. overweight for greater than 5 years.   The patient has tried numerous weight loss programs including self-directed and physician supervised diets and self-directed exercise programs. He has lost greater than 10 pounds on more than one occasion, however, has experienced weight regain despite his best efforts with healthy diet and exercise.  The patient's history includes high blood pressure, high cholesterol, diabetes, and obstructive sleep apnea (on CPAP).  denies reflux social smoker - Approximately 5 minutes of smoking cessation counseling provided. We discussed that he must be nicotine free for 30 days pre procedure and 60 days post procedure. He understands and agrees.

## 2024-05-31 NOTE — ASSESSMENT
[FreeTextEntry1] :  Mr. VALDOVINOS is a 38 year old man with morbid obesity who is unable to lose weight and lower his risk of co-morbid conditions with medical management including diet and exercise. He wishes to proceed with planning for bariatric surgery.  BP noted to be elevated, repeat performed at 160s/110s. Patient admits to not taking BP meds today. Recommend he take as soon as possible and contact Dr Mayfield and/or Dr Kim office to discuss BP regimen. He is agreeable to this.

## 2024-05-31 NOTE — PHYSICAL EXAM
[Normal] : affect appropriate [de-identified] : repeat BP was 160s / 110s [de-identified] : soft, notnender

## 2024-06-11 ENCOUNTER — APPOINTMENT (OUTPATIENT)
Dept: ULTRASOUND IMAGING | Facility: CLINIC | Age: 39
End: 2024-06-11

## 2024-06-14 ENCOUNTER — APPOINTMENT (OUTPATIENT)
Dept: BARIATRICS | Facility: CLINIC | Age: 39
End: 2024-06-14

## 2024-06-18 ENCOUNTER — APPOINTMENT (OUTPATIENT)
Dept: CARDIOLOGY | Facility: CLINIC | Age: 39
End: 2024-06-18

## 2024-06-26 ENCOUNTER — APPOINTMENT (OUTPATIENT)
Dept: CT IMAGING | Facility: CLINIC | Age: 39
End: 2024-06-26
Payer: COMMERCIAL

## 2024-06-26 PROCEDURE — 75574 CT ANGIO HRT W/3D IMAGE: CPT

## 2024-07-11 ENCOUNTER — APPOINTMENT (OUTPATIENT)
Dept: CARDIOLOGY | Facility: CLINIC | Age: 39
End: 2024-07-11

## 2024-07-15 ENCOUNTER — APPOINTMENT (OUTPATIENT)
Dept: BARIATRICS | Facility: CLINIC | Age: 39
End: 2024-07-15

## 2024-08-12 ENCOUNTER — APPOINTMENT (OUTPATIENT)
Dept: BARIATRICS | Facility: CLINIC | Age: 39
End: 2024-08-12

## 2024-08-30 ENCOUNTER — INPATIENT (INPATIENT)
Facility: HOSPITAL | Age: 39
LOS: 8 days | Discharge: HOME | DRG: 309 | End: 2024-09-08
Attending: HOSPITALIST | Admitting: HOSPITALIST
Payer: COMMERCIAL

## 2024-08-30 VITALS — HEIGHT: 67 IN

## 2024-08-30 DIAGNOSIS — I48.91 UNSPECIFIED ATRIAL FIBRILLATION: ICD-10-CM

## 2024-08-30 LAB
ALBUMIN SERPL ELPH-MCNC: 3.5 G/DL — SIGNIFICANT CHANGE UP (ref 3.3–5)
ALP SERPL-CCNC: 64 U/L — SIGNIFICANT CHANGE UP (ref 40–120)
ALT FLD-CCNC: 36 U/L — SIGNIFICANT CHANGE UP (ref 12–78)
ANION GAP SERPL CALC-SCNC: 7 MMOL/L — SIGNIFICANT CHANGE UP (ref 5–17)
APPEARANCE UR: CLEAR — SIGNIFICANT CHANGE UP
APTT BLD: 45.6 SEC — HIGH (ref 24.5–35.6)
AST SERPL-CCNC: 24 U/L — SIGNIFICANT CHANGE UP (ref 15–37)
BACTERIA # UR AUTO: NEGATIVE /HPF — SIGNIFICANT CHANGE UP
BASOPHILS # BLD AUTO: 0.04 K/UL — SIGNIFICANT CHANGE UP (ref 0–0.2)
BASOPHILS NFR BLD AUTO: 0.6 % — SIGNIFICANT CHANGE UP (ref 0–2)
BILIRUB SERPL-MCNC: 0.5 MG/DL — SIGNIFICANT CHANGE UP (ref 0.2–1.2)
BILIRUB UR-MCNC: NEGATIVE — SIGNIFICANT CHANGE UP
BLD GP AB SCN SERPL QL: SIGNIFICANT CHANGE UP
BUN SERPL-MCNC: 18 MG/DL — SIGNIFICANT CHANGE UP (ref 7–23)
CALCIUM SERPL-MCNC: 9.3 MG/DL — SIGNIFICANT CHANGE UP (ref 8.5–10.1)
CAST: 18 /LPF — HIGH (ref 0–4)
CHLORIDE SERPL-SCNC: 111 MMOL/L — HIGH (ref 96–108)
CO2 SERPL-SCNC: 21 MMOL/L — LOW (ref 22–31)
COLOR SPEC: YELLOW — SIGNIFICANT CHANGE UP
CREAT SERPL-MCNC: 1.59 MG/DL — HIGH (ref 0.5–1.3)
DIFF PNL FLD: NEGATIVE — SIGNIFICANT CHANGE UP
EGFR: 57 ML/MIN/1.73M2 — LOW
EOSINOPHIL # BLD AUTO: 0.15 K/UL — SIGNIFICANT CHANGE UP (ref 0–0.5)
EOSINOPHIL NFR BLD AUTO: 2.3 % — SIGNIFICANT CHANGE UP (ref 0–6)
GLUCOSE SERPL-MCNC: 126 MG/DL — HIGH (ref 70–99)
GLUCOSE UR QL: NEGATIVE MG/DL — SIGNIFICANT CHANGE UP
HCT VFR BLD CALC: 46.6 % — SIGNIFICANT CHANGE UP (ref 39–50)
HGB BLD-MCNC: 15.7 G/DL — SIGNIFICANT CHANGE UP (ref 13–17)
HYALINE CASTS # UR AUTO: PRESENT
IMM GRANULOCYTES NFR BLD AUTO: 0.5 % — SIGNIFICANT CHANGE UP (ref 0–0.9)
INR BLD: 1.19 RATIO — HIGH (ref 0.85–1.18)
KETONES UR-MCNC: ABNORMAL MG/DL
LEUKOCYTE ESTERASE UR-ACNC: NEGATIVE — SIGNIFICANT CHANGE UP
LYMPHOCYTES # BLD AUTO: 2.58 K/UL — SIGNIFICANT CHANGE UP (ref 1–3.3)
LYMPHOCYTES # BLD AUTO: 39.8 % — SIGNIFICANT CHANGE UP (ref 13–44)
MAGNESIUM SERPL-MCNC: 2.2 MG/DL — SIGNIFICANT CHANGE UP (ref 1.6–2.6)
MCHC RBC-ENTMCNC: 30 PG — SIGNIFICANT CHANGE UP (ref 27–34)
MCHC RBC-ENTMCNC: 33.7 GM/DL — SIGNIFICANT CHANGE UP (ref 32–36)
MCV RBC AUTO: 89.1 FL — SIGNIFICANT CHANGE UP (ref 80–100)
MONOCYTES # BLD AUTO: 0.74 K/UL — SIGNIFICANT CHANGE UP (ref 0–0.9)
MONOCYTES NFR BLD AUTO: 11.4 % — SIGNIFICANT CHANGE UP (ref 2–14)
NEUTROPHILS # BLD AUTO: 2.94 K/UL — SIGNIFICANT CHANGE UP (ref 1.8–7.4)
NEUTROPHILS NFR BLD AUTO: 45.4 % — SIGNIFICANT CHANGE UP (ref 43–77)
NITRITE UR-MCNC: NEGATIVE — SIGNIFICANT CHANGE UP
PH UR: 6 — SIGNIFICANT CHANGE UP (ref 5–8)
PHOSPHATE SERPL-MCNC: 3.8 MG/DL — SIGNIFICANT CHANGE UP (ref 2.5–4.5)
PLATELET # BLD AUTO: 213 K/UL — SIGNIFICANT CHANGE UP (ref 150–400)
POTASSIUM SERPL-MCNC: 4 MMOL/L — SIGNIFICANT CHANGE UP (ref 3.5–5.3)
POTASSIUM SERPL-SCNC: 4 MMOL/L — SIGNIFICANT CHANGE UP (ref 3.5–5.3)
PROT SERPL-MCNC: 7.4 GM/DL — SIGNIFICANT CHANGE UP (ref 6–8.3)
PROT UR-MCNC: 100 MG/DL
PROTHROM AB SERPL-ACNC: 13.4 SEC — HIGH (ref 9.5–13)
RBC # BLD: 5.23 M/UL — SIGNIFICANT CHANGE UP (ref 4.2–5.8)
RBC # FLD: 16 % — HIGH (ref 10.3–14.5)
RBC CASTS # UR COMP ASSIST: 1 /HPF — SIGNIFICANT CHANGE UP (ref 0–4)
SODIUM SERPL-SCNC: 139 MMOL/L — SIGNIFICANT CHANGE UP (ref 135–145)
SP GR SPEC: 1.02 — SIGNIFICANT CHANGE UP (ref 1–1.03)
SQUAMOUS # UR AUTO: 1 /HPF — SIGNIFICANT CHANGE UP (ref 0–5)
TROPONIN I, HIGH SENSITIVITY RESULT: 56.02 NG/L — SIGNIFICANT CHANGE UP
UROBILINOGEN FLD QL: 0.2 MG/DL — SIGNIFICANT CHANGE UP (ref 0.2–1)
WBC # BLD: 6.48 K/UL — SIGNIFICANT CHANGE UP (ref 3.8–10.5)
WBC # FLD AUTO: 6.48 K/UL — SIGNIFICANT CHANGE UP (ref 3.8–10.5)
WBC UR QL: 1 /HPF — SIGNIFICANT CHANGE UP (ref 0–5)

## 2024-08-30 PROCEDURE — 99291 CRITICAL CARE FIRST HOUR: CPT

## 2024-08-30 PROCEDURE — 80053 COMPREHEN METABOLIC PANEL: CPT

## 2024-08-30 PROCEDURE — 93312 ECHO TRANSESOPHAGEAL: CPT

## 2024-08-30 PROCEDURE — 85025 COMPLETE CBC W/AUTO DIFF WBC: CPT

## 2024-08-30 PROCEDURE — 83735 ASSAY OF MAGNESIUM: CPT

## 2024-08-30 PROCEDURE — 85379 FIBRIN DEGRADATION QUANT: CPT

## 2024-08-30 PROCEDURE — 36415 COLL VENOUS BLD VENIPUNCTURE: CPT

## 2024-08-30 PROCEDURE — 71045 X-RAY EXAM CHEST 1 VIEW: CPT | Mod: 26

## 2024-08-30 PROCEDURE — 94660 CPAP INITIATION&MGMT: CPT

## 2024-08-30 PROCEDURE — 83036 HEMOGLOBIN GLYCOSYLATED A1C: CPT

## 2024-08-30 PROCEDURE — 80048 BASIC METABOLIC PNL TOTAL CA: CPT

## 2024-08-30 PROCEDURE — 84100 ASSAY OF PHOSPHORUS: CPT

## 2024-08-30 PROCEDURE — 99223 1ST HOSP IP/OBS HIGH 75: CPT

## 2024-08-30 PROCEDURE — 92960 CARDIOVERSION ELECTRIC EXT: CPT

## 2024-08-30 PROCEDURE — 80061 LIPID PANEL: CPT

## 2024-08-30 PROCEDURE — 93010 ELECTROCARDIOGRAM REPORT: CPT

## 2024-08-30 PROCEDURE — 93005 ELECTROCARDIOGRAM TRACING: CPT

## 2024-08-30 PROCEDURE — 93308 TTE F-UP OR LMTD: CPT

## 2024-08-30 PROCEDURE — 84443 ASSAY THYROID STIM HORMONE: CPT

## 2024-08-30 PROCEDURE — 94640 AIRWAY INHALATION TREATMENT: CPT

## 2024-08-30 PROCEDURE — 82962 GLUCOSE BLOOD TEST: CPT

## 2024-08-30 PROCEDURE — 76376 3D RENDER W/INTRP POSTPROCES: CPT

## 2024-08-30 PROCEDURE — 93320 DOPPLER ECHO COMPLETE: CPT

## 2024-08-30 PROCEDURE — 93325 DOPPLER ECHO COLOR FLOW MAPG: CPT

## 2024-08-30 RX ORDER — SPIRONOLACTONE 25 MG/1
1 TABLET, FILM COATED ORAL
Refills: 0 | DISCHARGE

## 2024-08-30 RX ORDER — METOPROLOL TARTRATE 100 MG/1
50 TABLET ORAL AT BEDTIME
Refills: 0 | Status: DISCONTINUED | OUTPATIENT
Start: 2024-08-30 | End: 2024-08-31

## 2024-08-30 RX ORDER — FOLIC ACID/MULTIVIT,IRON,MINER 0.4MG-18MG
1000 TABLET,CHEWABLE ORAL DAILY
Refills: 0 | Status: DISCONTINUED | OUTPATIENT
Start: 2024-08-30 | End: 2024-09-08

## 2024-08-30 RX ORDER — APIXABAN 5 MG/1
5 TABLET, FILM COATED ORAL EVERY 12 HOURS
Refills: 0 | Status: DISCONTINUED | OUTPATIENT
Start: 2024-08-30 | End: 2024-09-08

## 2024-08-30 RX ORDER — FOLIC ACID/MULTIVIT,IRON,MINER 0.4MG-18MG
1 TABLET,CHEWABLE ORAL
Refills: 0 | DISCHARGE

## 2024-08-30 RX ORDER — DILTIAZEM HYDROCHLORIDE 5 MG/ML
10 INJECTION INTRAVENOUS
Qty: 125 | Refills: 0 | Status: DISCONTINUED | OUTPATIENT
Start: 2024-08-30 | End: 2024-08-31

## 2024-08-30 RX ORDER — CYANOCOBALAMIN (VITAMIN B-12) 500MCG/0.1
1000 GEL (ML) NASAL DAILY
Refills: 0 | Status: DISCONTINUED | OUTPATIENT
Start: 2024-08-30 | End: 2024-09-08

## 2024-08-30 RX ORDER — ROSUVASTATIN CALCIUM 10 MG/1
5 TABLET ORAL AT BEDTIME
Refills: 0 | Status: DISCONTINUED | OUTPATIENT
Start: 2024-08-30 | End: 2024-09-08

## 2024-08-30 RX ORDER — GLUCOSAMINE/MSM/CHONDROITIN A 500-83-400
1 TABLET ORAL
Refills: 0 | DISCHARGE

## 2024-08-30 RX ORDER — TIOTROPIUM BROMIDE INHALATION SPRAY 3.12 UG/1
2 SPRAY, METERED RESPIRATORY (INHALATION) DAILY
Refills: 0 | Status: DISCONTINUED | OUTPATIENT
Start: 2024-08-30 | End: 2024-09-08

## 2024-08-30 RX ORDER — TIOTROPIUM BROMIDE INHALATION SPRAY 3.12 UG/1
1 SPRAY, METERED RESPIRATORY (INHALATION)
Refills: 0 | DISCHARGE

## 2024-08-30 RX ORDER — SODIUM CHLORIDE 9 MG/ML
500 INJECTION INTRAMUSCULAR; INTRAVENOUS; SUBCUTANEOUS ONCE
Refills: 0 | Status: COMPLETED | OUTPATIENT
Start: 2024-08-30 | End: 2024-08-30

## 2024-08-30 RX ORDER — ASCORBIC ACID/ASCORBATE SODIUM 500 MG
500 TABLET,CHEWABLE ORAL DAILY
Refills: 0 | Status: DISCONTINUED | OUTPATIENT
Start: 2024-08-30 | End: 2024-09-08

## 2024-08-30 RX ORDER — METFORMIN HYDROCHLORIDE 850 MG/1
2 TABLET, FILM COATED ORAL
Refills: 0 | DISCHARGE

## 2024-08-30 RX ORDER — SODIUM CHLORIDE 9 MG/ML
1000 INJECTION INTRAMUSCULAR; INTRAVENOUS; SUBCUTANEOUS ONCE
Refills: 0 | Status: COMPLETED | OUTPATIENT
Start: 2024-08-30 | End: 2024-08-30

## 2024-08-30 RX ORDER — ASCORBIC ACID/ASCORBATE SODIUM 500 MG
1 TABLET,CHEWABLE ORAL
Refills: 0 | DISCHARGE

## 2024-08-30 RX ORDER — CYANOCOBALAMIN (VITAMIN B-12) 500MCG/0.1
1 GEL (ML) NASAL
Refills: 0 | DISCHARGE

## 2024-08-30 RX ORDER — DILTIAZEM HYDROCHLORIDE 5 MG/ML
10 INJECTION INTRAVENOUS ONCE
Refills: 0 | Status: COMPLETED | OUTPATIENT
Start: 2024-08-30 | End: 2024-08-30

## 2024-08-30 RX ADMIN — DILTIAZEM HYDROCHLORIDE 10 MILLIGRAM(S): 5 INJECTION INTRAVENOUS at 15:18

## 2024-08-30 RX ADMIN — DILTIAZEM HYDROCHLORIDE 10 MILLIGRAM(S): 5 INJECTION INTRAVENOUS at 15:12

## 2024-08-30 RX ADMIN — METOPROLOL TARTRATE 50 MILLIGRAM(S): 100 TABLET ORAL at 22:41

## 2024-08-30 RX ADMIN — ROSUVASTATIN CALCIUM 5 MILLIGRAM(S): 10 TABLET ORAL at 22:41

## 2024-08-30 RX ADMIN — SODIUM CHLORIDE 1000 MILLILITER(S): 9 INJECTION INTRAMUSCULAR; INTRAVENOUS; SUBCUTANEOUS at 16:52

## 2024-08-30 RX ADMIN — DILTIAZEM HYDROCHLORIDE 10 MG/HR: 5 INJECTION INTRAVENOUS at 20:42

## 2024-08-30 RX ADMIN — DILTIAZEM HYDROCHLORIDE 10 MG/HR: 5 INJECTION INTRAVENOUS at 16:03

## 2024-08-30 RX ADMIN — APIXABAN 5 MILLIGRAM(S): 5 TABLET, FILM COATED ORAL at 22:41

## 2024-08-30 RX ADMIN — SODIUM CHLORIDE 1000 MILLILITER(S): 9 INJECTION INTRAMUSCULAR; INTRAVENOUS; SUBCUTANEOUS at 15:12

## 2024-08-30 NOTE — ED ADULT NURSE NOTE - NSFALLHARMRISKINTERV_ED_ALL_ED

## 2024-08-30 NOTE — ED PROVIDER NOTE - CRITICAL CARE ATTENDING CONTRIBUTION TO CARE
Critical care time spent evaluating and reevaluating patient, ordering and interpreting diagnostics, ordering therapeutics, speaking to pt and admitting MD, reviewing old records and documenting. Lisa RODRIGUEZ

## 2024-08-30 NOTE — H&P ADULT - CONVERSATION DETAILS
Pt is Full Code and would like all measures of resuscitation at this time  incl IVF, antibiotics,  HD.   Pt  states his mother,  Chely Alas, would be his HCP.

## 2024-08-30 NOTE — ED ADULT TRIAGE NOTE - CHIEF COMPLAINT QUOTE
Pt c/o palpitations starting this morning. States his smart watch told him that his HR is "bouncing all over the place". Noted to be diaphoretic. HX of AFIB on Eliquis, HTN, and DM. Denies chest pain, SOB, and dizziness. STAT EKG to be completed.

## 2024-08-30 NOTE — H&P ADULT - NSHPSOCIALHISTORY_GEN_ALL_CORE
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   Pt reports he smoke two - three cigs daily.

## 2024-08-30 NOTE — PATIENT PROFILE ADULT - FUNCTIONAL ASSESSMENT - BASIC MOBILITY 6.
4-calculated by average/Not able to assess (calculate score using [facility] averaging method)

## 2024-08-30 NOTE — ED PROVIDER NOTE - CLINICAL SUMMARY MEDICAL DECISION MAKING FREE TEXT BOX
38 year-old male  w/ PMHx of Afib and HTN who had cardioversion in March, and normally has normal sinus rhythm who developed palpitations td, smart watch said rate was high. Rapid Afib at 157 rapid. Will give Cardizem, pt's already on  Eliquis. Will order labs, ekg, chest x-ray, probably admission.

## 2024-08-30 NOTE — ED ADULT NURSE NOTE - OBJECTIVE STATEMENT
Pt presents to the ED from home c/o palpitations that began this morning which he was alerted via Apple watch. Pt states his does feel like his heart is beating fast but denies cp and sob. Hx AFIB, HTN, and DM. Takes metoprolol and eliquis. Endorses having 4 alcoholic drinks last night. A&Ox4. On cardiac monitor.

## 2024-08-30 NOTE — H&P ADULT - NSHPPHYSICALEXAM_GEN_ALL_CORE
ICU Vital Signs Last 24 Hrs  T(C): 37 (30 Aug 2024 21:00), Max: 37.2 (30 Aug 2024 15:50)  T(F): 98.6 (30 Aug 2024 21:00), Max: 98.9 (30 Aug 2024 15:50)  HR: 101 (30 Aug 2024 21:00) (63 - 156)  BP: 136/89 (30 Aug 2024 21:00) (93/73 - 139/74)  BP(mean): 94 (30 Aug 2024 20:25) (79 - 100)    RR: 19 (30 Aug 2024 21:00) (16 - 20)  SpO2: 97% (30 Aug 2024 21:00) (97% - 99%)    O2 Parameters below as of 30 Aug 2024 21:00  Patient On (Oxygen Delivery Method): room air

## 2024-08-30 NOTE — H&P ADULT - ASSESSMENT
Pt is admitted w/    Palpitations  Shortness of breath  Atrial fibrillation RVR  MARIANELA  Class III Obesity  Tobacco use  Hx of HTN  Hx of DM    -continue Telemetry monitoring   - s/p 1500 ml NS IVF in the ED  -cont eliquis for anticoagulation  - on cardizem iv drip  -cont statin and metoprolol 50mg po with parameters  - brief asymptomatic Bradycardia at 37- 50's noted on overnight telemetry  - hold losartan, spironolactone tonight  due to MARIANELA  - cardiology consult Dr. Kim/Jann Tillman and team  - cont metformin  - pt counseled on smoking and ETOH cessation  - pt counseled on close follow up with his PCP and plan for lifestyle modification/weight loss    to avoid further harmful effects on his health  - DVT proph on eliquis  - Full Code Pt is admitted w/    Palpitations  Shortness of breath  Atrial fibrillation RVR  MARIANELA  Class III Obesity  Tobacco use  Hx of HTN  Hx of DM    -continue Telemetry monitoring   - s/p 1500 ml NS IVF in the ED  -cont eliquis for anticoagulation  - on cardizem iv drip  -cont statin and metoprolol 50mg po with parameters  - brief asymptomatic Bradycardia at 37- 50's noted on overnight telemetry  - hold losartan, spironolactone tonight  due to MARIANELA  - cardiology consult Dr. Kim/Jann Tillman and team  - cont metformin if creatine is < 1.5  - pt counseled on smoking and ETOH cessation  - pt counseled on close follow up with his PCP and plan for lifestyle modification/weight loss    to avoid further harmful effects on his health  - DVT proph on eliquis  - Full Code

## 2024-08-30 NOTE — ED PROVIDER NOTE - OBJECTIVE STATEMENT
Pt is a 37 yo m with hx htn and afib since covid in March. pt states was admitted and converted is on Metoprolol and eliquis daily and had not had another episode. This am awake with palpitations and smart watch told him his rate was all over the place. Called cardio but decided to come in. No cp or sob. Drank four drinks last pm but usuaually does not drink that much. Smoke two cigs daily. Pt is a 37 yo m with hx htn and afib since covid in March. pt states was admitted and converted is on Metoprolol and eliquis daily and had not had another episode. This am awake with palpitations and smart watch told him his rate was all over the place. Called cardio but decided to come in. No cp or sob. Drank four drinks last pm but usually does not drink that much. Smoke two cigs daily.

## 2024-08-30 NOTE — H&P ADULT - HISTORY OF PRESENT ILLNESS
Pt is a pleasant 39 yo m with hx htn and afib since covid in March. pt states was admitted and converted is on Metoprolol and eliquis daily and had not had another episode. This am awake with palpitations and smart watch told him his rate was all over the place. Called cardio but decided to come in. No cp or sob. Drank four drinks last pm but usuaually does not drink that much. Smoke two cigs daily. Pt is a pleasant 39 yo m with Past med hx of HTN , DM, Obesity, PAO and afib since covid in March 2024.   Pt states he was admitted and cardioverted to sinus rhythm in March 2024.  He has  been on Metoprolol and eliquis daily and had not had another episode. Today morning he woke with palpitations and his smart watch told him his rate was all over the place.  Pt called his cardiologist but then decided to come to [place] ED .   He denied cp or sob.    Pt admitted that he drank four ETOH drinks last night including 2 tequila /soda drinks and 2 shots but that usually he does not drink that much.   Pt reports he smoke two - three cigs daily.    Pt denies current cpain/SOB,  no dizziness, no fever/chills,  no abd pain, no n/v/d,   no urinary or resp  complaints.

## 2024-08-30 NOTE — PATIENT PROFILE ADULT - FALL HARM RISK - HARM RISK INTERVENTIONS

## 2024-08-31 ENCOUNTER — RESULT REVIEW (OUTPATIENT)
Age: 39
End: 2024-08-31

## 2024-08-31 DIAGNOSIS — I48.0 PAROXYSMAL ATRIAL FIBRILLATION: ICD-10-CM

## 2024-08-31 DIAGNOSIS — I51.9 HEART DISEASE, UNSPECIFIED: ICD-10-CM

## 2024-08-31 LAB
ANION GAP SERPL CALC-SCNC: 6 MMOL/L — SIGNIFICANT CHANGE UP (ref 5–17)
BUN SERPL-MCNC: 18 MG/DL — SIGNIFICANT CHANGE UP (ref 7–23)
CALCIUM SERPL-MCNC: 9 MG/DL — SIGNIFICANT CHANGE UP (ref 8.5–10.1)
CHLORIDE SERPL-SCNC: 110 MMOL/L — HIGH (ref 96–108)
CHOLEST SERPL-MCNC: 142 MG/DL — SIGNIFICANT CHANGE UP
CO2 SERPL-SCNC: 22 MMOL/L — SIGNIFICANT CHANGE UP (ref 22–31)
CREAT SERPL-MCNC: 1.34 MG/DL — HIGH (ref 0.5–1.3)
EGFR: 70 ML/MIN/1.73M2 — SIGNIFICANT CHANGE UP
GLUCOSE SERPL-MCNC: 141 MG/DL — HIGH (ref 70–99)
HDLC SERPL-MCNC: 46 MG/DL — SIGNIFICANT CHANGE UP
LIPID PNL WITH DIRECT LDL SERPL: 78 MG/DL — SIGNIFICANT CHANGE UP
NON HDL CHOLESTEROL: 97 MG/DL — SIGNIFICANT CHANGE UP
POTASSIUM SERPL-MCNC: 3.9 MMOL/L — SIGNIFICANT CHANGE UP (ref 3.5–5.3)
POTASSIUM SERPL-SCNC: 3.9 MMOL/L — SIGNIFICANT CHANGE UP (ref 3.5–5.3)
SODIUM SERPL-SCNC: 138 MMOL/L — SIGNIFICANT CHANGE UP (ref 135–145)
TRIGL SERPL-MCNC: 99 MG/DL — SIGNIFICANT CHANGE UP
TSH SERPL-MCNC: 2.38 UU/ML — SIGNIFICANT CHANGE UP (ref 0.34–4.82)

## 2024-08-31 PROCEDURE — 99223 1ST HOSP IP/OBS HIGH 75: CPT

## 2024-08-31 PROCEDURE — 93010 ELECTROCARDIOGRAM REPORT: CPT

## 2024-08-31 PROCEDURE — 99232 SBSQ HOSP IP/OBS MODERATE 35: CPT

## 2024-08-31 PROCEDURE — 93308 TTE F-UP OR LMTD: CPT | Mod: 26

## 2024-08-31 RX ORDER — METOPROLOL TARTRATE 100 MG/1
5 TABLET ORAL EVERY 6 HOURS
Refills: 0 | Status: DISCONTINUED | OUTPATIENT
Start: 2024-08-31 | End: 2024-09-08

## 2024-08-31 RX ORDER — METOPROLOL TARTRATE 100 MG/1
50 TABLET ORAL ONCE
Refills: 0 | Status: COMPLETED | OUTPATIENT
Start: 2024-08-31 | End: 2024-08-31

## 2024-08-31 RX ORDER — METOPROLOL TARTRATE 100 MG/1
100 TABLET ORAL DAILY
Refills: 0 | Status: DISCONTINUED | OUTPATIENT
Start: 2024-08-31 | End: 2024-09-01

## 2024-08-31 RX ORDER — DILTIAZEM HYDROCHLORIDE 5 MG/ML
5 INJECTION INTRAVENOUS
Qty: 125 | Refills: 0 | Status: DISCONTINUED | OUTPATIENT
Start: 2024-08-31 | End: 2024-09-03

## 2024-08-31 RX ORDER — SENNA 187 MG
2 TABLET ORAL AT BEDTIME
Refills: 0 | Status: DISCONTINUED | OUTPATIENT
Start: 2024-08-31 | End: 2024-09-08

## 2024-08-31 RX ADMIN — Medication 1000 MICROGRAM(S): at 08:28

## 2024-08-31 RX ADMIN — ROSUVASTATIN CALCIUM 5 MILLIGRAM(S): 10 TABLET ORAL at 22:25

## 2024-08-31 RX ADMIN — APIXABAN 5 MILLIGRAM(S): 5 TABLET, FILM COATED ORAL at 08:27

## 2024-08-31 RX ADMIN — DILTIAZEM HYDROCHLORIDE 10 MG/HR: 5 INJECTION INTRAVENOUS at 16:41

## 2024-08-31 RX ADMIN — METOPROLOL TARTRATE 50 MILLIGRAM(S): 100 TABLET ORAL at 16:28

## 2024-08-31 RX ADMIN — Medication 500 MILLIGRAM(S): at 08:28

## 2024-08-31 RX ADMIN — Medication 1000 UNIT(S): at 08:29

## 2024-08-31 RX ADMIN — APIXABAN 5 MILLIGRAM(S): 5 TABLET, FILM COATED ORAL at 22:25

## 2024-08-31 NOTE — PROGRESS NOTE ADULT - ASSESSMENT
#Rapid afib  #MARIANELA  #DM/Obesity.HTN  - cardizem gtt  - cont doac  - check tsh  - gentle ivf  - cardio f/u for DCCV?..  -cont statin and metoprolol 50mg po with parameters  - brief asymptomatic Bradycardia at 37- 50's noted on overnight telemetry while asleep   - hold losartan, spironolactone tonight  due to MARIANELA  - pt counseled on close follow up with his PCP and plan for lifestyle modification/weight loss    to avoid further harmful effects on his health  - DVT proph on eliquis  - Full Code

## 2024-08-31 NOTE — PROGRESS NOTE ADULT - PROBLEM SELECTOR PLAN 2
-nonischemic - Coronary CT angio as OP w/ normal coronaries.  -euvolemic    -cont. metoprolol.  Restart aaron on d/c, was on this as OP.  OP notes also state he was on losartan will consider starting   this v. ARNI once rates are controlled.

## 2024-09-01 LAB
D DIMER BLD IA.RAPID-MCNC: <150 NG/ML DDU — SIGNIFICANT CHANGE UP
TSH SERPL-MCNC: 2.98 UU/ML — SIGNIFICANT CHANGE UP (ref 0.34–4.82)

## 2024-09-01 PROCEDURE — 99233 SBSQ HOSP IP/OBS HIGH 50: CPT

## 2024-09-01 RX ORDER — L.ACID,PARA/B.BIFIDUM/S.THERM 8B CELL
1 CAPSULE ORAL DAILY
Refills: 0 | Status: DISCONTINUED | OUTPATIENT
Start: 2024-09-01 | End: 2024-09-08

## 2024-09-01 RX ORDER — METOPROLOL TARTRATE 100 MG/1
50 TABLET ORAL ONCE
Refills: 0 | Status: COMPLETED | OUTPATIENT
Start: 2024-09-01 | End: 2024-09-01

## 2024-09-01 RX ORDER — METOPROLOL TARTRATE 100 MG/1
200 TABLET ORAL DAILY
Refills: 0 | Status: DISCONTINUED | OUTPATIENT
Start: 2024-09-01 | End: 2024-09-05

## 2024-09-01 RX ADMIN — APIXABAN 5 MILLIGRAM(S): 5 TABLET, FILM COATED ORAL at 21:11

## 2024-09-01 RX ADMIN — METOPROLOL TARTRATE 100 MILLIGRAM(S): 100 TABLET ORAL at 10:49

## 2024-09-01 RX ADMIN — METOPROLOL TARTRATE 50 MILLIGRAM(S): 100 TABLET ORAL at 11:58

## 2024-09-01 RX ADMIN — METOPROLOL TARTRATE 50 MILLIGRAM(S): 100 TABLET ORAL at 18:50

## 2024-09-01 RX ADMIN — ROSUVASTATIN CALCIUM 5 MILLIGRAM(S): 10 TABLET ORAL at 21:11

## 2024-09-01 RX ADMIN — Medication 500 MILLIGRAM(S): at 10:50

## 2024-09-01 RX ADMIN — APIXABAN 5 MILLIGRAM(S): 5 TABLET, FILM COATED ORAL at 10:50

## 2024-09-01 RX ADMIN — METOPROLOL TARTRATE 5 MILLIGRAM(S): 100 TABLET ORAL at 08:21

## 2024-09-01 RX ADMIN — Medication 1 TABLET(S): at 11:57

## 2024-09-01 RX ADMIN — Medication 1000 UNIT(S): at 10:49

## 2024-09-01 RX ADMIN — Medication 1000 MICROGRAM(S): at 10:49

## 2024-09-01 NOTE — PROGRESS NOTE ADULT - ASSESSMENT
#Rapid afib  #Non ischemic cardiomyopathy  #MARIANELA  #DM/Obesity.HTN  - increase lopresor to 150mg po qd  - EF unchanged 35-40%  - discussed use of sotalol with cardiology and the consideration for repeat DCCV vs ablation, cardiology will further elucidate these options with patient  - cont doac  - check tsh: normal. dimer negative  - hold losartan, spironolactone until Scr normalizes  - pt counseled on close follow up with his PCP and plan for lifestyle modification/weight loss    to avoid further harmful effects on his health  - DVT proph on eliquis  - Full Code

## 2024-09-01 NOTE — PROGRESS NOTE ADULT - PROBLEM SELECTOR PLAN 2
-nonischemic - Coronary CT angio as OP w/ normal coronaries.  -euvolemic    -cont. metoprolol.    -hold other GDMT agents (ARB/ARNI, aaron) until HRs controlled.  or sinus rhythm.

## 2024-09-02 PROCEDURE — 99233 SBSQ HOSP IP/OBS HIGH 50: CPT

## 2024-09-02 PROCEDURE — 99232 SBSQ HOSP IP/OBS MODERATE 35: CPT

## 2024-09-02 RX ADMIN — METOPROLOL TARTRATE 200 MILLIGRAM(S): 100 TABLET ORAL at 09:04

## 2024-09-02 RX ADMIN — Medication 1 TABLET(S): at 09:04

## 2024-09-02 RX ADMIN — Medication 1000 UNIT(S): at 09:04

## 2024-09-02 RX ADMIN — DILTIAZEM HYDROCHLORIDE 5 MG/HR: 5 INJECTION INTRAVENOUS at 17:35

## 2024-09-02 RX ADMIN — TIOTROPIUM BROMIDE INHALATION SPRAY 2 PUFF(S): 3.12 SPRAY, METERED RESPIRATORY (INHALATION) at 09:38

## 2024-09-02 RX ADMIN — METOPROLOL TARTRATE 5 MILLIGRAM(S): 100 TABLET ORAL at 14:52

## 2024-09-02 RX ADMIN — Medication 500 MILLIGRAM(S): at 09:04

## 2024-09-02 RX ADMIN — ROSUVASTATIN CALCIUM 5 MILLIGRAM(S): 10 TABLET ORAL at 21:44

## 2024-09-02 RX ADMIN — Medication 1000 MICROGRAM(S): at 09:04

## 2024-09-02 RX ADMIN — APIXABAN 5 MILLIGRAM(S): 5 TABLET, FILM COATED ORAL at 21:44

## 2024-09-02 RX ADMIN — APIXABAN 5 MILLIGRAM(S): 5 TABLET, FILM COATED ORAL at 09:04

## 2024-09-02 NOTE — CONSULT NOTE ADULT - ASSESSMENT
This is a 38-year-old male with hypertension diabetes obesity obstructive sleep apnea who experienced his first episode of atrial fibrillation in March 2024.  He presents with recurrent atrial fibrillation and has a rapid ventricular response he had several alcoholic beverages on the night prior to the episode and smokes cigarettes as well.  He has missed doses of his anticoagulation.      Atrial fibrillation: This is a second persistent episode of atrial fibrillation in this gentleman at this time would recommend a SERA cardioversion with SERA to clear his left atrial appendage of clot given his [id number] score of 2.  And then initiation of antiarrhythmic therapy with Tikosyn starting to 50 mcg twice a day and following of QT intervals    .

## 2024-09-02 NOTE — PROGRESS NOTE ADULT - ASSESSMENT
#Rapid afib  #Non ischemic cardiomyopathy  #MARIANELA  #DM/Obesity.HTN  - increase lopresor to 150mg po qd  - EF unchanged 35-40%  - increase metoprolol 200 qd  - NPO a MN for DCCV on tuesday  - EP: tikosyn?...  - cont doac  - check tsh: normal. dimer negative  - hold losartan, spironolactone until Scr normalizes  - pt counseled on close follow up with his PCP and plan for lifestyle modification/weight loss    to avoid further harmful effects on his health  - DVT proph on eliquis  - Full Code

## 2024-09-02 NOTE — CONSULT NOTE ADULT - SUBJECTIVE AND OBJECTIVE BOX
Patient is a 38y old  Male who presents with a chief complaint of Palpitations  Afib RVR (02 Sep 2024 11:57)      HPI:  Pt is a pleasant 39 yo m with  HTN , DM, Obesity, PAO and afib since covid in March 2024.   Pt states he was admitted and cardioverted to sinus rhythm in March 2024.  He has  been on Metoprolol and eliquis daily and had not had another episode. Today morning he woke with palpitations and his smart watch told him his rate was all over the place.  Pt called his cardiologist but then decided to come to [place] [facility] .   He denied cp or sob.    Pt admitted that he drank four ETOH drinks last night including 2 tequila /soda drinks and 2 shots but that usually he does not drink that much.   Pt reports he smoke two - three cigs daily.    Pt denies current cpain/SOB,  no dizziness, no fever/chills,  no abd pain, no n/v/d,   no urinary or resp  complaints.       (30 Aug 2024 23:33)    He missed doses of his anitcoagulation.       PAST MEDICAL & SURGICAL HISTORY:  HTN (hypertension)  HLD (hyperlipidemia)  No significant past surgical history            MEDICATIONS  (STANDING):  apixaban 5 milliGRAM(s) Oral every 12 hours  ascorbic acid 500 milliGRAM(s) Oral daily  cholecalciferol 1000 Unit(s) Oral daily  cyanocobalamin 1000 MICROGram(s) Oral daily  diltiazem Infusion 5 mG/Hr (5 mL/Hr) IV Continuous <Continuous>  lactobacillus acidophilus 1 Tablet(s) Oral daily  metoprolol succinate  milliGRAM(s) Oral daily  rosuvastatin 5 milliGRAM(s) Oral at bedtime  tiotropium 2.5 MICROgram(s) Inhaler 2 Puff(s) Inhalation daily    MEDICATIONS  (PRN):  albuterol    90 MICROgram(s) HFA Inhaler 2 Puff(s) Inhalation every 6 hours PRN for shortness of breath and/or wheezing  metoprolol tartrate Injectable 5 milliGRAM(s) IV Push every 6 hours PRN HR >120  senna 2 Tablet(s) Oral at bedtime PRN Constipation      FAMILY HISTORY:  FH: CAD (coronary artery disease)    FH: type 2 diabetes    FH: HTN (hypertension)        SOCIAL HISTORY:  ***    ROS:     A comprehensive review of systems was performed and pertinent items are noted in the history above.        Vital Signs Last 24 Hrs  T(C): 36.4 (02 Sep 2024 08:05), Max: 36.8 (01 Sep 2024 20:00)  T(F): 97.5 (02 Sep 2024 08:05), Max: 98.2 (01 Sep 2024 20:00)  HR: 95 (02 Sep 2024 08:05) (72 - 134)  BP: 110/84 (02 Sep 2024 08:05) (110/84 - 141/115)  BP(mean): 117 (01 Sep 2024 18:44) (117 - 117)  RR: 17 (02 Sep 2024 08:05) (17 - 18)  SpO2: 99% (02 Sep 2024 08:05) (93% - 99%)    Parameters below as of 02 Sep 2024 08:05  Patient On (Oxygen Delivery Method): room air         LABS:                Lipid Panel  142  46  --  99    Pro BNP  -- 09-01 @ 12:05  D Dimer  <150 09-01 @ 12:05        RADIOLOGY & ADDITIONAL STUDIES:  ECHO  < from: TTE Limited W or WO Ultrasound Enhancing Agent (08.31.24 @ 10:37) >      1. Left ventricular systolic function is moderately decreased with an ejection fraction visually estimated at 35 to 40 %.   2. Unable to accurately assess segmental wall motion abnormalities due to irregular rhythm.   3. No pericardial effusion seen.    ________________________________________________________________________________________  FINDINGS:     Left Ventricle:  After obtaining consent, Definity ultrasound enhancing agent was given for enhanced left ventricular opacification and improved delineation of the left ventricular endocardial borders. Left ventricular systolicfunction is moderately decreased with an ejection fraction visually estimated at 35 to 40%. Unable to assess left ventricular diastolic function due to insufficient data. Unable to accurately assess segmental wall motion abnormalities due to irregular rhythm.     Pericardium:  No pericardial effusion seen.    < end of copied text >

## 2024-09-03 LAB
ALBUMIN SERPL ELPH-MCNC: 3.3 G/DL — SIGNIFICANT CHANGE UP (ref 3.3–5)
ALP SERPL-CCNC: 66 U/L — SIGNIFICANT CHANGE UP (ref 40–120)
ALT FLD-CCNC: 67 U/L — SIGNIFICANT CHANGE UP (ref 12–78)
ANION GAP SERPL CALC-SCNC: 6 MMOL/L — SIGNIFICANT CHANGE UP (ref 5–17)
ANION GAP SERPL CALC-SCNC: 7 MMOL/L — SIGNIFICANT CHANGE UP (ref 5–17)
AST SERPL-CCNC: 34 U/L — SIGNIFICANT CHANGE UP (ref 15–37)
BILIRUB SERPL-MCNC: 0.3 MG/DL — SIGNIFICANT CHANGE UP (ref 0.2–1.2)
BUN SERPL-MCNC: 22 MG/DL — SIGNIFICANT CHANGE UP (ref 7–23)
BUN SERPL-MCNC: 27 MG/DL — HIGH (ref 7–23)
CALCIUM SERPL-MCNC: 9.1 MG/DL — SIGNIFICANT CHANGE UP (ref 8.5–10.1)
CALCIUM SERPL-MCNC: 9.2 MG/DL — SIGNIFICANT CHANGE UP (ref 8.5–10.1)
CHLORIDE SERPL-SCNC: 110 MMOL/L — HIGH (ref 96–108)
CHLORIDE SERPL-SCNC: 111 MMOL/L — HIGH (ref 96–108)
CO2 SERPL-SCNC: 23 MMOL/L — SIGNIFICANT CHANGE UP (ref 22–31)
CO2 SERPL-SCNC: 24 MMOL/L — SIGNIFICANT CHANGE UP (ref 22–31)
CREAT SERPL-MCNC: 1.33 MG/DL — HIGH (ref 0.5–1.3)
CREAT SERPL-MCNC: 1.55 MG/DL — HIGH (ref 0.5–1.3)
EGFR: 59 ML/MIN/1.73M2 — LOW
EGFR: 71 ML/MIN/1.73M2 — SIGNIFICANT CHANGE UP
GLUCOSE BLDC GLUCOMTR-MCNC: 140 MG/DL — HIGH (ref 70–99)
GLUCOSE SERPL-MCNC: 133 MG/DL — HIGH (ref 70–99)
GLUCOSE SERPL-MCNC: 142 MG/DL — HIGH (ref 70–99)
MAGNESIUM SERPL-MCNC: 2.1 MG/DL — SIGNIFICANT CHANGE UP (ref 1.6–2.6)
MAGNESIUM SERPL-MCNC: 2.2 MG/DL — SIGNIFICANT CHANGE UP (ref 1.6–2.6)
PHOSPHATE SERPL-MCNC: 4.2 MG/DL — SIGNIFICANT CHANGE UP (ref 2.5–4.5)
POTASSIUM SERPL-MCNC: 4 MMOL/L — SIGNIFICANT CHANGE UP (ref 3.5–5.3)
POTASSIUM SERPL-MCNC: 4.1 MMOL/L — SIGNIFICANT CHANGE UP (ref 3.5–5.3)
POTASSIUM SERPL-SCNC: 4 MMOL/L — SIGNIFICANT CHANGE UP (ref 3.5–5.3)
POTASSIUM SERPL-SCNC: 4.1 MMOL/L — SIGNIFICANT CHANGE UP (ref 3.5–5.3)
PROT SERPL-MCNC: 6.9 GM/DL — SIGNIFICANT CHANGE UP (ref 6–8.3)
SODIUM SERPL-SCNC: 139 MMOL/L — SIGNIFICANT CHANGE UP (ref 135–145)
SODIUM SERPL-SCNC: 142 MMOL/L — SIGNIFICANT CHANGE UP (ref 135–145)

## 2024-09-03 PROCEDURE — 99233 SBSQ HOSP IP/OBS HIGH 50: CPT

## 2024-09-03 RX ORDER — GLUCAGON INJECTION, SOLUTION 1 MG/.2ML
1 INJECTION, SOLUTION SUBCUTANEOUS ONCE
Refills: 0 | Status: DISCONTINUED | OUTPATIENT
Start: 2024-09-03 | End: 2024-09-07

## 2024-09-03 RX ORDER — DEXTROSE 15 G/33 G
15 GEL IN PACKET (GRAM) ORAL ONCE
Refills: 0 | Status: DISCONTINUED | OUTPATIENT
Start: 2024-09-03 | End: 2024-09-07

## 2024-09-03 RX ORDER — DEXTROSE 15 G/33 G
12.5 GEL IN PACKET (GRAM) ORAL ONCE
Refills: 0 | Status: DISCONTINUED | OUTPATIENT
Start: 2024-09-03 | End: 2024-09-07

## 2024-09-03 RX ORDER — DEXTROSE 15 G/33 G
25 GEL IN PACKET (GRAM) ORAL ONCE
Refills: 0 | Status: DISCONTINUED | OUTPATIENT
Start: 2024-09-03 | End: 2024-09-07

## 2024-09-03 RX ORDER — DILTIAZEM HYDROCHLORIDE 5 MG/ML
240 INJECTION INTRAVENOUS DAILY
Refills: 0 | Status: DISCONTINUED | OUTPATIENT
Start: 2024-09-03 | End: 2024-09-03

## 2024-09-03 RX ORDER — SACUBITRIL AND VALSARTAN 49; 51 MG/1; MG/1
1 TABLET, FILM COATED ORAL
Refills: 0 | Status: DISCONTINUED | OUTPATIENT
Start: 2024-09-03 | End: 2024-09-08

## 2024-09-03 RX ADMIN — APIXABAN 5 MILLIGRAM(S): 5 TABLET, FILM COATED ORAL at 21:09

## 2024-09-03 RX ADMIN — Medication 1000 MICROGRAM(S): at 10:48

## 2024-09-03 RX ADMIN — Medication 1000 UNIT(S): at 10:48

## 2024-09-03 RX ADMIN — SACUBITRIL AND VALSARTAN 1 TABLET(S): 49; 51 TABLET, FILM COATED ORAL at 21:09

## 2024-09-03 RX ADMIN — APIXABAN 5 MILLIGRAM(S): 5 TABLET, FILM COATED ORAL at 10:47

## 2024-09-03 RX ADMIN — TIOTROPIUM BROMIDE INHALATION SPRAY 2 PUFF(S): 3.12 SPRAY, METERED RESPIRATORY (INHALATION) at 10:05

## 2024-09-03 RX ADMIN — ROSUVASTATIN CALCIUM 5 MILLIGRAM(S): 10 TABLET ORAL at 21:09

## 2024-09-03 RX ADMIN — METOPROLOL TARTRATE 200 MILLIGRAM(S): 100 TABLET ORAL at 10:47

## 2024-09-03 RX ADMIN — Medication 500 MILLIGRAM(S): at 10:48

## 2024-09-03 RX ADMIN — Medication 1 TABLET(S): at 10:48

## 2024-09-03 NOTE — PROVIDER CONTACT NOTE (OTHER) - REASON
Decrease your lisinopril to 5 mg daily - take 1/2 pills daily of current 10 mg dose till you run out then start the 5 mg pills called in today.
7.65 Second Pause
4.8 sec pause

## 2024-09-03 NOTE — PROGRESS NOTE ADULT - PROBLEM SELECTOR PLAN 2
Non Ischemic CM (Coronary CT angio as OP with normal coronaries calcium score 0)  LV dysfunction possibly d/t tachy arrythmia VS ETOH, at this I recommend suspending CCB in setting of low EF CW BB ,begin Entresto 24/26 MG BID, CW BB    Clinically Euvolemic   Maintain daily weight Non Ischemic CM (Coronary CT angio as OP with normal coronaries calcium score 0)  LV dysfunction possibly d/t tachy arrythmia VS ETOH, at this I recommend suspending CCB in setting of low EF, begin Entresto 24/26 MG BID, CW BB  Clinically Euvolemic   Maintain daily weight

## 2024-09-03 NOTE — PROGRESS NOTE ADULT - ASSESSMENT
#Rapid afib  #Non ischemic cardiomyopathy  #MARIANELA  #DM/Obesity.  #HTN  - TSH: wnl   - EF unchanged 35-40%  - Metoprolol Succinate 200mg ER   - On Cardizem gtt   - Eliquis   - hold losartan, spironolactone until Scr normalizes  - SERA/DCCV   - Will need to started on Tikosyn afterwards   - lifestyle modifications and weight loss advised   - RISS  - Cardiology and EP following   - DVT proph on eliquis  - Full Code

## 2024-09-03 NOTE — PROGRESS NOTE ADULT - NS ATTEND BILL GEN_ALL_CORE
We are committed to providing you with the best care possible. In order to help us achieve these goals please remember to bring all medications, herbal products, and over the counter supplements with you to each visit. If your provider has ordered testing for you, please be sure to follow up with our office if you have not received results within 7 days after the testing took place. *If you receive a survey after visiting one of our offices, please take time to share your experience concerning your physician office visit. These surveys are confidential and no health information about you is shared. We are eager to improve for you and we are counting on your feedback to help make that happen. · Keep a list of your medicines with you. List all of the prescription medicines, nonprescription medicines, supplements, natural remedies, and vitamins that you take. Tell your healthcare providers who treat you about all of the products you are taking. Your provider can provide you with a form to keep track of them. Just ask. · Follow the directions that come with your medicine, including information about food or alcohol. Make sure you know how and when to take your medicine. Do not take more or less than you are supposed to take. · Keep all medicines out of the reach of children. · Store medicines according to the directions on the label. · Monitor yourself. Learn to know how your body reacts to your new medicine and keep track of how it makes you feel before attempting (If your provider has allowed you to do so) to drive or go to work. · Seek emergency medical attention if you think you have used too much of this medicine. An overdose of any prescription medicine can be fatal. Overdose symptoms may include extreme drowsiness, muscle weakness, confusion, cold and clammy skin, pinpoint pupils, shallow breathing, slow heart rate, fainting, or coma.   · Don't share prescription medicines with others, even when
Attending to bill

## 2024-09-03 NOTE — CHART NOTE - NSCHARTNOTEFT_GEN_A_CORE
This is a 38-year-old male with hypertension diabetes obesity obstructive sleep apnea who experienced his first episode of atrial fibrillation in March 2024.  He presents with recurrent atrial fibrillation and has a rapid ventricular response he had several alcoholic beverages on the night prior to the episode and smokes cigarettes as well.  He has missed doses of his anticoagulation.      Atrial fibrillation: This is a second persistent episode of atrial fibrillation in this gentleman at this time would recommend a BESSY cardioversion with BSESY to clear his left atrial appendage of clot given his [id number] score of 2.  And then initiation of antiarrhythmic therapy with Tikosyn starting to 50 mcg twice a day and following of QT intervals    9/3/24: Patient scheduled for BESSY/DCCV but postponed until Thursday 9/5.  Will discuss plan for AAD (tikosyn) after bessy/dccv with patient and Dr. Abraham  Confirmed Tikosyn co-pay will be $0  Further EP recommendations pending BESSY/DCCV  Continue with rate control at this time

## 2024-09-04 LAB
A1C WITH ESTIMATED AVERAGE GLUCOSE RESULT: 6.8 % — HIGH (ref 4–5.6)
ANION GAP SERPL CALC-SCNC: 4 MMOL/L — LOW (ref 5–17)
BUN SERPL-MCNC: 20 MG/DL — SIGNIFICANT CHANGE UP (ref 7–23)
CALCIUM SERPL-MCNC: 9.2 MG/DL — SIGNIFICANT CHANGE UP (ref 8.5–10.1)
CHLORIDE SERPL-SCNC: 112 MMOL/L — HIGH (ref 96–108)
CO2 SERPL-SCNC: 23 MMOL/L — SIGNIFICANT CHANGE UP (ref 22–31)
CREAT SERPL-MCNC: 1.38 MG/DL — HIGH (ref 0.5–1.3)
EGFR: 68 ML/MIN/1.73M2 — SIGNIFICANT CHANGE UP
ESTIMATED AVERAGE GLUCOSE: 148 MG/DL — HIGH (ref 68–114)
GLUCOSE BLDC GLUCOMTR-MCNC: 109 MG/DL — HIGH (ref 70–99)
GLUCOSE BLDC GLUCOMTR-MCNC: 120 MG/DL — HIGH (ref 70–99)
GLUCOSE BLDC GLUCOMTR-MCNC: 122 MG/DL — HIGH (ref 70–99)
GLUCOSE BLDC GLUCOMTR-MCNC: 137 MG/DL — HIGH (ref 70–99)
GLUCOSE SERPL-MCNC: 134 MG/DL — HIGH (ref 70–99)
MAGNESIUM SERPL-MCNC: 2.2 MG/DL — SIGNIFICANT CHANGE UP (ref 1.6–2.6)
PHOSPHATE SERPL-MCNC: 3.4 MG/DL — SIGNIFICANT CHANGE UP (ref 2.5–4.5)
POTASSIUM SERPL-MCNC: 4.1 MMOL/L — SIGNIFICANT CHANGE UP (ref 3.5–5.3)
POTASSIUM SERPL-SCNC: 4.1 MMOL/L — SIGNIFICANT CHANGE UP (ref 3.5–5.3)
SODIUM SERPL-SCNC: 139 MMOL/L — SIGNIFICANT CHANGE UP (ref 135–145)

## 2024-09-04 PROCEDURE — 99233 SBSQ HOSP IP/OBS HIGH 50: CPT

## 2024-09-04 RX ORDER — SPIRONOLACTONE 25 MG/1
25 TABLET, FILM COATED ORAL DAILY
Refills: 0 | Status: DISCONTINUED | OUTPATIENT
Start: 2024-09-04 | End: 2024-09-08

## 2024-09-04 RX ADMIN — TIOTROPIUM BROMIDE INHALATION SPRAY 2 PUFF(S): 3.12 SPRAY, METERED RESPIRATORY (INHALATION) at 10:12

## 2024-09-04 RX ADMIN — METOPROLOL TARTRATE 5 MILLIGRAM(S): 100 TABLET ORAL at 17:27

## 2024-09-04 RX ADMIN — Medication 1000 UNIT(S): at 09:09

## 2024-09-04 RX ADMIN — APIXABAN 5 MILLIGRAM(S): 5 TABLET, FILM COATED ORAL at 21:27

## 2024-09-04 RX ADMIN — Medication 1000 MICROGRAM(S): at 09:09

## 2024-09-04 RX ADMIN — METOPROLOL TARTRATE 200 MILLIGRAM(S): 100 TABLET ORAL at 09:10

## 2024-09-04 RX ADMIN — APIXABAN 5 MILLIGRAM(S): 5 TABLET, FILM COATED ORAL at 09:09

## 2024-09-04 RX ADMIN — SACUBITRIL AND VALSARTAN 1 TABLET(S): 49; 51 TABLET, FILM COATED ORAL at 21:27

## 2024-09-04 RX ADMIN — Medication 1 TABLET(S): at 09:09

## 2024-09-04 RX ADMIN — SPIRONOLACTONE 25 MILLIGRAM(S): 25 TABLET, FILM COATED ORAL at 10:34

## 2024-09-04 RX ADMIN — ROSUVASTATIN CALCIUM 5 MILLIGRAM(S): 10 TABLET ORAL at 21:27

## 2024-09-04 RX ADMIN — Medication 500 MILLIGRAM(S): at 09:09

## 2024-09-04 RX ADMIN — SACUBITRIL AND VALSARTAN 1 TABLET(S): 49; 51 TABLET, FILM COATED ORAL at 09:10

## 2024-09-04 NOTE — CHART NOTE - NSCHARTNOTEFT_GEN_A_CORE
This is a 38-year-old male with hypertension diabetes obesity obstructive sleep apnea who experienced his first episode of atrial fibrillation in March 2024.  He presents with recurrent atrial fibrillation and has a rapid ventricular response he had several alcoholic beverages on the night prior to the episode and smokes cigarettes as well.  He has missed doses of his anticoagulation.      Atrial fibrillation: This is a second persistent episode of atrial fibrillation in this gentleman at this time would recommend a SERA cardioversion with SERA to clear his left atrial appendage of clot given his [id number] score of 2.  And then initiation of antiarrhythmic therapy with Tikosyn starting to 50 mcg twice a day and following of QT intervals   9/4/24: Patient remains in AF with rates up to 110bpm.  Patient seen at bedside today without cardiac complaints.  Planned for SERA/DCCV tomorrow.  Discussed with patient that we recommend AAD, Tikosyn, post DCCV but will require another three days in hospital for initiation. Patient is agreeable to staying for initiation of Tikosyn.  Copay confirmed $0.  Will plan for first dose of Tikosyn tomorrow evening 9/5 after SERA/DCCV pending no thrombus This is a 38-year-old male with hypertension diabetes obesity obstructive sleep apnea who experienced his first episode of atrial fibrillation in March 2024.  He presents with recurrent atrial fibrillation and has a rapid ventricular response he had several alcoholic beverages on the night prior to the episode and smokes cigarettes as well.  He has missed doses of his anticoagulation.      Atrial fibrillation: This is a second persistent episode of atrial fibrillation in this gentleman at this time would recommend a SERA cardioversion with SERA to clear his left atrial appendage of clot given his [id number] score of 2.  And then initiation of antiarrhythmic therapy with Tikosyn starting to 50 mcg twice a day and following of QT intervals   9/4/24: Patient remains in AF with rates up to 110bpm.  Patient seen at bedside today without cardiac complaints.  Planned for SERA/DCCV tomorrow.  Discussed with patient that we recommend AAD, Tikosyn, post DCCV but will require another three days in hospital for initiation. Patient is agreeable to staying for initiation of Tikosyn.  Copay confirmed $0.  Will plan for first dose of Tikosyn tomorrow evening 9/5 after SERA/DCCV pending no thrombus  Will check BMP and Mag tomorrow AM

## 2024-09-04 NOTE — PROGRESS NOTE ADULT - PROBLEM SELECTOR PLAN 2
Non Ischemic CM (Coronary CT angio as OP with normal coronaries calcium score 0)  LV dysfunction possibly d/t tachy arrythmia VS ETOH, at this I recommend suspending CCB in setting of low EF, begin Entresto 24/26 MG BID, CW BB  Clinically Euvolemic   Maintain daily weight

## 2024-09-04 NOTE — PROGRESS NOTE ADULT - TIME BILLING
I spent a total of 75 minutes on the date of this encounter coordinating the patient's care. This includes reviewing documentation pertinent to this admission, results and imaging in addition to completing a history and physical examination on the patient. Further tests, medications, and procedures have been ordered as indicated. Laboratory results and the plan of care were communicated to the patient and or their family member. Supporting documentation was completed and added to the patient's chart.
extensive review of patient's medical chart including prior hospital encounters, current admission progress notes, labs, imaging and other testing, seeing and evaluating patient at bedside, explaining to patient, patient's mother regarding his current condition and plan of care, then ordering tests and collaborating with specialty consultants including Cardiology, Cardiac Electrophysiology, and finally, documenting today's findings and plan.
I spent a total of 75 minutes on the date of this encounter coordinating the patient's care. This includes reviewing documentation pertinent to this admission, results and imaging in addition to completing a history and physical examination on the patient. Further tests, medications, and procedures have been ordered as indicated. Laboratory results and the plan of care were communicated to the patient and or their family member. Supporting documentation was completed and added to the patient's chart.
I spent a total of 75 minutes on the date of this encounter coordinating the patient's care. This includes reviewing documentation pertinent to this admission, results and imaging in addition to completing a history and physical examination on the patient. Further tests, medications, and procedures have been ordered as indicated. Laboratory results and the plan of care were communicated to the patient and or their family member. Supporting documentation was completed and added to the patient's chart.

## 2024-09-04 NOTE — PROGRESS NOTE ADULT - ASSESSMENT
37 yo man with obesity, PAO, DM, HTN, afib since COVID 3/2024 s/p DCCV on Eliquis, awoke 8/30 with palpitations, noticed his HR was highly variable on his Apple Watch, presented for further evaluation. Found to have rapid afib/flutter. Admitted to Medicine.     Paroxysmal atrial fibrillation, flutter  Patient presented in afib/flutter with RVR, treated initially with beta blocker and diltiazem drip. However, patient was noted to have pauses on telemetry, up to 4.8 sec though nocturnally and in setting of his known PAO on CPAP. Stopped diltiazem (especially as LVEF is low as well, see below). Patient admited to missing multiple PM doses of Eiquis in past, discussed importance of medication compliance. Appreciate Cardiology and Cardiac EP input.   - Continue metoprolol succinate  - Continue oral anticoagulation  - Plan for SERA DCCV tomorrow 9/5, possible anti-arrhythmic therapy with Tikosyn thereafter (cannot start prior to SERA given noncompliance w/ Eliquis).    Non ischemic cardiomyopathy  TTE with LV systolic dysfunction. Had coronary CT angio as outpatient in 6/2024, normal coronaries and calcium score 0. LV dysfunction possibly due to tachyarrythmia, also could relate to his alcohol use. Appears euvolemic. Appreciate Cardiology input  - Started Entresto 24/26 BID, spirololactone  - Continue metoprolol succinate  - Monitor Is and Os  - Trend weight  - Avoid added salt in diet    PAO on CPAP  - Continue nightly CPAP    Diabetes  A1c 6.8  - Insulin correctional scale

## 2024-09-05 ENCOUNTER — RESULT REVIEW (OUTPATIENT)
Age: 39
End: 2024-09-05

## 2024-09-05 ENCOUNTER — TRANSCRIPTION ENCOUNTER (OUTPATIENT)
Age: 39
End: 2024-09-05

## 2024-09-05 LAB
ANION GAP SERPL CALC-SCNC: 7 MMOL/L — SIGNIFICANT CHANGE UP (ref 5–17)
BASOPHILS # BLD AUTO: 0.05 K/UL — SIGNIFICANT CHANGE UP (ref 0–0.2)
BASOPHILS NFR BLD AUTO: 1 % — SIGNIFICANT CHANGE UP (ref 0–2)
BUN SERPL-MCNC: 20 MG/DL — SIGNIFICANT CHANGE UP (ref 7–23)
CALCIUM SERPL-MCNC: 8.7 MG/DL — SIGNIFICANT CHANGE UP (ref 8.5–10.1)
CHLORIDE SERPL-SCNC: 111 MMOL/L — HIGH (ref 96–108)
CO2 SERPL-SCNC: 23 MMOL/L — SIGNIFICANT CHANGE UP (ref 22–31)
CREAT SERPL-MCNC: 1.43 MG/DL — HIGH (ref 0.5–1.3)
EGFR: 65 ML/MIN/1.73M2 — SIGNIFICANT CHANGE UP
EOSINOPHIL # BLD AUTO: 0.13 K/UL — SIGNIFICANT CHANGE UP (ref 0–0.5)
EOSINOPHIL NFR BLD AUTO: 2.6 % — SIGNIFICANT CHANGE UP (ref 0–6)
GLUCOSE BLDC GLUCOMTR-MCNC: 114 MG/DL — HIGH (ref 70–99)
GLUCOSE BLDC GLUCOMTR-MCNC: 150 MG/DL — HIGH (ref 70–99)
GLUCOSE BLDC GLUCOMTR-MCNC: 96 MG/DL — SIGNIFICANT CHANGE UP (ref 70–99)
GLUCOSE BLDC GLUCOMTR-MCNC: 97 MG/DL — SIGNIFICANT CHANGE UP (ref 70–99)
GLUCOSE SERPL-MCNC: 132 MG/DL — HIGH (ref 70–99)
HCT VFR BLD CALC: 46.9 % — SIGNIFICANT CHANGE UP (ref 39–50)
HGB BLD-MCNC: 15.9 G/DL — SIGNIFICANT CHANGE UP (ref 13–17)
IMM GRANULOCYTES NFR BLD AUTO: 0.4 % — SIGNIFICANT CHANGE UP (ref 0–0.9)
LYMPHOCYTES # BLD AUTO: 1.93 K/UL — SIGNIFICANT CHANGE UP (ref 1–3.3)
LYMPHOCYTES # BLD AUTO: 38.9 % — SIGNIFICANT CHANGE UP (ref 13–44)
MAGNESIUM SERPL-MCNC: 2.1 MG/DL — SIGNIFICANT CHANGE UP (ref 1.6–2.6)
MCHC RBC-ENTMCNC: 30.4 PG — SIGNIFICANT CHANGE UP (ref 27–34)
MCHC RBC-ENTMCNC: 33.9 GM/DL — SIGNIFICANT CHANGE UP (ref 32–36)
MCV RBC AUTO: 89.7 FL — SIGNIFICANT CHANGE UP (ref 80–100)
MONOCYTES # BLD AUTO: 0.6 K/UL — SIGNIFICANT CHANGE UP (ref 0–0.9)
MONOCYTES NFR BLD AUTO: 12.1 % — SIGNIFICANT CHANGE UP (ref 2–14)
NEUTROPHILS # BLD AUTO: 2.23 K/UL — SIGNIFICANT CHANGE UP (ref 1.8–7.4)
NEUTROPHILS NFR BLD AUTO: 45 % — SIGNIFICANT CHANGE UP (ref 43–77)
PHOSPHATE SERPL-MCNC: 3.5 MG/DL — SIGNIFICANT CHANGE UP (ref 2.5–4.5)
PLATELET # BLD AUTO: 208 K/UL — SIGNIFICANT CHANGE UP (ref 150–400)
POTASSIUM SERPL-MCNC: 4.1 MMOL/L — SIGNIFICANT CHANGE UP (ref 3.5–5.3)
POTASSIUM SERPL-SCNC: 4.1 MMOL/L — SIGNIFICANT CHANGE UP (ref 3.5–5.3)
RBC # BLD: 5.23 M/UL — SIGNIFICANT CHANGE UP (ref 4.2–5.8)
RBC # FLD: 16 % — HIGH (ref 10.3–14.5)
SODIUM SERPL-SCNC: 141 MMOL/L — SIGNIFICANT CHANGE UP (ref 135–145)
WBC # BLD: 4.96 K/UL — SIGNIFICANT CHANGE UP (ref 3.8–10.5)
WBC # FLD AUTO: 4.96 K/UL — SIGNIFICANT CHANGE UP (ref 3.8–10.5)

## 2024-09-05 PROCEDURE — 93325 DOPPLER ECHO COLOR FLOW MAPG: CPT | Mod: 26

## 2024-09-05 PROCEDURE — 92960 CARDIOVERSION ELECTRIC EXT: CPT

## 2024-09-05 PROCEDURE — 99232 SBSQ HOSP IP/OBS MODERATE 35: CPT

## 2024-09-05 PROCEDURE — 93010 ELECTROCARDIOGRAM REPORT: CPT

## 2024-09-05 PROCEDURE — 93320 DOPPLER ECHO COMPLETE: CPT | Mod: 26

## 2024-09-05 PROCEDURE — 93312 ECHO TRANSESOPHAGEAL: CPT | Mod: 26

## 2024-09-05 RX ORDER — DOFETILIDE 250 UG/1
250 CAPSULE ORAL EVERY 12 HOURS
Refills: 0 | Status: DISCONTINUED | OUTPATIENT
Start: 2024-09-05 | End: 2024-09-08

## 2024-09-05 RX ORDER — MAGNESIUM OXIDE TAB 400 MG (240 MG ELEMENTAL MG) 400 (240 MG) MG
400 TAB ORAL AT BEDTIME
Refills: 0 | Status: DISCONTINUED | OUTPATIENT
Start: 2024-09-05 | End: 2024-09-08

## 2024-09-05 RX ORDER — DOFETILIDE 250 UG/1
250 CAPSULE ORAL ONCE
Refills: 0 | Status: COMPLETED | OUTPATIENT
Start: 2024-09-05 | End: 2024-09-05

## 2024-09-05 RX ORDER — METOPROLOL TARTRATE 100 MG/1
100 TABLET ORAL DAILY
Refills: 0 | Status: DISCONTINUED | OUTPATIENT
Start: 2024-09-05 | End: 2024-09-08

## 2024-09-05 RX ADMIN — Medication 1000 UNIT(S): at 11:37

## 2024-09-05 RX ADMIN — Medication 1000 MICROGRAM(S): at 11:37

## 2024-09-05 RX ADMIN — MAGNESIUM OXIDE TAB 400 MG (240 MG ELEMENTAL MG) 400 MILLIGRAM(S): 400 (240 MG) TAB at 22:00

## 2024-09-05 RX ADMIN — SPIRONOLACTONE 25 MILLIGRAM(S): 25 TABLET, FILM COATED ORAL at 11:38

## 2024-09-05 RX ADMIN — METOPROLOL TARTRATE 100 MILLIGRAM(S): 100 TABLET ORAL at 11:41

## 2024-09-05 RX ADMIN — APIXABAN 5 MILLIGRAM(S): 5 TABLET, FILM COATED ORAL at 22:00

## 2024-09-05 RX ADMIN — SACUBITRIL AND VALSARTAN 1 TABLET(S): 49; 51 TABLET, FILM COATED ORAL at 11:38

## 2024-09-05 RX ADMIN — ROSUVASTATIN CALCIUM 5 MILLIGRAM(S): 10 TABLET ORAL at 22:01

## 2024-09-05 RX ADMIN — Medication 500 MILLIGRAM(S): at 11:37

## 2024-09-05 RX ADMIN — Medication 1 TABLET(S): at 11:36

## 2024-09-05 RX ADMIN — APIXABAN 5 MILLIGRAM(S): 5 TABLET, FILM COATED ORAL at 11:37

## 2024-09-05 RX ADMIN — DOFETILIDE 250 MICROGRAM(S): 250 CAPSULE ORAL at 22:00

## 2024-09-05 RX ADMIN — SACUBITRIL AND VALSARTAN 1 TABLET(S): 49; 51 TABLET, FILM COATED ORAL at 22:00

## 2024-09-05 RX ADMIN — DOFETILIDE 250 MICROGRAM(S): 250 CAPSULE ORAL at 11:36

## 2024-09-05 NOTE — DISCHARGE NOTE NURSING/CASE MANAGEMENT/SOCIAL WORK - NSDCFUADDAPPT_GEN_ALL_CORE_FT
FOLLOW UP APPT IS ON 9/13/24 @ 1:300PM WITH DR SYED SOLIS  FOLLOW UP APPT IS ON 9/13/24 @ 1:30PM WITH DR SYED SOLIS .

## 2024-09-05 NOTE — DISCHARGE NOTE NURSING/CASE MANAGEMENT/SOCIAL WORK - NSDCPEEMAIL_GEN_ALL_CORE
[facility] for Tobacco Control email [email or web address]

## 2024-09-05 NOTE — PACU DISCHARGE NOTE - COMMENTS
Pt. S/P SERA/CV- Pt. tolerated procedure well. No acute distress at this time- Pt. denies CP and SOB- Report given to Aide FANG @  [facility]- Pt. placed on cardiac monitor- safety maintained

## 2024-09-05 NOTE — CHART NOTE - NSCHARTNOTEFT_GEN_A_CORE
Procedure: SERA, DCCV    Indication: Atrial fibrillation    Findings: No MARSHALL thrombus.  See full report for all findings.    Successful cardioversion of AF w/ RVR to Sinus rhythm with single 200J shock.    Patient tolerated procedure.

## 2024-09-05 NOTE — DISCHARGE NOTE NURSING/CASE MANAGEMENT/SOCIAL WORK - PATIENT PORTAL LINK FT
You can access the FollowMyHealth Patient Portal offered by [facility] by registering at the following website: [email or web address]. By joining [employer]’s FollowMyHealth portal, you will also be able to view your health information using other applications (apps) compatible with our system.

## 2024-09-05 NOTE — PROCEDURAL SAFETY CHECKLIST WITH OR WITHOUT SEDATION - NSPOSTCOMMENTFT_GEN_ALL_CORE
Inpatient bedside SERA/CV performed. All safety equipment in place and ready at bedside. Brief/Time out performed at bedside with all team members present @1006 a, anesthesia start time @1007a, probe in @1010a,, probe out @ 1018a. CVV performed at @1019a, (200J) administered, (1)shock given, pt now in (sinus with PVCs). Anesthesia stop time @1025a.  Dr Marin discussed findings with patient and family. Will continue to monitor until discharge.

## 2024-09-05 NOTE — PROGRESS NOTE ADULT - ASSESSMENT
39 yo man with obesity, PAO, DM, HTN, afib since COVID 3/2024 s/p DCCV on Eliquis, awoke 8/30 with palpitations, noticed his HR was highly variable on his Apple Watch, presented for further evaluation. Found to have rapid afib/flutter. Admitted to Medicine.     Paroxysmal atrial fibrillation, flutter  Patient presented in afib/flutter with RVR, treated initially with beta blocker and diltiazem drip. However, patient was noted to have pauses on telemetry, up to 4.8 sec though nocturnally and in setting of his known PAO on CPAP. Stopped diltiazem (especially as LVEF is low as well, see below). Patient admitted to missing multiple PM doses of Eiquis in past, discussed importance of medication compliance. Appreciate Cardiology and Cardiac EP input. S/p SERA/DCCV to NSR today. Patient states that he is feeling better, denies chest pain/SOB/palpitations. EKG SR 77bpm WI 152ms, QRS 100ms, QTC 450ms. Tele SR 70s.   - Starting Tikosyn today 250mcg po q12hrs. 12-lead EKG in 2hrs post-dose for QTc monitoring, will need 6 doses inpatient loading  - Call EP if QTc>500ms or >50ms from baseline  - Avoid QT prolonging agents  - Keep K+>4, Mg>2.   - Decreased metoprolol succinate to 100mg daily  - Continue uninterrupted Eliquis  - Will consider AF ablation as outpatient    Non ischemic cardiomyopathy  TTE with LV systolic dysfunction. Had coronary CT angio as outpatient in 6/2024, normal coronaries and calcium score 0. LV dysfunction possibly due to tachyarrhythmia also could relate to his alcohol use. Appears euvolemic. Appreciate Cardiology input  - Entresto 24/26 BID, spironolactone, metoprolol succinate (reduced to 100 daily)  - Monitor Is and Os  - Trend weight  - Avoid added salt in diet  - Plan to repeat echo as outpatient while in SR    PAO on CPAP  - Continue nightly CPAP    Diabetes  A1c 6.8  - Insulin correctional scale

## 2024-09-05 NOTE — PROGRESS NOTE ADULT - ASSESSMENT
39 yo M with above PMHx presented with symptomatic PAF w/RVR.  Known Nonischemic CMP LVEF 35-40%   - start tikosyn 250mcg po q12hrs.12 leads EKG in 2 hrs post each dose for QTC.  -call EP if QTC>500ms or >50ms from baseline.  - Avoid QT prolonging agents. keep K+>4, Mg>2. Pt needs 6 doses in-pt loading ( No Copay for pt)   - decrease toprol to 100mg daily  - uninterrupted eliquis  - GDMT for chronic HFrEF, will repeat echo as outpt while in SR  - will consider AF ablation as out pt  Plan d/w pt//cardio/hospitalist

## 2024-09-05 NOTE — PRE-OP CHECKLIST - LATEX ALLERGY
[facility] Sleep [facility]      Lorelei Brink         [id number]  5/2/2024   Chief Complaint   Patient presents with    Follow-up     [facility]         Pt of Dr. Maurilio LEDESMA Download:   Original or initial AHI: 28.5     Date of initial study: 9/13/22      Compliant  30%     Noncompliant 20%     PAP Type AutoSet Level  Min 5cmH20 Max 67qbP92   Avg Hrs/Day 4 hours 57 minutes  AHI: 1.0   Leaks : 95 th percentile: 0.8   Recorded compliance dates 3/31/24-4/29/24   Machine/Mfg:   [x] ResMed    [] Respironics/Dreamstation   Interface:   [] Nasal    [] Nasal pillows   [x] FFM      Provider:      [x] [facility]     [][employer]     [] [employer]    [] [employer]    [] [facility]               [] [employer]      [] [employer]    [] B[employer]:      [] Other    Neck Size: 14 inches  Mallampati 4  ESS:  14  SAQLI: 49    Here is a scan of the most recent download:                      Presentation:   Lorelei RAE presents for 1 yearsle medicine follow up for obstructive sleep apnea  Since the last visit, Lorelei RAE is struggling with use . C/o thinning of her hair from headgear and pinching around her nose. C/o chronic fatigue and sleepiness during the day with mornings being with worst . . Napping during the day usually several times per day with no benefit from her naps  Follows with Dr Machuca for iron deficiency anemia       Equipment issues:  The pressure is  acceptable, the mask is unacceptable     Review of Systems -   Review of Systems   Constitutional:  Positive for fatigue.   Musculoskeletal:  Positive for arthralgias.   Psychiatric/Behavioral:  Positive for dysphoric mood.    All other systems reviewed and are negative.       Physical Exam:    BMI:  Body mass index is 33.89 kg/m².    Wt Readings from Last 3 Encounters:   05/02/24 95.3 kg (210 lb)   05/01/24 93.9 kg (207 lb)   04/17/24 96.6 kg (213 lb)     Weight stable / unchanged  Vitals: /64 (Site: Right Upper Arm, 
no

## 2024-09-05 NOTE — DISCHARGE NOTE NURSING/CASE MANAGEMENT/SOCIAL WORK - NSDCPEFALRISK_GEN_ALL_CORE
For information on Fall & Injury Prevention, visit: [email or web address] OR  [email or web address] OR  https://www.cdc.gov/steadi/patient.html

## 2024-09-05 NOTE — DISCHARGE NOTE NURSING/CASE MANAGEMENT/SOCIAL WORK - NSDCPEWEB_GEN_ALL_CORE
[facility] for Tobacco Control website --- [email or web address] website --- [email or web address]

## 2024-09-06 LAB
ANION GAP SERPL CALC-SCNC: 7 MMOL/L — SIGNIFICANT CHANGE UP (ref 5–17)
BUN SERPL-MCNC: 20 MG/DL — SIGNIFICANT CHANGE UP (ref 7–23)
CALCIUM SERPL-MCNC: 8.9 MG/DL — SIGNIFICANT CHANGE UP (ref 8.5–10.1)
CHLORIDE SERPL-SCNC: 112 MMOL/L — HIGH (ref 96–108)
CO2 SERPL-SCNC: 23 MMOL/L — SIGNIFICANT CHANGE UP (ref 22–31)
CREAT SERPL-MCNC: 1.35 MG/DL — HIGH (ref 0.5–1.3)
EGFR: 70 ML/MIN/1.73M2 — SIGNIFICANT CHANGE UP
GLUCOSE BLDC GLUCOMTR-MCNC: 103 MG/DL — HIGH (ref 70–99)
GLUCOSE BLDC GLUCOMTR-MCNC: 124 MG/DL — HIGH (ref 70–99)
GLUCOSE BLDC GLUCOMTR-MCNC: 89 MG/DL — SIGNIFICANT CHANGE UP (ref 70–99)
GLUCOSE SERPL-MCNC: 135 MG/DL — HIGH (ref 70–99)
MAGNESIUM SERPL-MCNC: 2.3 MG/DL — SIGNIFICANT CHANGE UP (ref 1.6–2.6)
PHOSPHATE SERPL-MCNC: 3.4 MG/DL — SIGNIFICANT CHANGE UP (ref 2.5–4.5)
POTASSIUM SERPL-MCNC: 4.1 MMOL/L — SIGNIFICANT CHANGE UP (ref 3.5–5.3)
POTASSIUM SERPL-SCNC: 4.1 MMOL/L — SIGNIFICANT CHANGE UP (ref 3.5–5.3)
SODIUM SERPL-SCNC: 142 MMOL/L — SIGNIFICANT CHANGE UP (ref 135–145)

## 2024-09-06 PROCEDURE — 99232 SBSQ HOSP IP/OBS MODERATE 35: CPT

## 2024-09-06 PROCEDURE — 93010 ELECTROCARDIOGRAM REPORT: CPT

## 2024-09-06 RX ADMIN — Medication 1000 MICROGRAM(S): at 10:00

## 2024-09-06 RX ADMIN — SACUBITRIL AND VALSARTAN 1 TABLET(S): 49; 51 TABLET, FILM COATED ORAL at 09:59

## 2024-09-06 RX ADMIN — METOPROLOL TARTRATE 100 MILLIGRAM(S): 100 TABLET ORAL at 10:01

## 2024-09-06 RX ADMIN — APIXABAN 5 MILLIGRAM(S): 5 TABLET, FILM COATED ORAL at 22:13

## 2024-09-06 RX ADMIN — MAGNESIUM OXIDE TAB 400 MG (240 MG ELEMENTAL MG) 400 MILLIGRAM(S): 400 (240 MG) TAB at 22:13

## 2024-09-06 RX ADMIN — Medication 500 MILLIGRAM(S): at 10:01

## 2024-09-06 RX ADMIN — Medication 1 TABLET(S): at 09:59

## 2024-09-06 RX ADMIN — APIXABAN 5 MILLIGRAM(S): 5 TABLET, FILM COATED ORAL at 10:00

## 2024-09-06 RX ADMIN — ROSUVASTATIN CALCIUM 5 MILLIGRAM(S): 10 TABLET ORAL at 22:14

## 2024-09-06 RX ADMIN — Medication 1000 UNIT(S): at 10:00

## 2024-09-06 RX ADMIN — SPIRONOLACTONE 25 MILLIGRAM(S): 25 TABLET, FILM COATED ORAL at 09:59

## 2024-09-06 RX ADMIN — DOFETILIDE 250 MICROGRAM(S): 250 CAPSULE ORAL at 10:00

## 2024-09-06 RX ADMIN — DOFETILIDE 250 MICROGRAM(S): 250 CAPSULE ORAL at 22:13

## 2024-09-06 RX ADMIN — SACUBITRIL AND VALSARTAN 1 TABLET(S): 49; 51 TABLET, FILM COATED ORAL at 22:13

## 2024-09-06 RX ADMIN — TIOTROPIUM BROMIDE INHALATION SPRAY 2 PUFF(S): 3.12 SPRAY, METERED RESPIRATORY (INHALATION) at 08:50

## 2024-09-06 NOTE — PROGRESS NOTE ADULT - ASSESSMENT
37 yo M with above PMHx presented with symptomatic PAF w/RVR.  Known Nonischemic CMP LVEF 35-40%   - started tikosyn 250mcg po q12hrs on 9/5, 12 leads EKG in 2 hrs post each dose for QTC.  -call EP if QTC>500ms or >50ms from baseline.  - Avoid QT prolonging agents. keep K+>4, Mg>2. Pt needs 6 doses in-pt loading, received 3/6 dose today  ( No Copay for pt)   - continue  toprol to 100mg daily  - uninterrupted eliquis  - episodes of nocturnal junctional rhythm, pt has PAO on CPAP  - GDMT for chronic HFrEF, will repeat echo as outpt while in SR  - will consider AF ablation as out pt  - f/u in EP clinic in 1 week for EKG  Plan d/w pt//cardio/hospitalist   37 yo M with above PMHx presented with symptomatic PAF w/RVR.  Known Nonischemic CMP LVEF 35-40%   - started tikosyn 250mcg po q12hrs on 9/5, 12 leads EKG in 2 hrs post each dose for QTC.  -call EP if QTC>500ms or >50ms from baseline.  - Avoid QT prolonging agents. keep K+>4, Mg>2. Pt needs 6 doses in-pt loading, received 3/6 dose today  ( No Copay for pt)   - continue  toprol to 100mg daily  - uninterrupted eliquis  - episodes of nocturnal junctional rhythm, pt has PAO on CPAP  - GDMT for chronic HFrEF, will repeat echo as outpt while in SR  - will consider AF ablation as out pt  - f/u in EP clinic on 9/13 @10:45AM  for EKG  Plan d/w pt//cardio/hospitalist

## 2024-09-06 NOTE — PROGRESS NOTE ADULT - ASSESSMENT
37 yo man with obesity, PAO, DM, HTN, afib since COVID 3/2024 s/p DCCV on Eliquis, awoke 8/30 with palpitations, noticed his HR was highly variable on his Apple Watch, presented for further evaluation. Found to have rapid afib/flutter. Admitted to Medicine.     Paroxysmal atrial fibrillation, flutter  Patient presented in afib/flutter with RVR, treated initially with beta blocker and diltiazem drip. However, patient was noted to have pauses on telemetry, up to 4.8 sec though nocturnally and in setting of his known PAO on CPAP. Stopped diltiazem (especially as LVEF is low as well, see below). Patient admitted to missing multiple PM doses of Eiquis in past, discussed importance of medication compliance. Appreciate Cardiology and Cardiac EP input. S/p SERA/DCCV to NSR yesterday 9/5. Patient states that he is feeling better, denies chest pain, dyspnea, palpitations, dizziness or lightheadedness. EKG 9/6 12AM post 2nd dose of Tikosyn, SR 77bpm, OK 152ms, QRS 100ms QTC 450ms. Tele today SR 80-90's bpm, had transient nocturnal junctional rhythm  - Continue Tikosyn 250mcg po q12hrs. 12-lead EKG in 2hrs post-dose for QTc monitoring, to noify EP if QTc>500ms or >50ms from baseline  - Continue metoprolol succinate, now at 100mg daily  - Continue uninterrupted Eliquis  - Avoid QT prolonging agents  - Keep K+>4, Mg>2.   - Will consider AF ablation as outpatient    Non ischemic cardiomyopathy  TTE with LV systolic dysfunction. Had coronary CT angio as outpatient in 6/2024, normal coronaries and calcium score 0. LV dysfunction possibly due to tachyarrhythmia also could relate to his alcohol use. Appears euvolemic. Appreciate Cardiology input  - Entresto 24/26 BID, spironolactone, metoprolol succinate (reduced to 100 daily)  - Monitor Is and Os  - Trend weight  - Avoid added salt in diet  - Plan to repeat echo as outpatient while in SR    PAO  On CPAP, tolerates that well  - Continue nightly CPAP    Diabetes  A1c 6.8, well controlled  - Continue insulin correctional scale      Dispo: Anticipate DC home once 6 doses of Tikosyn loading is complete

## 2024-09-07 DIAGNOSIS — I42.9 CARDIOMYOPATHY, UNSPECIFIED: ICD-10-CM

## 2024-09-07 LAB
ANION GAP SERPL CALC-SCNC: 6 MMOL/L — SIGNIFICANT CHANGE UP (ref 5–17)
BUN SERPL-MCNC: 16 MG/DL — SIGNIFICANT CHANGE UP (ref 7–23)
CALCIUM SERPL-MCNC: 8.9 MG/DL — SIGNIFICANT CHANGE UP (ref 8.5–10.1)
CHLORIDE SERPL-SCNC: 112 MMOL/L — HIGH (ref 96–108)
CO2 SERPL-SCNC: 23 MMOL/L — SIGNIFICANT CHANGE UP (ref 22–31)
CREAT SERPL-MCNC: 1.33 MG/DL — HIGH (ref 0.5–1.3)
EGFR: 71 ML/MIN/1.73M2 — SIGNIFICANT CHANGE UP
GLUCOSE SERPL-MCNC: 126 MG/DL — HIGH (ref 70–99)
MAGNESIUM SERPL-MCNC: 2.3 MG/DL — SIGNIFICANT CHANGE UP (ref 1.6–2.6)
PHOSPHATE SERPL-MCNC: 3.5 MG/DL — SIGNIFICANT CHANGE UP (ref 2.5–4.5)
POTASSIUM SERPL-MCNC: 4.1 MMOL/L — SIGNIFICANT CHANGE UP (ref 3.5–5.3)
POTASSIUM SERPL-SCNC: 4.1 MMOL/L — SIGNIFICANT CHANGE UP (ref 3.5–5.3)
SODIUM SERPL-SCNC: 141 MMOL/L — SIGNIFICANT CHANGE UP (ref 135–145)

## 2024-09-07 PROCEDURE — 99232 SBSQ HOSP IP/OBS MODERATE 35: CPT

## 2024-09-07 PROCEDURE — 93010 ELECTROCARDIOGRAM REPORT: CPT

## 2024-09-07 RX ADMIN — DOFETILIDE 250 MICROGRAM(S): 250 CAPSULE ORAL at 09:41

## 2024-09-07 RX ADMIN — Medication 1000 UNIT(S): at 09:42

## 2024-09-07 RX ADMIN — APIXABAN 5 MILLIGRAM(S): 5 TABLET, FILM COATED ORAL at 09:42

## 2024-09-07 RX ADMIN — MAGNESIUM OXIDE TAB 400 MG (240 MG ELEMENTAL MG) 400 MILLIGRAM(S): 400 (240 MG) TAB at 20:37

## 2024-09-07 RX ADMIN — TIOTROPIUM BROMIDE INHALATION SPRAY 2 PUFF(S): 3.12 SPRAY, METERED RESPIRATORY (INHALATION) at 08:51

## 2024-09-07 RX ADMIN — Medication 1000 MICROGRAM(S): at 09:41

## 2024-09-07 RX ADMIN — SACUBITRIL AND VALSARTAN 1 TABLET(S): 49; 51 TABLET, FILM COATED ORAL at 20:37

## 2024-09-07 RX ADMIN — ROSUVASTATIN CALCIUM 5 MILLIGRAM(S): 10 TABLET ORAL at 20:37

## 2024-09-07 RX ADMIN — METOPROLOL TARTRATE 100 MILLIGRAM(S): 100 TABLET ORAL at 09:41

## 2024-09-07 RX ADMIN — SPIRONOLACTONE 25 MILLIGRAM(S): 25 TABLET, FILM COATED ORAL at 09:42

## 2024-09-07 RX ADMIN — SACUBITRIL AND VALSARTAN 1 TABLET(S): 49; 51 TABLET, FILM COATED ORAL at 09:41

## 2024-09-07 RX ADMIN — DOFETILIDE 250 MICROGRAM(S): 250 CAPSULE ORAL at 20:36

## 2024-09-07 RX ADMIN — APIXABAN 5 MILLIGRAM(S): 5 TABLET, FILM COATED ORAL at 20:36

## 2024-09-07 RX ADMIN — Medication 1 TABLET(S): at 09:42

## 2024-09-07 RX ADMIN — Medication 500 MILLIGRAM(S): at 09:41

## 2024-09-07 NOTE — PROGRESS NOTE ADULT - ASSESSMENT
39 yo man with obesity, PAO, DM, HTN, afib since COVID 3/2024 s/p DCCV on Eliquis, awoke 8/30 with palpitations, noticed his HR was highly variable on his Apple Watch, presented for further evaluation. Found to have rapid afib/flutter. Admitted to Medicine.     Paroxysmal atrial fibrillation, flutter  Patient presented in afib/flutter with RVR, treated initially with beta blocker and diltiazem drip. However, patient was noted to have pauses on telemetry, up to 4.8 sec though nocturnally and in setting of his known PAO on CPAP. Stopped diltiazem (especially as LVEF is low as well, see below). Patient admitted to missing multiple PM doses of Eiquis in past, discussed importance of medication compliance. Appreciate Cardiology and Cardiac EP input. S/p SERA/DCCV to NSR yesterday 9/5. Patient states that he is feeling better, denies chest pain, dyspnea, palpitations, dizziness or lightheadedness. EKG 9/6 12AM post 2nd dose of Tikosyn, SR 77bpm, DC 152ms, QRS 100ms QTC 450ms. Tele today SR 80-90's bpm, had transient nocturnal junctional rhythm  - Continue Tikosyn 250mcg po q12hrs. 12-lead EKG in 2hrs post-dose for QTc monitoring, to noify EP if QTc>500ms or >50ms from baseline  - Continue metoprolol succinate, now at 100mg daily  - Continue uninterrupted Eliquis  - Avoid QT prolonging agents  - Keep K+>4, Mg>2.   - Will consider AF ablation as outpatient    Non ischemic cardiomyopathy  TTE with LV systolic dysfunction. Had coronary CT angio as outpatient in 6/2024, normal coronaries and calcium score 0. LV dysfunction possibly due to tachyarrhythmia also could relate to his alcohol use. Appears euvolemic. Appreciate Cardiology input  - Entresto 24/26 BID, spironolactone, metoprolol succinate (reduced to 100 daily)  - Monitor Is and Os  - Trend weight  - Avoid added salt in diet  - Plan to repeat echo as outpatient while in SR    PAO  On CPAP, tolerates that well  - Continue nightly CPAP    Diabetes  A1c 6.8, well controlled  - Continue insulin correctional scale      Dispo: Anticipate DC home once 6 doses of Tikosyn loading is complete

## 2024-09-07 NOTE — PROGRESS NOTE ADULT - PROBLEM SELECTOR PLAN 2
Non Ischemic CM with moderate-severe LV dysfunction -- my be tachycardic-related; continue Entresto, Metoprolol.

## 2024-09-08 VITALS
RESPIRATION RATE: 19 BRPM | TEMPERATURE: 98 F | HEART RATE: 70 BPM | SYSTOLIC BLOOD PRESSURE: 107 MMHG | DIASTOLIC BLOOD PRESSURE: 120 MMHG | OXYGEN SATURATION: 100 %

## 2024-09-08 LAB
ANION GAP SERPL CALC-SCNC: 7 MMOL/L — SIGNIFICANT CHANGE UP (ref 5–17)
BUN SERPL-MCNC: 18 MG/DL — SIGNIFICANT CHANGE UP (ref 7–23)
CALCIUM SERPL-MCNC: 9.4 MG/DL — SIGNIFICANT CHANGE UP (ref 8.5–10.1)
CHLORIDE SERPL-SCNC: 112 MMOL/L — HIGH (ref 96–108)
CO2 SERPL-SCNC: 21 MMOL/L — LOW (ref 22–31)
CREAT SERPL-MCNC: 1.38 MG/DL — HIGH (ref 0.5–1.3)
EGFR: 68 ML/MIN/1.73M2 — SIGNIFICANT CHANGE UP
GLUCOSE SERPL-MCNC: 139 MG/DL — HIGH (ref 70–99)
MAGNESIUM SERPL-MCNC: 2.2 MG/DL — SIGNIFICANT CHANGE UP (ref 1.6–2.6)
PHOSPHATE SERPL-MCNC: 3.9 MG/DL — SIGNIFICANT CHANGE UP (ref 2.5–4.5)
POTASSIUM SERPL-MCNC: 4.3 MMOL/L — SIGNIFICANT CHANGE UP (ref 3.5–5.3)
POTASSIUM SERPL-SCNC: 4.3 MMOL/L — SIGNIFICANT CHANGE UP (ref 3.5–5.3)
SODIUM SERPL-SCNC: 140 MMOL/L — SIGNIFICANT CHANGE UP (ref 135–145)

## 2024-09-08 PROCEDURE — 99232 SBSQ HOSP IP/OBS MODERATE 35: CPT

## 2024-09-08 PROCEDURE — 99239 HOSP IP/OBS DSCHRG MGMT >30: CPT

## 2024-09-08 RX ORDER — SACUBITRIL AND VALSARTAN 49; 51 MG/1; MG/1
1 TABLET, FILM COATED ORAL
Qty: 60 | Refills: 0
Start: 2024-09-08

## 2024-09-08 RX ORDER — METOPROLOL TARTRATE 100 MG/1
1 TABLET ORAL
Qty: 30 | Refills: 0
Start: 2024-09-08

## 2024-09-08 RX ORDER — DOFETILIDE 250 UG/1
1 CAPSULE ORAL
Qty: 60 | Refills: 0
Start: 2024-09-08

## 2024-09-08 RX ADMIN — METOPROLOL TARTRATE 100 MILLIGRAM(S): 100 TABLET ORAL at 09:14

## 2024-09-08 RX ADMIN — Medication 1 TABLET(S): at 09:14

## 2024-09-08 RX ADMIN — Medication 1000 UNIT(S): at 09:14

## 2024-09-08 RX ADMIN — Medication 500 MILLIGRAM(S): at 09:14

## 2024-09-08 RX ADMIN — SACUBITRIL AND VALSARTAN 1 TABLET(S): 49; 51 TABLET, FILM COATED ORAL at 09:14

## 2024-09-08 RX ADMIN — APIXABAN 5 MILLIGRAM(S): 5 TABLET, FILM COATED ORAL at 09:14

## 2024-09-08 RX ADMIN — Medication 1000 MICROGRAM(S): at 09:14

## 2024-09-08 RX ADMIN — TIOTROPIUM BROMIDE INHALATION SPRAY 2 PUFF(S): 3.12 SPRAY, METERED RESPIRATORY (INHALATION) at 08:46

## 2024-09-08 RX ADMIN — SPIRONOLACTONE 25 MILLIGRAM(S): 25 TABLET, FILM COATED ORAL at 09:14

## 2024-09-08 RX ADMIN — DOFETILIDE 250 MICROGRAM(S): 250 CAPSULE ORAL at 09:22

## 2024-09-08 NOTE — DISCHARGE NOTE PROVIDER - NSDCQMSTAIRS_GEN_ALL_CORE
Liban [facility]   [address] [facility], [address]   OP NOTE       Name:  Ani العلي   MR#:  [id number]   :  1999   Account #:  [de-identified]    Surgery Date:  2017   Date of Adm:  2017       PREOPERATIVE DIAGNOSIS: Chronic recurrent ingrown toenail, left   great toe. POSTOPERATIVE DIAGNOSIS: Chronic recurrent ingrown toenail, left   great toe. PROCEDURE PERFORMED: Left great toe Syme amputation. SURGEON: Katarina Meza MD     ANESTHESIA: Ankle block. COMPLICATIONS: None encountered. SPECIMENS REMOVED: Toe was sent to Pathology. TOURNIQUET TIME: 21 minutes. ESTIMATED BLOOD LOSS: Nil. DESCRIPTION OF PROCEDURE: After consents were obtained and   preoperative sedation, the patient was taken to the operating suites   and underwent an ankle block without difficulty. The pneumatic   tourniquet was placed around the left ankle and the left foot was   scrubbed and draped in the usual sterile fashion. After Esmarch   exsanguination, the tourniquet was inflated to 300 mmHg. An elliptical excision of skin and nail was mapped out around the distal   tip of the toe. The incision was carried through skin and subcutaneous   tissue down to bone and subperiosteal elevation was performed,   elevating the nail soft tissue, skin and matrix from the distal phalanx. After completion of the soft tissue removable, the matrix proximally   was examined to be sure that all matrix was removed. The plantar pad   of skin on the distal phalanx was subperiosteal elevated from the   plantar surface of the distal phalanx. The distal phalanx was then   amputated just proximal to the tuft using a bone cutting rongeur. All   bony prominences were smoothed. The wound was copiously irrigated.  The flap was closed using 3-0   Monocryl suture in interrupted fashion for the superficial fascia, and a   4-0 nylon suture in an interrupted fashion for the skin. A sterile   compressive postoperative dressing was applied, the tourniquet was   deflated, and the patient was awakened from anesthesia and taken to   the recovery room in satisfactory condition.         MD Sara Bailey / Farrah Ramos   D:  02/01/2017   11:57   T:  02/01/2017   15:04   Job #:  [id number] No

## 2024-09-08 NOTE — DISCHARGE NOTE PROVIDER - CARE PROVIDER_API CALL
Alexandria Kim  Cardiology  [address] [place] [address]  Phone: [phone]  Fax: [phone]  Scheduled Appointment: 09/13/2024 01:30 PM    Dada Abraham  Cardiac Electrophysiology  [address] [place] [address]  Phone: [phone]  Fax: [phone]  Scheduled Appointment: 09/13/2024 10:45 AM

## 2024-09-08 NOTE — DISCHARGE NOTE PROVIDER - CARE PROVIDERS DIRECT ADDRESSES
[email or web address]

## 2024-09-08 NOTE — DISCHARGE NOTE PROVIDER - PROVIDER TOKENS
PROVIDER:[TOKEN:[[id number]],SCHEDULEDAPPT:[09/13/2024],SCHEDULEDAPPTTIME:[01:30 PM]],PROVIDER:[TOKEN:[[id number]],SCHEDULEDAPPT:[09/13/2024],SCHEDULEDAPPTTIME:[10:45 AM]]

## 2024-09-08 NOTE — DISCHARGE NOTE PROVIDER - NSDCMRMEDTOKEN_GEN_ALL_CORE_FT
albuterol 90 mcg/inh inhalation aerosol: 2 puff(s) inhaled every 6 hours as needed for  shortness of breath and/or wheezing  apixaban 5 mg oral tablet: 1 tab(s) orally every 12 hours  ascorbic acid 500 mg oral tablet: 1 tab(s) orally once a day  cholecalciferol 25 mcg (1000 intl units) oral tablet: 1 tab(s) orally once a day  Co-Q10 100 mg oral capsule: 1 cap(s) orally once a day  cyanocobalamin 1000 mcg oral tablet: 1 tab(s) orally once a day  dofetilide 250 mcg oral capsule: 1 cap(s) orally every 12 hours  metFORMIN 500 mg oral tablet: 2 tab(s) orally 2 times a day  Metoprolol Succinate  mg oral tablet, extended release: 1 tab(s) orally once a day  rosuvastatin 5 mg oral tablet: 1 tab(s) orally once a day  sacubitril-valsartan 24 mg-26 mg oral tablet: 1 tab(s) orally 2 times a day  spironolactone 25 mg oral tablet: 1 tab(s) orally once a day  tiotropium 18 mcg inhalation capsule: 1 cap(s) inhaled once a day

## 2024-09-08 NOTE — PROGRESS NOTE ADULT - PROBLEM SELECTOR PLAN 2
Non Ischemic CM (no CAD on outpatient CT) with moderate-severe LV dysfunction -- my be tachycardic-related; continue Entresto, Metoprolol with plans to re-evaluate LV in several months,    ** Outpatient f/u with Dr. Kim / Leigh

## 2024-09-08 NOTE — PROGRESS NOTE ADULT - PROVIDER SPECIALTY LIST ADULT
Electrophysiology
Hospitalist
Hospitalist
Cardiology
Hospitalist
Cardiology
Electrophysiology
Hospitalist
Cardiology
Hospitalist
Cardiology
Hospitalist
Cardiology

## 2024-09-08 NOTE — PROGRESS NOTE ADULT - SUBJECTIVE AND OBJECTIVE BOX
Chief Complaint: Palpitations    Interval Hx: Patient seen and examined this AM. Underwent successful SERA/DCCV. Reports feeling improved. Without palpitations at this point. No chest complaints. No chest pain or tightness. No dizziness or lightheadedness. No dyspnea, orthopnea or PND. No abdominal complaints or urinary complaints. NSR on monitor. To start Tikosyn.     ROS: Multi system review is comprehensively negative x 10 systems except as above    Vitals:  T(F): 98 (05 Sep 2024 11:15), Max: 98.1 (04 Sep 2024 20:30)  HR: 80 (05 Sep 2024 11:15) (76 - 123)  BP: 135/99 (05 Sep 2024 12:42) (112/65 - 146/120)  RR: 26 (05 Sep 2024 11:15) (16 - 26)  SpO2: 100% (05 Sep 2024 11:15) (99% - 100%) on room air    Exam:  Gen: Comfortable appearing  HEENT: NCAT PERRL EOMI MMM clear oropharynx  Neck: Supple, no JVD, no LAD  CVS: s1 s2 normal, regular, rate ~80  Chest: Normal resp effort, lungs CTA B/L  Abd: +BS, soft NT ND   Ext: No tenderness, intact peripheral pulses, normal cap refill, no clubbing  Skin: Warm, dry  Mood: Calm, pleasant  Neuro: A+OX3, no deficits    Labs:                        15.9   4.96  )--------( 208                   46.9       141  |  111  |  20  ----------------------<  132  4.1   |  23  |  1.43    Ca  8.7      Phos  3.5     Mg  2.1         TPro  6.9  /  Alb  3.3  /  TBili  0.3  /  DBili  x   /  AST  34  /  ALT  67  /  AlkPhos  66      Troponin 56    LDL 78    A1c 6.8    TSH 2.38    Urinalysis negative    Imaging:  CXR 8/30: Heart magnified by technique. Lungs are clear.    Cardiac Testing/Procedures:  Tele 9/5: Personally reviewed post DCCV, SR, rate 70s    EKG 9/5: SR 77bpm NE 152ms, QRS 100ms, QTC 450ms    SERA/DCCV 9/5: Left ventricular systolic function is severely decreased. Moderate mitral regurgitation. Mild aortic regurgitation. No evidence of left atrial or left atrial appendage thrombus.    Tele 9/4: Afib/flutter, 80s-110s, occ PVCs    TTE 8/31: Left ventricular systolic function is moderately decreased with an ejection fraction visually estimated at 35 to 40 %. Unable to accurately assess segmental wall motion abnormalities due to irregular rhythm. No pericardial effusion seen.    EKG 8/31: Atrial fibrillation with premature ventricular or aberrantly conducted complexes. Minimal voltage criteria for LVH, may be normal variant ( Tyler product )    EKG 8/30: Atrial fibrillation with rapid ventricular response    Prior visit testing  Coronary CT 6/2024: The calcium score is 0.  No plaque or stenosis within the proximal segment of coronary arteries. The mid-distal segments as well as side branches are not evaluated secondary to motion and streak artifact.    SERA 4/2024:  Reduced left ventricular systolic function. Estimated LVEF is 40%. No evidence of right atrial, left atrial or left atrial appendage thrombus. Moderate mitral regurgitation. Mild tricuspid regurgitation. Mild pulmonic regurgitation.    Meds:  MEDICATIONS  (STANDING):  apixaban 5 milliGRAM(s) Oral every 12 hours  ascorbic acid 500 milliGRAM(s) Oral daily  cholecalciferol 1000 Unit(s) Oral daily  cyanocobalamin 1000 MICROGram(s) Oral daily  dofetilide. 250 MICROGram(s) Oral every 12 hours  insulin lispro (ADMELOG) corrective regimen sliding scale   SubCutaneous three times a day before meals  insulin lispro (ADMELOG) corrective regimen sliding scale   SubCutaneous at bedtime  lactobacillus acidophilus 1 Tablet(s) Oral daily  magnesium oxide 400 milliGRAM(s) Oral at bedtime  metoprolol succinate  milliGRAM(s) Oral daily  rosuvastatin 5 milliGRAM(s) Oral at bedtime  sacubitril 24 mG/valsartan 26 mG 1 Tablet(s) Oral two times a day  spironolactone 25 milliGRAM(s) Oral daily  tiotropium 2.5 MICROgram(s) Inhaler 2 Puff(s) Inhalation daily    MEDICATIONS  (PRN):  albuterol    90 MICROgram(s) HFA Inhaler 2 Puff(s) Inhalation every 6 hours PRN for shortness of breath and/or wheezing  dextrose Oral Gel 15 Gram(s) Oral once PRN Blood Glucose LESS THAN 70 milliGRAM(s)/deciliter  metoprolol tartrate Injectable 5 milliGRAM(s) IV Push every 6 hours PRN HR >120  senna 2 Tablet(s) Oral at bedtime PRN Constipation  
                                                                                                                                      ELECTROPHYSIOLOGY  PROGRESS NOTE      Reason for follow up: AF with RVR/ tikosyn loading  Overnight: No new events. Pt remains in NSR with lowest HR 50s-80s brief dip to 38 overnight. Avg HR 70-80s.   Update: Tikosyn loading day # 3 ( started am of 09/05) QTc 488 ms this am-continue to monitor and assess. Pt feels good asymptomatic.    Subjective: "  __________I'm good "    General: No fatigue, no fevers/chills  Respiratory: No dyspnea, no cough, no wheeze  CV: No chest pain, no palpitations  Abd: No nausea  Neuro: No headache, no dizziness  	  Vitals:  T(C): 36.5 (09-07-24 @ 08:19), Max: 36.5 (09-07-24 @ 08:19)  HR: 77 (09-07-24 @ 08:19) (77 - 77)  BP: 145/105 (09-07-24 @ 08:19) (145/105 - 145/105)  RR: 18 (09-07-24 @ 08:19) (18 - 18)  SpO2: 99% (09-07-24 @ 08:19) (99% - 99%)  Wt(kg): --  I&O's Summary      PHYSICAL EXAM:  Appearance: Comfortable. No acute distress Resting comfortably   HEENT:  Head and neck: Atraumatic. Normocephalic.  Normal oral mucosa, PERRL, Neck is supple. No JVD, No carotid bruit.   Neurologic: A & O x 3, no focal deficits. EOMI.  Lymphatic: No cervical lymphadenopathy  Cardiovascular: Normal S1 S2, No murmur, rubs/gallops. No JVD, No edema  Respiratory: Lungs clear to auscultation  Gastrointestinal:  Soft, Non-tender, + BS  Lower Extremities: No edema  Psychiatry: Patient is calm. No agitation. Mood & affect appropriate  Skin: No rashes/ ecchymoses/cyanosis/ulcers visualized on the face, hands or feet.      CURRENT MEDICATIONS:  dofetilide. 250 MICROGram(s) Oral every 12 hours  metoprolol succinate  milliGRAM(s) Oral daily  metoprolol tartrate Injectable 5 milliGRAM(s) IV Push every 6 hours PRN  sacubitril 24 mG/valsartan 26 mG 1 Tablet(s) Oral two times a day  spironolactone 25 milliGRAM(s) Oral daily    tiotropium 2.5 MICROgram(s) Inhaler  rosuvastatin  apixaban  ascorbic acid  cholecalciferol  cyanocobalamin  magnesium oxide    TRANSESOPHAGEAL ECHOCARDIOGRAM REPORT  ________________________________________________________________________________                                      _______       Pt. Name:       JORGE VALDOVINOS Study Date:    9/5/2024  MRN:            [id number]      YOB: 1985  Accession #:    [id number]     Age:           38 years  Account#:       [id number]  Gender:        M  Heart Rate:                   Height:        70.00 in (177.80 cm)  Rhythm:                       Weight:        380.00 lb (172.37 kg)  Blood Pressure: 121/93 mmHg   BSA/BMI:       2.74 m² / 54.52 kg/m²  ________________________________________________________________________________________  Referring Physician:    [id number] David Hansen  Interpreting Physician: Bhargav Islas MD  Primary Sonographer:    Aundrea Martínez [facility]    CPT:               ECHO TRANSESOPH W/O CON - 71220.m;DOPPLER ECHO COMP W SPECT -                     74794.m;DOPPL ECHO COLOR FLOW - 73687.m  Indication(s):     Unspecified atrial fibrillation - I48.91  Procedure:         Transesophageal echocardiogram performed with 2D, M-mode and                     complete spectral and color flow Doppler.  Ordering Location:   Admission Status:  Inpatient    _______________________________________________________________________________________     CONCLUSIONS:      1. Left ventricular systolic function is severely decreased.   2. Moderate mitral regurgitation.   3. Mild aortic regurgitation.   4. No evidence of left atrial or left atrial appendage thrombus.    	      LABS:	 	        09-07    141  |  112<H>  |  16  ----------------------------<  126<H>  4.1   |  23  |  1.33<H>    Ca    8.9      07 Sep 2024 06:42  Phos  3.5     09-07  Mg     2.3     09-07          TELEMETRY: Reviewed  as above NSR/ SB 50-70s Avg HR 70-80s  ECG:  Reviewed by me. 	  Ventricular Rate 76 BPM    Atrial Rate 76 BPM    P-R Interval 148 ms    QRS Duration 100 ms    Q-T Interval 420 ms    QTC Calculation(Bazett) 472 ms    P Axis 51 degrees    R Axis 3 degrees    T Axis 7 degrees    Diagnosis Line Normal sinus rhythm  Possible Left atrial enlargement  Nonspecific T wave abnormality  Prolonged QT  Abnormal ECG  When compared with ECG of 06-SEP-2024 12:05,  No significant change was found  Confirmed by MD SANDRA, CHANELL ([id number]) on 9/6/2024 8:56:26 PM      
                                                                                                                         ELECTROPHYSIOLOGY   PROGRESS NOTE  Reason for follow up: AF with RVR Tikosyn loading  Overnight: No new events. Remains in SR/ SB lowest 58-60s QTc after 6th dose 455ms   Update: Pt tolerating tikosyn loading doses remains in NSR ok to discharge this am    Subjective: "  __________I'm good____________"   General: No fatigue, no fevers/chills  Respiratory: No dyspnea, no cough, no wheeze  CV: No chest pain, no palpitations  Abd: No nausea  Neuro: No headache, no dizziness    	  Vitals:  T(C): 36.7 (09-08-24 @ 08:00), Max: 36.7 (09-08-24 @ 08:00)  HR: 76 (09-08-24 @ 08:36) (70 - 76)  BP: 107/120 (09-08-24 @ 08:00) (107/120 - 144/82)  RR: 19 (09-08-24 @ 08:00) (19 - 19)  SpO2: 100% (09-08-24 @ 08:00) (98% - 100%)  Wt(kg): --  I&O's Summary    07 Sep 2024 07:01  -  08 Sep 2024 07:00  --------------------------------------------------------  IN: 0 mL / OUT: 450 mL / NET: -450 mL            PHYSICAL EXAM:  Appearance: Comfortable. No acute distress  HEENT:  Head and neck: Atraumatic. Normocephalic.  Normal oral mucosa, PERRL, Neck is supple. No JVD, No carotid bruit.   Neurologic: A & O x 3, no focal deficits. EOMI.  Lymphatic: No cervical lymphadenopathy  Cardiovascular: Normal S1 S2, No murmur, rubs/gallops. No JVD, No edema  Respiratory: Lungs clear to auscultation  Gastrointestinal:  Soft, Non-tender, + BS  Lower Extremities: No edema  Psychiatry: Patient is calm. No agitation. Mood & affect appropriate  Skin: No rashes/ ecchymoses/cyanosis/ulcers visualized on the face, hands or feet.      CURRENT MEDICATIONS:  dofetilide. 250 MICROGram(s) Oral every 12 hours  metoprolol succinate  milliGRAM(s) Oral daily  metoprolol tartrate Injectable 5 milliGRAM(s) IV Push every 6 hours PRN  sacubitril 24 mG/valsartan 26 mG 1 Tablet(s) Oral two times a day  spironolactone 25 milliGRAM(s) Oral daily    tiotropium 2.5 MICROgram(s) Inhaler  rosuvastatin  apixaban  ascorbic acid  cholecalciferol  cyanocobalamin  magnesium oxide      DIAGNOSTIC TESTING:  [ ] Echocardiogram:   [ ]  Catheterization:  [ ] Stress Test:    OTHER: 	      LABS:	 	        09-08    140  |  112<H>  |  18  ----------------------------<  139<H>  4.3   |  21<L>  |  1.38<H>    Ca    9.4      08 Sep 2024 06:46  Phos  3.9     09-08  Mg     2.2     09-08      proBNP:   Lipid Profile:   HgA1c:   TSH:       TELEMETRY: Reviewed  as above NSR 60s no AF detected   ECG:  Reviewed by me. 	LAST qTC 455 ms    Ventricular Rate 71 BPM    Atrial Rate 71 BPM    P-R Interval 154 ms    QRS Duration 102 ms    Q-T Interval 422 ms    QTC Calculation(Bazett) 458 ms    P Axis 48 degrees    R Axis -11 degrees    T Axis 13 degrees    Diagnosis Line Normal sinus rhythm  Nonspecific T wave abnormality  Abnormal ECG  When compared with ECG of 07-SEP-2024 01:34,  No significant change was found  Confirmed by MD SANDRA, CHANELL ([id number]) on 9/7/2024 5:09:46 PM    
  SUBJECTIVE:   HPI:  Pt is a pleasant 39 yo m with Past med hx of HTN , DM, Obesity, PAO and afib since covid in March 2024.   Pt states he was admitted and cardioverted to sinus rhythm in March 2024.  He has  been on Metoprolol and eliquis daily and had not had another episode. Today morning he woke with palpitations and his smart watch told him his rate was all over the place.  Pt called his cardiologist but then decided to come to [place] ED .   He denied cp or sob.    Pt admitted that he drank four ETOH drinks last night including 2 tequila /soda drinks and 2 shots but that usually he does not drink that much.   Pt reports he smoke two - three cigs daily.    Pt denies current cpain/SOB,  no dizziness, no fever/chills,  no abd pain, no n/v/d,   no urinary or resp  complaints.                ICU Vital Signs Last 24 Hrs  T(C): 36.3 (31 Aug 2024 07:58), Max: 37.2 (30 Aug 2024 15:50)  T(F): 97.3 (31 Aug 2024 07:58), Max: 98.9 (30 Aug 2024 15:50)  HR: 94 (31 Aug 2024 07:58) (63 - 156)  BP: 123/99 (31 Aug 2024 07:58) (93/73 - 158/88)  BP(mean): 94 (30 Aug 2024 20:25) (79 - 100)  ABP: --  ABP(mean): --  RR: 19 (31 Aug 2024 07:58) (16 - 20)  SpO2: 99% (31 Aug 2024 07:58) (97% - 99%)    O2 Parameters below as of 31 Aug 2024 07:58  Patient On (Oxygen Delivery Method): room air              I&O's Summary    31 Aug 2024 07:01  -  31 Aug 2024 12:06  --------------------------------------------------------  IN: 0 mL / OUT: 700 mL / NET: -700 mL            PHYSICAL EXAM:    Constitutional: NAD, awake and alert,   HEENT: PERR, EOMI, Normal Hearing, MMM  Neck: Soft and supple, No LAD, No JVD  Respiratory: Breath sounds are clear bilaterally, No wheezing, rales or rhonchi  Cardiovascular: S1 and S2, regular rate and rhythm, no Murmurs, gallops or rubs  Gastrointestinal: Bowel Sounds present, soft, nontender, nondistended, no guarding, no rebound  Extremities: No peripheral edema  Vascular: 2+ peripheral pulses  Neurological: A/O x 3, no focal deficits  Musculoskeletal: 5/5 strength b/l upper and lower extremities  Skin: No rashes    MEDICATIONS:  MEDICATIONS  (STANDING):  apixaban 5 milliGRAM(s) Oral every 12 hours  ascorbic acid 500 milliGRAM(s) Oral daily  cholecalciferol 1000 Unit(s) Oral daily  cyanocobalamin 1000 MICROGram(s) Oral daily  diltiazem Infusion 10 mG/Hr (10 mL/Hr) IV Continuous <Continuous>  metoprolol succinate ER 50 milliGRAM(s) Oral at bedtime  rosuvastatin 5 milliGRAM(s) Oral at bedtime  tiotropium 2.5 MICROgram(s) Inhaler 2 Puff(s) Inhalation daily      LABS: All Labs Reviewed:                        15.7   6.48  )-----------( 213      ( 30 Aug 2024 15:02 )             46.6     08-31    138  |  110<H>  |  18  ----------------------------<  141<H>  3.9   |  22  |  1.34<H>    Ca    9.0      31 Aug 2024 06:18  Phos  3.8     08-30  Mg     2.2     08-30    TPro  7.4  /  Alb  3.5  /  TBili  0.5  /  DBili  x   /  AST  24  /  ALT  36  /  AlkPhos  64  08-30    PT/INR - ( 30 Aug 2024 15:02 )   PT: 13.4 sec;   INR: 1.19 ratio         PTT - ( 30 Aug 2024 15:02 )  PTT:45.6 sec          Blood Culture:     RADIOLOGY/EKG: reviewed        
HPI:  39 yo M with  HTN , DM, Obesity, PAO on CPAP, and afib since covid in March 2024.   Pt states he was admitted and cardioverted to sinus rhythm in April 2024.  He has  been on Metoprolol and eliquis daily and had not had another episode. Today morning he woke with palpitations and his smart watch told him his rate was all over the place.  Pt called his cardiologist but then decided to come to [place] ED .   He denied cp or sob.    Pt admitted that he drank four ETOH drinks last night including 2 tequila /soda drinks and 2 shots but that usually he does not drink that much.   Pt reports he smoke two - three cigs daily.    Pt denies current chest pain/SOB,  no dizziness, no fever/chills,  no abd pain, no n/v/d,   no urinary or resp  complaints.       (30 Aug 2024 23:33)    s/p SERA/DCCV to NSR today, pt states feeling better , denies chest pain/SOB/palpitations  EKG: SR 77bpm AL 152ms, QRS 100ms, QTC 450ms  CTA (6/26/24) : Normal coronaries  Echo (8/31/24) LVEF 35-40%    ROS: All other ROS is negative unless indicated above.    Physical Exam:  Vital Signs Last 24 Hrs  T(C): 36.7 (05 Sep 2024 11:15), Max: 36.7 (04 Sep 2024 20:30)  T(F): 98 (05 Sep 2024 11:15), Max: 98.1 (04 Sep 2024 20:30)  HR: 80 (05 Sep 2024 11:15) (76 - 123)  BP: 135/99 (05 Sep 2024 12:42) (112/65 - 146/120)  RR: 26 (05 Sep 2024 11:15) (16 - 26)  SpO2: 100% (05 Sep 2024 11:15) (99% - 100%)    Parameters below as of 05 Sep 2024 11:15  Patient On (Oxygen Delivery Method): room air    Constitutional: well developed,  no deformities and no acute distress    Neurological: Alert & Oriented x 3, SUAREZ, no focal deficits    HEENT: NC/AT, PERRLA, EOMI,  Neck supple.    Respiratory: CTA B/L, No wheezing/crackles/rhonchi    Cardiovascular: (+) S1 & S2, RRR,    Gastrointestinal: soft, NT, nondistended, (+) BS    Genitourinary: non distended bladder, voiding freely    Extremities: No pedal edema, No clubbing, No cyanosis    Skin:  normal skin color and pigmentation, no skin lesions      Allergies    penicillins (Unknown)      MEDICATIONS  (STANDING):  apixaban 5 milliGRAM(s) Oral every 12 hours  ascorbic acid 500 milliGRAM(s) Oral daily  cholecalciferol 1000 Unit(s) Oral daily  cyanocobalamin 1000 MICROGram(s) Oral daily  dextrose 5%. 1000 milliLiter(s) (50 mL/Hr) IV Continuous <Continuous>  dextrose 5%. 1000 milliLiter(s) (100 mL/Hr) IV Continuous <Continuous>  dextrose 50% Injectable 25 Gram(s) IV Push once  dextrose 50% Injectable 25 Gram(s) IV Push once  dextrose 50% Injectable 12.5 Gram(s) IV Push once  dofetilide. 250 MICROGram(s) Oral every 12 hours  glucagon  Injectable 1 milliGRAM(s) IntraMuscular once  insulin lispro (ADMELOG) corrective regimen sliding scale   SubCutaneous at bedtime  insulin lispro (ADMELOG) corrective regimen sliding scale   SubCutaneous three times a day before meals  lactobacillus acidophilus 1 Tablet(s) Oral daily  magnesium oxide 400 milliGRAM(s) Oral at bedtime  metoprolol succinate  milliGRAM(s) Oral daily  rosuvastatin 5 milliGRAM(s) Oral at bedtime  sacubitril 24 mG/valsartan 26 mG 1 Tablet(s) Oral two times a day  spironolactone 25 milliGRAM(s) Oral daily  tiotropium 2.5 MICROgram(s) Inhaler 2 Puff(s) Inhalation daily    MEDICATIONS  (PRN):  albuterol    90 MICROgram(s) HFA Inhaler 2 Puff(s) Inhalation every 6 hours PRN for shortness of breath and/or wheezing  dextrose Oral Gel 15 Gram(s) Oral once PRN Blood Glucose LESS THAN 70 milliGRAM(s)/deciliter  metoprolol tartrate Injectable 5 milliGRAM(s) IV Push every 6 hours PRN HR >120  senna 2 Tablet(s) Oral at bedtime PRN Constipation    LABS:                        15.9   4.96  )-----------( 208      ( 05 Sep 2024 07:01 )             46.9     09-05    141  |  111<H>  |  20  ----------------------------<  132<H>  4.1   |  23  |  1.43<H>    Ca    8.7      05 Sep 2024 07:01  Phos  3.5     09-05  Mg     2.1     09-05    TPro  6.9  /  Alb  3.3  /  TBili  0.3  /  DBili  x   /  AST  34  /  ALT  67  /  AlkPhos  66  09-03      Urinalysis Basic - ( 05 Sep 2024 07:01 )    Color: x / Appearance: x / SG: x / pH: x  Gluc: 132 mg/dL / Ketone: x  / Bili: x / Urobili: x   Blood: x / Protein: x / Nitrite: x   Leuk Esterase: x / RBC: x / WBC x   Sq Epi: x / Non Sq Epi: x / Bacteria: x                    
REASON FOR VISIT:  AF    HPI:  38 year old man with a history of severe obesity, HTN , HFrEF, DM, PAO and PAF admitted with recurrence of symptomatic atrial fibrillation.    Known h/o afib seen by our service previously.  SERA & CV performed successfully a few months ago.  Seen as OP.  CT coronary angio neg for coronary disease  Afib attributed to obesity BMI 62, pt has seen bariatric surgery.    9/1/24: weaned of diltiazem at 7 pm last night rates controlled until this AM went up to 170s, but now in 100s pt asymptomatic.  9/2/24: no complaints over night.  At 758a on tele, 7.6 sec pause noted. pt was sleeping. persistent afib 100s.  9/3/24: Awake alert no CP, tele AF rate controlled with a 4.8 second pause  9/4/24: no cardiac complaints, SERA/DCCV postoponed for 9/5, Tele: Afib   9/5/24:  No new complaints.     MEDICATIONS:  OUTPATIENT  Home Medications:  ascorbic acid 500 mg oral tablet: 1 tab(s) orally once a day (30 Aug 2024 19:35)  cholecalciferol 25 mcg (1000 intl units) oral tablet: 1 tab(s) orally once a day (30 Aug 2024 19:35)  Co-Q10 100 mg oral capsule: 1 cap(s) orally once a day (30 Aug 2024 19:35)  cyanocobalamin 1000 mcg oral tablet: 1 tab(s) orally once a day (30 Aug 2024 19:36)  metFORMIN 500 mg oral tablet: 2 tab(s) orally 2 times a day (30 Aug 2024 19:36)  rosuvastatin 5 mg oral tablet: 1 tab(s) orally once a day (30 Aug 2024 19:26)  spironolactone 25 mg oral tablet: 1 tab(s) orally once a day (30 Aug 2024 19:36)  tiotropium 18 mcg inhalation capsule: 1 cap(s) inhaled once a day (30 Aug 2024 19:36)    MEDICATIONS  (STANDING):  apixaban 5 milliGRAM(s) Oral every 12 hours  ascorbic acid 500 milliGRAM(s) Oral daily  cholecalciferol 1000 Unit(s) Oral daily  cyanocobalamin 1000 MICROGram(s) Oral daily  glucagon  Injectable 1 milliGRAM(s) IntraMuscular once  insulin lispro (ADMELOG) corrective regimen sliding scale   SubCutaneous at bedtime  insulin lispro (ADMELOG) corrective regimen sliding scale   SubCutaneous three times a day before meals  lactobacillus acidophilus 1 Tablet(s) Oral daily  metoprolol succinate  milliGRAM(s) Oral daily  rosuvastatin 5 milliGRAM(s) Oral at bedtime  sacubitril 24 mG/valsartan 26 mG 1 Tablet(s) Oral two times a day  spironolactone 25 milliGRAM(s) Oral daily  tiotropium 2.5 MICROgram(s) Inhaler 2 Puff(s) Inhalation daily    MEDICATIONS  (PRN):  albuterol    90 MICROgram(s) HFA Inhaler 2 Puff(s) Inhalation every 6 hours PRN for shortness of breath and/or wheezing  dextrose Oral Gel 15 Gram(s) Oral once PRN Blood Glucose LESS THAN 70 milliGRAM(s)/deciliter  metoprolol tartrate Injectable 5 milliGRAM(s) IV Push every 6 hours PRN HR >120  senna 2 Tablet(s) Oral at bedtime PRN Constipation    Vital Signs Last 24 Hrs  T(C): 36.6 (05 Sep 2024 09:43), Max: 36.7 (04 Sep 2024 20:30)  T(F): 97.9 (05 Sep 2024 09:43), Max: 98.1 (04 Sep 2024 20:30)  HR: 123 (05 Sep 2024 09:43) (90 - 123)  BP: 123/100 (05 Sep 2024 08:24) (123/100 - 129/79)  RR: 16 (05 Sep 2024 09:43) (16 - 18)  SpO2: 99% (05 Sep 2024 09:43) (99% - 100%)  Patient On (Oxygen Delivery Method): room air    PHYSICAL EXAM:  Constitutional: NAD, awake and alert, obese  HEENT: PERR, EOMI,  No oral cyanosis.  Respiratory: Breath sounds are clear bilaterally,  Cardiovascular: S1 and S2, tachycardic, irregular  Gastrointestinal: Abd soft, nontender.     LABS:                 15.9   4.96  )-----------( 208      ( 05 Sep 2024 07:01 )             46.9     141  |  111<H>  |  20  ----------------------------<  132<H>  4.1   |  23  |  1.43<H>    Ca    8.7      05 Sep 2024 07:01  Phos  3.5     09-05  Mg     2.1     09-05    TPro  6.9  /  Alb  3.3  /  TBili  0.3  /  DBili  x   /  AST  34  /  ALT  67  /  AlkPhos  66  09-03    TroponinI hsT: <-56.02    TTE Limited W or WO Ultrasound Enhancing Agent (08.31.24 @ 10:37) >   1. Left ventricular systolic function is moderately decreased with an ejection fraction visually estimated at 35 to 40 %.   2. Unable to accurately assess segmental wall motion abnormalities due to irregular rhythm.   3. No pericardial effusion seen.    Order Date: 6/26/2024 11:47 AM  Exam: CT ANGIO HEART CORONARY  1.  The calcium score is 0.  2.  No plaque or stenosis within the proximal segment of coronary arteries.  The mid-distal segments as well as side branches are not evaluated secondary to motion and streak artifact.  
Chief Complaint: Palpitations    Interval Hx: Patient seen and examined this AM. Reports feeling improved. Without palpitations at this point. No chest complaints. No chest pain or tightness. No dizziness or lightheadedness. No dyspnea, orthopnea or PND. No abdominal complaints or urinary complaints. Remains in afib/flutter up to 110s, PVCs. Planned for SERA/DCCV tomorrow.     ROS: Multi system review is comprehensively negative x 10 systems except as above    Vitals:  T(F): 97.3 (04 Sep 2024 07:39), Max: 98.4 (03 Sep 2024 20:10)  HR: 69 (04 Sep 2024 10:14) (57 - 100)  BP: 127/106 (04 Sep 2024 07:39) (127/106 - 145/108)  RR: 18 (04 Sep 2024 07:39) (18 - 18)  SpO2: 100% (04 Sep 2024 07:39) (98% - 100%) on room air    Exam:  Gen: Comfortable appearing  HEENT: NCAT PERRL EOMI MMM clear oropharynx  Neck: Supple, no JVD, no LAD  CVS: s1 s2 normal, irregular, rate ~80  Chest: Normal resp effort, lungs CTA B/L  Abd: +BS, soft NT ND   Ext: No tenderness, intact peripheral pulses, normal cap refill, no clubbing  Skin: Warm, dry  Mood: Calm, pleasant  Neuro: A+OX3, no deficits    Labs:              15.7  6.48 >-------< 213             46.6      139  |  112  |  20  ----------------------<  134  4.1   |  23  |  1.38    Ca  9.2    Phos  3.4    Mg 2.2      TPro  6.9  /  Alb  3.3  /  TBili  0.3  /  DBili  x   /  AST  34  /  ALT  67  /  AlkPhos  66      Troponin 56    LDL 78    A1c 6.8    TSH 2.38    Urinalysis negative    Imaging:  CXR 8/30: Heart magnified by technique. Lungs are clear.    Cardiac Testing:  Tele 9/4: Personally reviewed. Afib/flutter, 80s-110s, occ PVCs    TTE 8/31: Left ventricular systolic function is moderately decreased with an ejection fraction visually estimated at 35 to 40 %. Unable to accurately assess segmental wall motion abnormalities due to irregular rhythm. No pericardial effusion seen.    EKG 8/31: Atrial fibrillation with premature ventricular or aberrantly conducted complexes. Minimal voltage criteria for LVH, may be normal variant ( [place] product )    EKG 8/30: Atrial fibrillation with rapid ventricular response    Prior visit testing  Coronary CT 6/2024: The calcium score is 0.  No plaque or stenosis within the proximal segment of coronary arteries. The mid-distal segments as well as side branches are not evaluated secondary to motion and streak artifact.    SERA 4/2024:  Reduced left ventricular systolic function. Estimated LVEF is 40%. No evidence of right atrial, left atrial or left atrial appendage thrombus. Moderate mitral regurgitation. Mild tricuspid regurgitation. Mild pulmonic regurgitation.    Meds:  MEDICATIONS  (STANDING):  apixaban 5 milliGRAM(s) Oral every 12 hours  ascorbic acid 500 milliGRAM(s) Oral daily  cholecalciferol 1000 Unit(s) Oral daily  cyanocobalamin 1000 MICROGram(s) Oral daily  insulin lispro (ADMELOG) corrective regimen sliding scale   SubCutaneous three times a day before meals  insulin lispro (ADMELOG) corrective regimen sliding scale   SubCutaneous at bedtime  lactobacillus acidophilus 1 Tablet(s) Oral daily  metoprolol succinate  milliGRAM(s) Oral daily  rosuvastatin 5 milliGRAM(s) Oral at bedtime  sacubitril 24 mG/valsartan 26 mG 1 Tablet(s) Oral two times a day  spironolactone 25 milliGRAM(s) Oral daily  tiotropium 2.5 MICROgram(s) Inhaler 2 Puff(s) Inhalation daily    MEDICATIONS  (PRN):  albuterol    90 MICROgram(s) HFA Inhaler 2 Puff(s) Inhalation every 6 hours PRN for shortness of breath and/or wheezing  dextrose Oral Gel 15 Gram(s) Oral once PRN Blood Glucose LESS THAN 70 milliGRAM(s)/deciliter  metoprolol tartrate Injectable 5 milliGRAM(s) IV Push every 6 hours PRN HR >120  senna 2 Tablet(s) Oral at bedtime PRN Constipation     
Chief Complaint: Palpitations    Interval Hx: Patient seen and examined this AM. S/P DCCV with conversion to NSR. On Tikosyn loading, day # 3 (started AM 9/5/24). QTc 488ms this am. Patient feels well, asymptomatic. No palpitations at this point. No chest pain or tightness. No dizziness or lightheadedness. No dyspnea, orthopnea or PND. No abdominal complaints or urinary complaints.     ROS: Multi system review is comprehensively negative x 10 systems except as above    Vitals:  T(F): 97.7 (07 Sep 2024 08:19), Max: 97.7 (07 Sep 2024 08:19)  HR: 80 (07 Sep 2024 08:51) (77 - 81)  BP: 145/105 (07 Sep 2024 08:19) (145/105 - 145/105)  RR: 18 (07 Sep 2024 08:19) (18 - 18)  SpO2: 99% (07 Sep 2024 08:19) (99% - 99%) on room air    Exam:  Gen: Comfortable appearing  HEENT: NCAT PERRL EOMI MMM clear oropharynx  Neck: Supple, no JVD, no LAD  CVS: s1 s2 normal, regular, rate ~80  Chest: Normal resp effort, lungs CTA B/L  Abd: +BS, soft NT ND   Ext: No tenderness, intact peripheral pulses, normal cap refill, no clubbing  Skin: Warm, dry  Mood: Calm, pleasant  Neuro: A+OX3, no deficits    Labs:             POCT glucose 100s                 15.9   4.96  )--------( 208                   46.9       142  |  112  |  20  ----------------------<  135  4.1   |   23   |  1.35    Ca  8.9      Phos  3.4     Mg  2.5         TPro  6.9  /  Alb  3.3  /  TBili  0.3  /  DBili  x   /  AST  34  /  ALT  67  /  AlkPhos  66      Troponin 56    LDL 78    A1c 6.8    TSH 2.38    Urinalysis negative    Imaging:  CXR 8/30: Heart magnified by technique. Lungs are clear.    Cardiac Testing/Procedures:  Tele 9/7: SR, rate WNL    EKG 9/6 PM: Normal sinus rhythm. Rate WNL. Possible Left atrial enlargement. Nonspecific T wave abnormality. QTc 472 ms.     Tele 9/6: SR 80-90's bpm, had transient nocturnal junctional rhythm    EKG 9/6: 12AM post 2nd dose of Tikosyn, SR 77bpm, SC 152ms, QRS 100ms QTC 450ms    Tele 9/5: Post DCCV, SR, rate 70s    EKG 9/5: SR 77bpm SC 152ms, QRS 100ms, QTC 450ms    SERA/DCCV 9/5: Left ventricular systolic function is severely decreased. Moderate mitral regurgitation. Mild aortic regurgitation. No evidence of left atrial or left atrial appendage thrombus.    Tele 9/4: Afib/flutter, 80s-110s, occ PVCs    TTE 8/31: Left ventricular systolic function is moderately decreased with an ejection fraction visually estimated at 35 to 40 %. Unable to accurately assess segmental wall motion abnormalities due to irregular rhythm. No pericardial effusion seen.    EKG 8/31: Atrial fibrillation with premature ventricular or aberrantly conducted complexes. Minimal voltage criteria for LVH, may be normal variant ( [place] product )    EKG 8/30: Atrial fibrillation with rapid ventricular response    Prior visit testing  Coronary CT 6/2024: The calcium score is 0.  No plaque or stenosis within the proximal segment of coronary arteries. The mid-distal segments as well as side branches are not evaluated secondary to motion and streak artifact.    SERA 4/2024:  Reduced left ventricular systolic function. Estimated LVEF is 40%. No evidence of right atrial, left atrial or left atrial appendage thrombus. Moderate mitral regurgitation. Mild tricuspid regurgitation. Mild pulmonic regurgitation.    Meds:  MEDICATIONS  (STANDING):  apixaban 5 milliGRAM(s) Oral every 12 hours  ascorbic acid 500 milliGRAM(s) Oral daily  cholecalciferol 1000 Unit(s) Oral daily  cyanocobalamin 1000 MICROGram(s) Oral daily  dofetilide. 250 MICROGram(s) Oral every 12 hours  lactobacillus acidophilus 1 Tablet(s) Oral daily  magnesium oxide 400 milliGRAM(s) Oral at bedtime  metoprolol succinate  milliGRAM(s) Oral daily  rosuvastatin 5 milliGRAM(s) Oral at bedtime  sacubitril 24 mG/valsartan 26 mG 1 Tablet(s) Oral two times a day  spironolactone 25 milliGRAM(s) Oral daily  tiotropium 2.5 MICROgram(s) Inhaler 2 Puff(s) Inhalation daily    MEDICATIONS  (PRN):  albuterol    90 MICROgram(s) HFA Inhaler 2 Puff(s) Inhalation every 6 hours PRN for shortness of breath and/or wheezing  metoprolol tartrate Injectable 5 milliGRAM(s) IV Push every 6 hours PRN HR >120  senna 2 Tablet(s) Oral at bedtime PRN Constipation  
Chief Complaint: Palpitations    Interval Hx: Patient seen and examined this AM. Underwent successful SERA/DCCV yesterday, doing well since then. No palpitations at this point. No chest pain or tightness. No dizziness or lightheadedness. No dyspnea, orthopnea or PND. No abdominal complaints or urinary complaints. Tolerating Tikosyn load.     ROS: Multi system review is comprehensively negative x 10 systems except as above    Vitals:  T(F): 97.9 (06 Sep 2024 08:46), Max: 98.4 (05 Sep 2024 20:10)  HR: 80 (06 Sep 2024 08:50) (77 - 80)  BP: 147/113 (06 Sep 2024 08:46) (129/71 - 147/113)  RR: 18 (06 Sep 2024 08:46) (18 - 18)  SpO2: 97% (06 Sep 2024 08:50) (96% - 97%) on room air    Exam:  Gen: Comfortable appearing  HEENT: NCAT PERRL EOMI MMM clear oropharynx  Neck: Supple, no JVD, no LAD  CVS: s1 s2 normal, regular, rate ~80  Chest: Normal resp effort, lungs CTA B/L  Abd: +BS, soft NT ND   Ext: No tenderness, intact peripheral pulses, normal cap refill, no clubbing  Skin: Warm, dry  Mood: Calm, pleasant  Neuro: A+OX3, no deficits    Labs:             POCT glucose 100s                 15.9   4.96  )--------( 208                   46.9       142  |  112  |  20  ----------------------<  135  4.1   |   23   |  1.35    Ca  8.9      Phos  3.4     Mg  2.5         TPro  6.9  /  Alb  3.3  /  TBili  0.3  /  DBili  x   /  AST  34  /  ALT  67  /  AlkPhos  66      Troponin 56    LDL 78    A1c 6.8    TSH 2.38    Urinalysis negative    Imaging:  CXR 8/30: Heart magnified by technique. Lungs are clear.    Cardiac Testing/Procedures:  Tele 9/6: Personally reviewed. SR 80-90's bpm, had transient nocturnal junctional rhythm    EKG 9/6: 12AM post 2nd dose of Tikosyn, SR 77bpm, [place] 152ms, QRS 100ms QTC 450ms    Tele 9/5: Post DCCV, SR, rate 70s    EKG 9/5: SR 77bpm NJ 152ms, QRS 100ms, QTC 450ms    SERA/DCCV 9/5: Left ventricular systolic function is severely decreased. Moderate mitral regurgitation. Mild aortic regurgitation. No evidence of left atrial or left atrial appendage thrombus.    Tele 9/4: Afib/flutter, 80s-110s, occ PVCs    TTE 8/31: Left ventricular systolic function is moderately decreased with an ejection fraction visually estimated at 35 to 40 %. Unable to accurately assess segmental wall motion abnormalities due to irregular rhythm. No pericardial effusion seen.    EKG 8/31: Atrial fibrillation with premature ventricular or aberrantly conducted complexes. Minimal voltage criteria for LVH, may be normal variant ( [place] product )    EKG 8/30: Atrial fibrillation with rapid ventricular response    Prior visit testing  Coronary CT 6/2024: The calcium score is 0.  No plaque or stenosis within the proximal segment of coronary arteries. The mid-distal segments as well as side branches are not evaluated secondary to motion and streak artifact.    SERA 4/2024:  Reduced left ventricular systolic function. Estimated LVEF is 40%. No evidence of right atrial, left atrial or left atrial appendage thrombus. Moderate mitral regurgitation. Mild tricuspid regurgitation. Mild pulmonic regurgitation.    Meds:  MEDICATIONS  (STANDING):  apixaban 5 milliGRAM(s) Oral every 12 hours  ascorbic acid 500 milliGRAM(s) Oral daily  cholecalciferol 1000 Unit(s) Oral daily  cyanocobalamin 1000 MICROGram(s) Oral daily  dofetilide. 250 MICROGram(s) Oral every 12 hours  insulin lispro (ADMELOG) corrective regimen sliding scale   SubCutaneous at bedtime  insulin lispro (ADMELOG) corrective regimen sliding scale   SubCutaneous three times a day before meals  lactobacillus acidophilus 1 Tablet(s) Oral daily  magnesium oxide 400 milliGRAM(s) Oral at bedtime  metoprolol succinate  milliGRAM(s) Oral daily  rosuvastatin 5 milliGRAM(s) Oral at bedtime  sacubitril 24 mG/valsartan 26 mG 1 Tablet(s) Oral two times a day  spironolactone 25 milliGRAM(s) Oral daily  tiotropium 2.5 MICROgram(s) Inhaler 2 Puff(s) Inhalation daily    MEDICATIONS  (PRN):  albuterol    90 MICROgram(s) HFA Inhaler 2 Puff(s) Inhalation every 6 hours PRN for shortness of breath and/or wheezing  dextrose Oral Gel 15 Gram(s) Oral once PRN Blood Glucose LESS THAN 70 milliGRAM(s)/deciliter  metoprolol tartrate Injectable 5 milliGRAM(s) IV Push every 6 hours PRN HR >120  senna 2 Tablet(s) Oral at bedtime PRN Constipation    
HOSPITALIST ATTENDING PROGRESS NOTE     Chart and meds reviewed. Patient seen and examined     Interval Hx/Events: Pt seen and evaluated. Overnight events noted. Noted to have sinus pause on tele. Currently, pt has no acute complaints.       All other systems and founds to be negative with exception of what has been described above.         PHYSICAL EXAM:  Vital Signs Last 24 Hrs  T(C): 36.8 (03 Sep 2024 09:04), Max: 36.8 (03 Sep 2024 09:04)  T(F): 98.3 (03 Sep 2024 09:04), Max: 98.3 (03 Sep 2024 09:04)  HR: 90 (03 Sep 2024 10:05) (77 - 120)  BP: 152/88 (03 Sep 2024 09:04) (124/105 - 152/88)  RR: 18 (03 Sep 2024 09:04) (18 - 18)  SpO2: 97% (03 Sep 2024 09:04) (94% - 97%)    Parameters below as of 03 Sep 2024 09:04  Patient On (Oxygen Delivery Method): room air      Daily     Daily Weight in k (03 Sep 2024 06:02)    GEN: NAD   HEENT: EOMI,  moist mucous membranes  NECK : Soft and supple, no JVD  LUNG: CTABL, No wheezing, rales or rhonchi  CVS: S1S2+, RRR, no M/G/R  GI: BS+, soft, NT/ND, no guarding, no rebound  EXTREMITIES: No peripheral edema  VASCULAR: 2+ peripheral pulses  NEURO: AAOx3, grossly non-focal   SKIN: No rashes    HOME MEDICATIONS:  Home Medications:  ascorbic acid 500 mg oral tablet: 1 tab(s) orally once a day (30 Aug 2024 19:35)  cholecalciferol 25 mcg (1000 intl units) oral tablet: 1 tab(s) orally once a day (30 Aug 2024 19:35)  Co-Q10 100 mg oral capsule: 1 cap(s) orally once a day (30 Aug 2024 19:35)  cyanocobalamin 1000 mcg oral tablet: 1 tab(s) orally once a day (30 Aug 2024 19:36)  metFORMIN 500 mg oral tablet: 2 tab(s) orally 2 times a day (30 Aug 2024 19:36)  rosuvastatin 5 mg oral tablet: 1 tab(s) orally once a day (30 Aug 2024 19:26)  spironolactone 25 mg oral tablet: 1 tab(s) orally once a day (30 Aug 2024 19:36)  tiotropium 18 mcg inhalation capsule: 1 cap(s) inhaled once a day (30 Aug 2024 19:36)      MEDICATIONS  MEDICATIONS  (STANDING):  apixaban 5 milliGRAM(s) Oral every 12 hours  ascorbic acid 500 milliGRAM(s) Oral daily  cholecalciferol 1000 Unit(s) Oral daily  cyanocobalamin 1000 MICROGram(s) Oral daily  lactobacillus acidophilus 1 Tablet(s) Oral daily  metoprolol succinate  milliGRAM(s) Oral daily  rosuvastatin 5 milliGRAM(s) Oral at bedtime  tiotropium 2.5 MICROgram(s) Inhaler 2 Puff(s) Inhalation daily      LABS: All Labs Reviewed:        139  |  110<H>  |  22  ----------------------------<  133<H>  4.0   |  23  |  1.33<H>    Ca    9.1      03 Sep 2024 07:48  Mg     2.1               Urinalysis Basic - ( 03 Sep 2024 07:48 )    Color: x / Appearance: x / SG: x / pH: x  Gluc: 133 mg/dL / Ketone: x  / Bili: x / Urobili: x   Blood: x / Protein: x / Nitrite: x   Leuk Esterase: x / RBC: x / WBC x   Sq Epi: x / Non Sq Epi: x / Bacteria: x        Blood Culture:   I&O's Detail    CAPILLARY BLOOD GLUCOSE            CARDIOLOGY TESTING   EKG: reviewed     ECHO   < from: TTE Limited W or WO Ultrasound Enhancing Agent (24 @ 10:37) >  CONCLUSIONS:      1. Left ventricular systolic function is moderately decreased with an ejection fraction visually estimated at 35 to 40 %.   2. Unable to accurately assess segmental wall motion abnormalities due to irregular rhythm.   3. No pericardial effusion seen.    < end of copied text >        RADIOLOGY
HPI:  39 yo M with  HTN , DM, Obesity, PAO on CPAP, and afib since covid in March 2024.   Pt states he was admitted and cardioverted to sinus rhythm in April 2024.  He has  been on Metoprolol and eliquis daily and had not had another episode. Today morning he woke with palpitations and his smart watch told him his rate was all over the place.  Pt called his cardiologist but then decided to come to [place] [facility] .   He denied cp or sob.    Pt admitted that he drank four ETOH drinks last night including 2 tequila /soda drinks and 2 shots but that usually he does not drink that much.   Pt reports he smoke two - three cigs daily.    Pt denies current chest pain/SOB,  no dizziness, no fever/chills,  no abd pain, no n/v/d,   no urinary or resp  complaints.       (30 Aug 2024 23:33)    9/5/24: s/p SERA/DCCV to NSR today, pt states feeling better , denies chest pain/SOB/palpitations  EKG: SR 77bpm CT 152ms, QRS 100ms, QTC 450ms  CTA (6/26/24) : Normal coronaries  Echo (8/31/24) LVEF 35-40%    9/6/24: pt is resting in the bed, denies chest pain/SOB/palpitations  tele: SR 80-90's bpm, had transient nocturnal junctional rhythm  EKG at 12AM post 2nd dose of tikosyn : SR 77bpm, CT 152ms, QRS 100ms QTC 450ms    ROS: All other ROS is negative unless indicated above.      Physical Exam:  Vital Signs Last 24 Hrs  T(C): 36.6 (06 Sep 2024 08:46), Max: 36.9 (05 Sep 2024 20:10)  T(F): 97.9 (06 Sep 2024 08:46), Max: 98.4 (05 Sep 2024 20:10)  HR: 80 (06 Sep 2024 08:46) (77 - 80)  BP: 147/113 (06 Sep 2024 08:46) (129/71 - 147/113)  RR: 18 (06 Sep 2024 08:46) (18 - 26)  SpO2: 97% (06 Sep 2024 08:46) (96% - 100%)    Parameters below as of 06 Sep 2024 08:46  Patient On (Oxygen Delivery Method): room air      Constitutional: well developed, no deformities and no acute distress    Neurological: Alert & Oriented x 3, SUAREZ, no focal deficits    HEENT: NC/AT, PERRLA, EOMI,  Neck supple.    Respiratory: CTA B/L, No wheezing/crackles/rhonchi    Cardiovascular: (+) S1 & S2, RRR, No m/r/g    Gastrointestinal: soft, NT, nondistended, (+) BS    Genitourinary: non distended bladder, voiding freely    Extremities: No pedal edema, No clubbing, No cyanosis    Skin:  normal skin color and pigmentation, no skin lesions            Allergies    penicillins (Unknown)    Intolerances      MEDICATIONS  (STANDING):  apixaban 5 milliGRAM(s) Oral every 12 hours  ascorbic acid 500 milliGRAM(s) Oral daily  cholecalciferol 1000 Unit(s) Oral daily  cyanocobalamin 1000 MICROGram(s) Oral daily  dextrose 5%. 1000 milliLiter(s) (100 mL/Hr) IV Continuous <Continuous>  dextrose 5%. 1000 milliLiter(s) (50 mL/Hr) IV Continuous <Continuous>  dextrose 50% Injectable 25 Gram(s) IV Push once  dextrose 50% Injectable 12.5 Gram(s) IV Push once  dextrose 50% Injectable 25 Gram(s) IV Push once  dofetilide. 250 MICROGram(s) Oral every 12 hours  glucagon  Injectable 1 milliGRAM(s) IntraMuscular once  insulin lispro (ADMELOG) corrective regimen sliding scale   SubCutaneous three times a day before meals  insulin lispro (ADMELOG) corrective regimen sliding scale   SubCutaneous at bedtime  lactobacillus acidophilus 1 Tablet(s) Oral daily  magnesium oxide 400 milliGRAM(s) Oral at bedtime  metoprolol succinate  milliGRAM(s) Oral daily  rosuvastatin 5 milliGRAM(s) Oral at bedtime  sacubitril 24 mG/valsartan 26 mG 1 Tablet(s) Oral two times a day  spironolactone 25 milliGRAM(s) Oral daily  tiotropium 2.5 MICROgram(s) Inhaler 2 Puff(s) Inhalation daily    MEDICATIONS  (PRN):  albuterol    90 MICROgram(s) HFA Inhaler 2 Puff(s) Inhalation every 6 hours PRN for shortness of breath and/or wheezing  dextrose Oral Gel 15 Gram(s) Oral once PRN Blood Glucose LESS THAN 70 milliGRAM(s)/deciliter  metoprolol tartrate Injectable 5 milliGRAM(s) IV Push every 6 hours PRN HR >120  senna 2 Tablet(s) Oral at bedtime PRN Constipation    LABS:                        15.9   4.96  )-----------( 208      ( 05 Sep 2024 07:01 )             46.9     09-06    142  |  112<H>  |  20  ----------------------------<  135<H>  4.1   |  23  |  1.35<H>    Ca    8.9      06 Sep 2024 06:52  Phos  3.4     09-06  Mg     2.3     09-06        Urinalysis Basic - ( 06 Sep 2024 06:52 )    Color: x / Appearance: x / SG: x / pH: x  Gluc: 135 mg/dL / Ketone: x  / Bili: x / Urobili: x   Blood: x / Protein: x / Nitrite: x   Leuk Esterase: x / RBC: x / WBC x   Sq Epi: x / Non Sq Epi: x / Bacteria: x                  
HPI:  Pt is a pleasant 39 yo m with Past med hx of HTN , DM, Obesity, PAO and afib since covid in 2024.   Pt states he was admitted and cardioverted to sinus rhythm in 2024.  He has  been on Metoprolol and eliquis daily and had not had another episode. Today morning he woke with palpitations and his smart watch told him his rate was all over the place.  Pt called his cardiologist but then decided to come to [place] ED .   He denied cp or sob.    Pt admitted that he drank four ETOH drinks last night including 2 tequila /soda drinks and 2 shots but that usually he does not drink that much.   Pt reports he smoke two - three cigs daily.    Pt denies current cpain/SOB,  no dizziness, no fever/chills,  no abd pain, no n/v/d,   no urinary or resp  complaints.      admitted for afib w/ RVR.  Known h/o afib seen by our service previously.  SERA & CV performed successfully a few months ago.  Seen as OP.  CT coronary angio neg for coronary disease  Afib attributed to obesity BMI 62, pt has seen bariatric surgery.  Currently rate controlled in 90s. pt reports no symptoms now w/ rate control.  Tele afib 70-90.  Overnight 31 bpm bradycardia for 6 sec likely 2/2 PAO.  also h/o HFrEF EF 40s.    24: weaned of diltiazem at 7 pm last night  rates controlled until this AM went up to 170s  but now in 100s pt asymptomatic.      MEDICATIONS:  OUTPATIENT  Home Medications:  ascorbic acid 500 mg oral tablet: 1 tab(s) orally once a day (30 Aug 2024 19:35)  cholecalciferol 25 mcg (1000 intl units) oral tablet: 1 tab(s) orally once a day (30 Aug 2024 19:35)  Co-Q10 100 mg oral capsule: 1 cap(s) orally once a day (30 Aug 2024 19:35)  cyanocobalamin 1000 mcg oral tablet: 1 tab(s) orally once a day (30 Aug 2024 19:36)  metFORMIN 500 mg oral tablet: 2 tab(s) orally 2 times a day (30 Aug 2024 19:36)  rosuvastatin 5 mg oral tablet: 1 tab(s) orally once a day (30 Aug 2024 19:26)  spironolactone 25 mg oral tablet: 1 tab(s) orally once a day (30 Aug 2024 19:36)  tiotropium 18 mcg inhalation capsule: 1 cap(s) inhaled once a day (30 Aug 2024 19:36)      INPATIENT  MEDICATIONS  (STANDING):  apixaban 5 milliGRAM(s) Oral every 12 hours  ascorbic acid 500 milliGRAM(s) Oral daily  cholecalciferol 1000 Unit(s) Oral daily  cyanocobalamin 1000 MICROGram(s) Oral daily  diltiazem Infusion 5 mG/Hr (5 mL/Hr) IV Continuous <Continuous>  lactobacillus acidophilus 1 Tablet(s) Oral daily  rosuvastatin 5 milliGRAM(s) Oral at bedtime  tiotropium 2.5 MICROgram(s) Inhaler 2 Puff(s) Inhalation daily    MEDICATIONS  (PRN):  albuterol    90 MICROgram(s) HFA Inhaler 2 Puff(s) Inhalation every 6 hours PRN for shortness of breath and/or wheezing  metoprolol tartrate Injectable 5 milliGRAM(s) IV Push every 6 hours PRN HR >120  senna 2 Tablet(s) Oral at bedtime PRN Constipation          Vital Signs Last 24 Hrs  T(C): 36.7 (01 Sep 2024 09:15), Max: 36.8 (31 Aug 2024 20:53)  T(F): 98.1 (01 Sep 2024 09:15), Max: 98.2 (31 Aug 2024 20:53)  HR: 101 (01 Sep 2024 14:13) (65 - 116)  BP: 141/115 (01 Sep 2024 14:13) (126/103 - 143/122)  BP(mean): --  RR: 18 (01 Sep 2024 09:15) (18 - 18)  SpO2: 98% (01 Sep 2024 09:15) (98% - 99%)    Parameters below as of 01 Sep 2024 09:15  Patient On (Oxygen Delivery Method): room air    Daily     Daily Weight in k.7 (01 Sep 2024 06:54)I&O's Summary    31 Aug 2024 07:01  -  01 Sep 2024 07:00  --------------------------------------------------------  IN: 0 mL / OUT: 700 mL / NET: -700 mL        I&O's Detail    31 Aug 2024 07:01  -  01 Sep 2024 07:00  --------------------------------------------------------  IN:  Total IN: 0 mL    OUT:    Voided (mL): 700 mL  Total OUT: 700 mL    Total NET: -700 mL          I&O's Summary    31 Aug 2024 07:01  -  01 Sep 2024 07:00  --------------------------------------------------------  IN: 0 mL / OUT: 700 mL / NET: -700 mL        PHYSICAL EXAM:    Constitutional: NAD, awake and alert, obese  HEENT: PERR, EOMI,  No oral cyanosis.  Neck:  supple,  No JVD  Respiratory: Breath sounds are clear bilaterally, No wheezing, rales or rhonchi  Cardiovascular: S1 and S2, regular rate and rhythm, no Murmurs, gallops or rubs  Gastrointestinal: Bowel Sounds present, soft, nontender.   Extremities: No peripheral edema. No clubbing or cyanosis.  Vascular: 2+ peripheral pulses  Neurological: A/O x 3, no focal deficits        ===============================  ===============================  LABS:                         15.7   6.48  )-----------( 213      ( 30 Aug 2024 15:02 )             46.6     31 Aug 2024 06:18    138    |  110    |  18     ----------------------------<  141    3.9     |  22     |  1.34   30 Aug 2024 15:02    139    |  111    |  18     ----------------------------<  126    4.0     |  21     |  1.59     Ca    9.0        31 Aug 2024 06:18  Ca    9.3        30 Aug 2024 15:02  Phos  3.8       30 Aug 2024 15:02  Mg     2.2       30 Aug 2024 15:02    TPro  7.4    /  Alb  3.5    /  TBili  0.5    /  DBili  x      /  AST  24     /  ALT  36     /  AlkPhos  64     30 Aug 2024 15:02      ===============================  ===============================  CARDIAC BIOMARKERS:  BNP      TROPONIN  Troponin I, High Sensitivity Result: 56.02 ng/L (24 @ 15:02)  Troponin I, High Sensitivity Result: 11.32 ng/L (24 @ 06:09)  Troponin I, High Sensitivity Result: 13.28 ng/L (24 @ 21:17)  Troponin I, High Sensitivity Result: 16.76 ng/L (24 @ 14:28)  Troponin I, High Sensitivity Result: 55.13 ng/L (22 @ 20:44)      ===============================  ===============================     @ 06:18  TSH: 2.38    ===============================  ===============================  EKG:  Aug 31st - afib rate controlled in 80s no ischemic changes. QTc 457     - NSR QTc 472 no ischemic changes.    ===============================  ECHO:  < from: SERA Complete w/Spect and Color (24 @ 16:08) >   Impression     Summary     A transesophageal echocardiogram was performed and included probe   placement in the esophagus posterior to the heart by the interpreting   physician, real-time image acquisition and interpretation utilizing 2-D,   color flow Doppler, pulsed wave and/or continuous wave with spectral   display. The patient received IV sedation. The procedure was monitored   with automatic blood pressure monitoring, telemetry tracings, and pulse   oximetry. The patient tolerated the procedure well and there were no   complications.     Reduced left ventricular systolic function. Estimated LVEF is 40%.   No evidence of right atrial, left atrial or left atrial appendage   thrombus.   Moderate mitral regurgitation.   Mild tricuspid regurgitation.   Mild pulmonic regurgitation.     Signature     ----------------------------------------------------------------   Electronically signed by Alexandria Kim MD(Interpreting   physician) on 2024 04:39 PM    ===============================      TRANSTHORACIC ECHOCARDIOGRAM REPORT  _   Pt. Name:       JORGE VALDOVINOS       Study Date:    2024  MRN:            [id number]            YOB: 1985_______________________________________________________________________________________     CONCLUSIONS:      1. Left ventricular systolic function is moderately decreased with an ejection fraction visually estimated at 35 to 40 %.   2. Unable to accurately assess segmental wall motion abnormalities due to irregular rhythm.   3. No pericardial effusion seen.    ______________________________________    ===============================    < from: TTE Echo Complete w/o Contrast w/ Doppler (24 @ 18:53) >   Impression     Summary     The left ventricle is dilated with moderate, diffuse hypokinesis;   estimated left ventricular ejection fraction is 35-40 %.   Moderate concentric left ventricular hypertrophy.   The left atrium is severely dilated.   The IVC is dilated with decreased respiratory variation.   Moderate mitral regurgitation.   Mild tricuspid valve regurgitation.   Mild pulmonary hypertension.     Note: Tachycardia is noted during the exam.     Signature     ----------------------------------------------------------------   Electronically signed by Bhargav Islas MD(Interpreting   physician) on 2024 07:21 PM   ----------------------------------------------------------------    < from: TTE Echo Complete w/o Contrast w/ Doppler (24 @ 18:53) >     Left Atrium   The left atrium is severely dilated.    < end of copied text >  < from: TTE Echo Complete w/o Contrast w/ Doppler (24 @ 18:53) >     Tricuspid Valve   Normal appearing tricuspid valve structure.   Mild tricuspid valve regurgitation.   Mild pulmonary hypertension.    < end of copied text >  < from: TTE Echo Complete w/o Contrast w/ Doppler (24 @ 18:53) >   RVSP:43 mmHg     < end of copied text >      ===============================  CT ANGIO HEART CORONARY IC  - ORDERED BY: JORDAN MCNEIL      PROCEDURE DATE:  2024  Patient: JORGE VALDOVINOS  : 1985  MRN: [id number]  ACC: [id number]  Order Date: 2024 11:47 AM  Exam: CT ANGIO HEART CORONARY    FINDINGS:    CALCIUM SCORE: The calculated Agatston score is 0.    Agatston Score:  Left main: 0.  Left anterior descendin.  Left circumflex: 0.  Right coronary: 0.    CORONARY: Right coronary artery dominance. No evidence for anomalous coronary arteries.  No plaque or stenosis within the proximal segment of coronary arteries. The mid-distal segments as   well as side branches are not evaluated secondary to motion and streak artifact caused by patient's body habitus.    MORPHOLOGY: Normal LV size and shape.  Normal end diastolic left ventricular wall thickness. The left atrium is dilated. No thrombus.  VALVES: The aortic valve is trileaflet. The anterior and posterior leaflets of the mitral valve are normal..  PERICARDIUM: Normal pericardial contour. No pericardial effusion.  AORTA: Normal in diameter.  NONCARDIAC FINDINGS: Within normal limits..      IMPRESSION:    1.  The calcium score is 0.  2.  No plaque or stenosis within the proximal segment of coronary arteries.   The mid-distal segments as well as side branches are not evaluated secondary to motion and streak artifact.    --- End of Report ---    PAUL SELLERS MD; Attending Radiologist  This document has been electronically signed. 2024 11:24AM  ===============================    Jann Suresh M.D.  Cardiology, [facility]  Cell: [phone]  Offices:   [phone] ([facility] Office)  [phone] ([facility] Office)   
  SUBJECTIVE:   HPI:  Pt is a pleasant 37 yo m with Past med hx of HTN , DM, Obesity, PAO and afib since covid in March 2024.   Pt states he was admitted and cardioverted to sinus rhythm in March 2024.  He has  been on Metoprolol and eliquis daily and had not had another episode. Today morning he woke with palpitations and his smart watch told him his rate was all over the place.  Pt called his cardiologist but then decided to come to [place] ED .   He denied cp or sob.    Pt admitted that he drank four ETOH drinks last night including 2 tequila /soda drinks and 2 shots but that usually he does not drink that much.   Pt reports he smoke two - three cigs daily.    Pt denies current cpain/SOB,  no dizziness, no fever/chills,  no abd pain, no n/v/d,   no urinary or resp  complaints.          sub: sinus pause 7.5 seconds during sleep hours. HR  at rest      ICU Vital Signs Last 24 Hrs  T(C): 36.4 (02 Sep 2024 08:05), Max: 36.8 (01 Sep 2024 20:00)  T(F): 97.5 (02 Sep 2024 08:05), Max: 98.2 (01 Sep 2024 20:00)  HR: 95 (02 Sep 2024 08:05) (72 - 134)  BP: 110/84 (02 Sep 2024 08:05) (110/84 - 141/115)  BP(mean): 117 (01 Sep 2024 18:44) (117 - 117)  ABP: --  ABP(mean): --  RR: 17 (02 Sep 2024 08:05) (17 - 18)  SpO2: 99% (02 Sep 2024 08:05) (93% - 99%)    O2 Parameters below as of 02 Sep 2024 08:05  Patient On (Oxygen Delivery Method): room air            ICU Vital Signs Last 24 Hrs  T(C): 36.4 (02 Sep 2024 08:05), Max: 36.8 (01 Sep 2024 20:00)  T(F): 97.5 (02 Sep 2024 08:05), Max: 98.2 (01 Sep 2024 20:00)  HR: 95 (02 Sep 2024 08:05) (72 - 134)  BP: 110/84 (02 Sep 2024 08:05) (110/84 - 141/115)  BP(mean): 117 (01 Sep 2024 18:44) (117 - 117)  ABP: --  ABP(mean): --  RR: 17 (02 Sep 2024 08:05) (17 - 18)  SpO2: 99% (02 Sep 2024 08:05) (93% - 99%)    O2 Parameters below as of 02 Sep 2024 08:05  Patient On (Oxygen Delivery Method): room air              PHYSICAL EXAM:    Constitutional: NAD, awake and alert,   HEENT: PERR, EOMI, Normal Hearing, MMM  Neck: Soft and supple, No LAD, No JVD  Respiratory: Breath sounds are clear bilaterally, No wheezing, rales or rhonchi  Cardiovascular: S1 and S2, regular rate and rhythm, no Murmurs, gallops or rubs  Gastrointestinal: Bowel Sounds present, soft, nontender, nondistended, no guarding, no rebound  Extremities: No peripheral edema  Vascular: 2+ peripheral pulses  Neurological: A/O x 3, no focal deficits  Musculoskeletal: 5/5 strength b/l upper and lower extremities  Skin: No rashes    MEDICATIONS:  MEDICATIONS  (STANDING):  apixaban 5 milliGRAM(s) Oral every 12 hours  ascorbic acid 500 milliGRAM(s) Oral daily  cholecalciferol 1000 Unit(s) Oral daily  cyanocobalamin 1000 MICROGram(s) Oral daily  diltiazem Infusion 10 mG/Hr (10 mL/Hr) IV Continuous <Continuous>  metoprolol succinate ER 50 milliGRAM(s) Oral at bedtime  rosuvastatin 5 milliGRAM(s) Oral at bedtime  tiotropium 2.5 MICROgram(s) Inhaler 2 Puff(s) Inhalation daily      LABS: All Labs Reviewed:                        15.7   6.48  )-----------( 213      ( 30 Aug 2024 15:02 )             46.6     08-31    138  |  110<H>  |  18  ----------------------------<  141<H>  3.9   |  22  |  1.34<H>    Ca    9.0      31 Aug 2024 06:18  Phos  3.8     08-30  Mg     2.2     08-30    TPro  7.4  /  Alb  3.5  /  TBili  0.5  /  DBili  x   /  AST  24  /  ALT  36  /  AlkPhos  64  08-30    PT/INR - ( 30 Aug 2024 15:02 )   PT: 13.4 sec;   INR: 1.19 ratio         PTT - ( 30 Aug 2024 15:02 )  PTT:45.6 sec          Blood Culture:     RADIOLOGY/EKG: reviewed        
  SUBJECTIVE:   HPI:  Pt is a pleasant 39 yo m with Past med hx of HTN , DM, Obesity, PAO and afib since covid in March 2024.   Pt states he was admitted and cardioverted to sinus rhythm in March 2024.  He has  been on Metoprolol and eliquis daily and had not had another episode. Today morning he woke with palpitations and his smart watch told him his rate was all over the place.  Pt called his cardiologist but then decided to come to [place] ED .   He denied cp or sob.    Pt admitted that he drank four ETOH drinks last night including 2 tequila /soda drinks and 2 shots but that usually he does not drink that much.   Pt reports he smoke two - three cigs daily.    Pt denies current cpain/SOB,  no dizziness, no fever/chills,  no abd pain, no n/v/d,   no urinary or resp  complaints.          sub: rates 115-130bgpm off cardizem gtt  - denies ssx however rates reach 150-160 bpm with minimal ambulation.       ICU Vital Signs Last 24 Hrs  T(C): 36.7 (01 Sep 2024 09:15), Max: 36.8 (31 Aug 2024 20:53)  T(F): 98.1 (01 Sep 2024 09:15), Max: 98.2 (31 Aug 2024 20:53)  HR: 108 (01 Sep 2024 12:00) (65 - 116)  BP: 143/122 (01 Sep 2024 12:00) (125/98 - 143/122)  BP(mean): --  ABP: --  ABP(mean): --  RR: 18 (01 Sep 2024 09:15) (18 - 18)  SpO2: 98% (01 Sep 2024 09:15) (98% - 99%)    O2 Parameters below as of 01 Sep 2024 09:15  Patient On (Oxygen Delivery Method): room air        PHYSICAL EXAM:    Constitutional: NAD, awake and alert,   HEENT: PERR, EOMI, Normal Hearing, MMM  Neck: Soft and supple, No LAD, No JVD  Respiratory: Breath sounds are clear bilaterally, No wheezing, rales or rhonchi  Cardiovascular: S1 and S2, regular rate and rhythm, no Murmurs, gallops or rubs  Gastrointestinal: Bowel Sounds present, soft, nontender, nondistended, no guarding, no rebound  Extremities: No peripheral edema  Vascular: 2+ peripheral pulses  Neurological: A/O x 3, no focal deficits  Musculoskeletal: 5/5 strength b/l upper and lower extremities  Skin: No rashes    MEDICATIONS:  MEDICATIONS  (STANDING):  apixaban 5 milliGRAM(s) Oral every 12 hours  ascorbic acid 500 milliGRAM(s) Oral daily  cholecalciferol 1000 Unit(s) Oral daily  cyanocobalamin 1000 MICROGram(s) Oral daily  diltiazem Infusion 10 mG/Hr (10 mL/Hr) IV Continuous <Continuous>  metoprolol succinate ER 50 milliGRAM(s) Oral at bedtime  rosuvastatin 5 milliGRAM(s) Oral at bedtime  tiotropium 2.5 MICROgram(s) Inhaler 2 Puff(s) Inhalation daily      LABS: All Labs Reviewed:                        15.7   6.48  )-----------( 213      ( 30 Aug 2024 15:02 )             46.6     08-31    138  |  110<H>  |  18  ----------------------------<  141<H>  3.9   |  22  |  1.34<H>    Ca    9.0      31 Aug 2024 06:18  Phos  3.8     08-30  Mg     2.2     08-30    TPro  7.4  /  Alb  3.5  /  TBili  0.5  /  DBili  x   /  AST  24  /  ALT  36  /  AlkPhos  64  08-30    PT/INR - ( 30 Aug 2024 15:02 )   PT: 13.4 sec;   INR: 1.19 ratio         PTT - ( 30 Aug 2024 15:02 )  PTT:45.6 sec          Blood Culture:     RADIOLOGY/EKG: reviewed        
HPI:  Pt is a pleasant 39 yo m with Past med hx of HTN , DM, Obesity, PAO and afib since covid in 2024.   Pt states he was admitted and cardioverted to sinus rhythm in 2024.  He has  been on Metoprolol and eliquis daily and had not had another episode. Today morning he woke with palpitations and his smart watch told him his rate was all over the place.  Pt called his cardiologist but then decided to come to [place] ED .   He denied cp or sob.    Pt admitted that he drank four ETOH drinks last night including 2 tequila /soda drinks and 2 shots but that usually he does not drink that much.   Pt reports he smoke two - three cigs daily.    Pt denies current cpain/SOB,  no dizziness, no fever/chills,  no abd pain, no n/v/d,   no urinary or resp  complaints.      admitted for afib w/ RVR. also h/o HFrEF EF 40s.        Known h/o afib seen by our service previously.  SERA & CV performed successfully a few months ago.  Seen as OP.  CT coronary angio neg for coronary disease  Afib attributed to obesity BMI 62, pt has seen bariatric surgery.  Currently rate controlled in 90s. pt reports no symptoms now w/ rate control.  Tele afib 70-90.  Overnight 31 bpm bradycardia for 6 sec likely 2/2 PAO.    : weaned of diltiazem at 7 pm last night  rates controlled until this AM went up to 170s  but now in 100s pt asymptomatic.  : no complaints over night.  At 758a on tele  7.6 sec pause noted. pt was sleeping. persistent afib 100s.  9/3/24: Awake alert no CP, tele AF rate controlled with a 4.8 second pause      MEDICATIONS:  OUTPATIENT  Home Medications:  ascorbic acid 500 mg oral tablet: 1 tab(s) orally once a day (30 Aug 2024 19:35)  cholecalciferol 25 mcg (1000 intl units) oral tablet: 1 tab(s) orally once a day (30 Aug 2024 19:35)  Co-Q10 100 mg oral capsule: 1 cap(s) orally once a day (30 Aug 2024 19:35)  cyanocobalamin 1000 mcg oral tablet: 1 tab(s) orally once a day (30 Aug 2024 19:36)  metFORMIN 500 mg oral tablet: 2 tab(s) orally 2 times a day (30 Aug 2024 19:36)  rosuvastatin 5 mg oral tablet: 1 tab(s) orally once a day (30 Aug 2024 19:26)  spironolactone 25 mg oral tablet: 1 tab(s) orally once a day (30 Aug 2024 19:36)  tiotropium 18 mcg inhalation capsule: 1 cap(s) inhaled once a day (30 Aug 2024 19:36)      MEDICATIONS  (STANDING):  apixaban 5 milliGRAM(s) Oral every 12 hours  ascorbic acid 500 milliGRAM(s) Oral daily  cholecalciferol 1000 Unit(s) Oral daily  cyanocobalamin 1000 MICROGram(s) Oral daily  diltiazem    milliGRAM(s) Oral daily  lactobacillus acidophilus 1 Tablet(s) Oral daily  metoprolol succinate  milliGRAM(s) Oral daily  rosuvastatin 5 milliGRAM(s) Oral at bedtime  tiotropium 2.5 MICROgram(s) Inhaler 2 Puff(s) Inhalation daily    MEDICATIONS  (PRN):  albuterol    90 MICROgram(s) HFA Inhaler 2 Puff(s) Inhalation every 6 hours PRN for shortness of breath and/or wheezing  metoprolol tartrate Injectable 5 milliGRAM(s) IV Push every 6 hours PRN HR >120  senna 2 Tablet(s) Oral at bedtime PRN Constipation        Vital Signs Last 24 Hrs  T(C): 36.8 (03 Sep 2024 09:04), Max: 36.8 (03 Sep 2024 09:04)  T(F): 98.3 (03 Sep 2024 09:04), Max: 98.3 (03 Sep 2024 09:04)  HR: 90 (03 Sep 2024 10:05) (77 - 120)  BP: 152/88 (03 Sep 2024 09:04) (124/105 - 152/88)  BP(mean): --  RR: 18 (03 Sep 2024 09:04) (18 - 18)  SpO2: 97% (03 Sep 2024 09:04) (94% - 97%)    Parameters below as of 03 Sep 2024 09:04  Patient On (Oxygen Delivery Method): room air        Daily     Daily I&O's Summary      I&O's Detail      I&O's Summary      PHYSICAL EXAM:    Constitutional: NAD, awake and alert, obese  HEENT: PERR, EOMI,  No oral cyanosis.  Neck:  supple,  No JVD  Respiratory: Breath sounds are clear bilaterally,  Cardiovascular: S1 and S2, RRR  Gastrointestinal: Abd soft, nontender.   Extremities: No LE Edema  Neurological: A/O x 3, no focal deficits        LABS:                         15.7   6.48  )-----------( 213      ( 30 Aug 2024 15:02 )             46.6     31 Aug 2024 06:18    138    |  110    |  18     ----------------------------<  141    3.9     |  22     |  1.34   30 Aug 2024 15:02    139    |  111    |  18     ----------------------------<  126    4.0     |  21     |  1.59     Ca    9.0        31 Aug 2024 06:18  Ca    9.3        30 Aug 2024 15:02  Phos  3.8       30 Aug 2024 15:02  Mg     2.2       30 Aug 2024 15:02    TPro  7.4    /  Alb  3.5    /  TBili  0.5    /  DBili  x      /  AST  24     /  ALT  36     /  AlkPhos  64     30 Aug 2024 15:02      ===============================  ===============================  CARDIAC BIOMARKERS:  BNP      TROPONIN  Troponin I, High Sensitivity Result: 56.02 ng/L (24 @ 15:02)  Troponin I, High Sensitivity Result: 11.32 ng/L (24 @ 06:09)  Troponin I, High Sensitivity Result: 13.28 ng/L (24 @ 21:17)  Troponin I, High Sensitivity Result: 16.76 ng/L (24 @ 14:28)  Troponin I, High Sensitivity Result: 55.13 ng/L (22 @ 20:44)    ===============================  ===============================  BLOOD CULTURES:    Blood Culture:      @ 06:12  TSH: 2.98   @ 06:18  TSH: 2.38      ===============================  ===============================  EKG:  Aug 31st - afib rate controlled in 80s no ischemic changes. QTc 457     - NSR QTc 472 no ischemic changes.    ===============================  ECHO:  < from: SERA Complete w/Spect and Color (24 @ 16:08) >   Impression     Summary     A transesophageal echocardiogram was performed and included probe   placement in the esophagus posterior to the heart by the interpreting   physician, real-time image acquisition and interpretation utilizing 2-D,   color flow Doppler, pulsed wave and/or continuous wave with spectral   display. The patient received IV sedation. The procedure was monitored   with automatic blood pressure monitoring, telemetry tracings, and pulse   oximetry. The patient tolerated the procedure well and there were no   complications.     Reduced left ventricular systolic function. Estimated LVEF is 40%.   No evidence of right atrial, left atrial or left atrial appendage   thrombus.   Moderate mitral regurgitation.   Mild tricuspid regurgitation.   Mild pulmonic regurgitation.     Signature     ----------------------------------------------------------------   Electronically signed by Alexandria Kim MD(Interpreting   physician) on 2024 04:39 PM    ===============================      TRANSTHORACIC ECHOCARDIOGRAM REPORT  _   Pt. Name:       JORGE VALDOVINOS       Study Date:    2024  MRN:            [id number]            YOB: 1985_______________________________________________________________________________________     CONCLUSIONS:      1. Left ventricular systolic function is moderately decreased with an ejection fraction visually estimated at 35 to 40 %.   2. Unable to accurately assess segmental wall motion abnormalities due to irregular rhythm.   3. No pericardial effusion seen.    ______________________________________    ===============================    < from: TTE Echo Complete w/o Contrast w/ Doppler (24 @ 18:53) >   Impression     Summary     The left ventricle is dilated with moderate, diffuse hypokinesis;   estimated left ventricular ejection fraction is 35-40 %.   Moderate concentric left ventricular hypertrophy.   The left atrium is severely dilated.   The IVC is dilated with decreased respiratory variation.   Moderate mitral regurgitation.   Mild tricuspid valve regurgitation.   Mild pulmonary hypertension.     Note: Tachycardia is noted during the exam.     Signature     ----------------------------------------------------------------   Electronically signed by Bhargav Islas MD(Interpreting   physician) on 2024 07:21 PM   ----------------------------------------------------------------    < from: TTE Echo Complete w/o Contrast w/ Doppler (24 @ 18:53) >     Left Atrium   The left atrium is severely dilated.    < end of copied text >  < from: TTE Echo Complete w/o Contrast w/ Doppler (24 @ 18:53) >     Tricuspid Valve   Normal appearing tricuspid valve structure.   Mild tricuspid valve regurgitation.   Mild pulmonary hypertension.    < end of copied text >  < from: TTE Echo Complete w/o Contrast w/ Doppler (24 @ 18:53) >   RVSP:43 mmHg     < end of copied text >      ===============================  CT ANGIO HEART CORONARY IC  - ORDERED BY: JORDAN MNCEIL      PROCEDURE DATE:  2024  Patient: JORGE VALDOVINOS  : 1985  MRN: [id number]  ACC: [id number]  Order Date: 2024 11:47 AM  Exam: CT ANGIO HEART CORONARY    FINDINGS:    CALCIUM SCORE: The calculated Agatston score is 0.    Agatston Score:  Left main: 0.  Left anterior descendin.  Left circumflex: 0.  Right coronary: 0.    CORONARY: Right coronary artery dominance. No evidence for anomalous coronary arteries.  No plaque or stenosis within the proximal segment of coronary arteries. The mid-distal segments as   well as side branches are not evaluated secondary to motion and streak artifact caused by patient's body habitus.    MORPHOLOGY: Normal LV size and shape.  Normal end diastolic left ventricular wall thickness. The left atrium is dilated. No thrombus.  VALVES: The aortic valve is trileaflet. The anterior and posterior leaflets of the mitral valve are normal..  PERICARDIUM: Normal pericardial contour. No pericardial effusion.  AORTA: Normal in diameter.  NONCARDIAC FINDINGS: Within normal limits..      IMPRESSION:    1.  The calcium score is 0.  2.  No plaque or stenosis within the proximal segment of coronary arteries.   The mid-distal segments as well as side branches are not evaluated secondary to motion and streak artifact.    --- End of Report ---    PAUL SELLERS MD; Attending Radiologist  This document has been electronically signed. 2024 11:24AM  ===============================    Jann Suresh M.D.  Cardiology, [facility]  Cell: [phone]  Offices:   [phone] ([facility] Office)  [phone] ([facility] Office)   
REASON FOR VISIT:  AF    HPI:  38 year old man with a history of severe obesity, HTN , HFrEF, DM, PAO and PAF admitted with recurrence of symptomatic atrial fibrillation.    Known h/o afib seen by our service previously.  SERA & CV performed successfully a few months ago.  Seen as OP.  CT coronary angio neg for coronary disease  Afib attributed to obesity BMI 62, pt has seen bariatric surgery.    9/1/24: weaned of diltiazem at 7 pm last night rates controlled until this AM went up to 170s, but now in 100s pt asymptomatic.  9/2/24: no complaints over night.  At 758a on tele, 7.6 sec pause noted. pt was sleeping. persistent afib 100s.  9/3/24: Awake alert no CP, tele AF rate controlled with a 4.8 second pause  9/4/24: no cardiac complaints, SERA/DCCV postoponed for 9/5, Tele: Afib   9/5/24:  No new complaints  9/6/24: s/p successful SERA/DCCV, started on tikosyn, no cardiac complaints    MEDICATIONS:  OUTPATIENT  Home Medications:  ascorbic acid 500 mg oral tablet: 1 tab(s) orally once a day (30 Aug 2024 19:35)  cholecalciferol 25 mcg (1000 intl units) oral tablet: 1 tab(s) orally once a day (30 Aug 2024 19:35)  Co-Q10 100 mg oral capsule: 1 cap(s) orally once a day (30 Aug 2024 19:35)  cyanocobalamin 1000 mcg oral tablet: 1 tab(s) orally once a day (30 Aug 2024 19:36)  metFORMIN 500 mg oral tablet: 2 tab(s) orally 2 times a day (30 Aug 2024 19:36)  rosuvastatin 5 mg oral tablet: 1 tab(s) orally once a day (30 Aug 2024 19:26)  spironolactone 25 mg oral tablet: 1 tab(s) orally once a day (30 Aug 2024 19:36)  tiotropium 18 mcg inhalation capsule: 1 cap(s) inhaled once a day (30 Aug 2024 19:36)    MEDICATIONS  (STANDING):  apixaban 5 milliGRAM(s) Oral every 12 hours  ascorbic acid 500 milliGRAM(s) Oral daily  cholecalciferol 1000 Unit(s) Oral daily  cyanocobalamin 1000 MICROGram(s) Oral daily  dextrose 5%. 1000 milliLiter(s) (100 mL/Hr) IV Continuous <Continuous>  dextrose 5%. 1000 milliLiter(s) (50 mL/Hr) IV Continuous <Continuous>  dextrose 50% Injectable 25 Gram(s) IV Push once  dextrose 50% Injectable 12.5 Gram(s) IV Push once  dextrose 50% Injectable 25 Gram(s) IV Push once  dofetilide. 250 MICROGram(s) Oral every 12 hours  glucagon  Injectable 1 milliGRAM(s) IntraMuscular once  insulin lispro (ADMELOG) corrective regimen sliding scale   SubCutaneous three times a day before meals  insulin lispro (ADMELOG) corrective regimen sliding scale   SubCutaneous at bedtime  lactobacillus acidophilus 1 Tablet(s) Oral daily  magnesium oxide 400 milliGRAM(s) Oral at bedtime  metoprolol succinate  milliGRAM(s) Oral daily  rosuvastatin 5 milliGRAM(s) Oral at bedtime  sacubitril 24 mG/valsartan 26 mG 1 Tablet(s) Oral two times a day  spironolactone 25 milliGRAM(s) Oral daily  tiotropium 2.5 MICROgram(s) Inhaler 2 Puff(s) Inhalation daily    MEDICATIONS  (PRN):  albuterol    90 MICROgram(s) HFA Inhaler 2 Puff(s) Inhalation every 6 hours PRN for shortness of breath and/or wheezing  dextrose Oral Gel 15 Gram(s) Oral once PRN Blood Glucose LESS THAN 70 milliGRAM(s)/deciliter  metoprolol tartrate Injectable 5 milliGRAM(s) IV Push every 6 hours PRN HR >120  senna 2 Tablet(s) Oral at bedtime PRN Constipation    Vital Signs Last 24 Hrs  T(C): 36.6 (06 Sep 2024 08:46), Max: 36.9 (05 Sep 2024 20:10)  T(F): 97.9 (06 Sep 2024 08:46), Max: 98.4 (05 Sep 2024 20:10)  HR: 80 (06 Sep 2024 08:50) (77 - 80)  BP: 147/113 (06 Sep 2024 08:46) (129/71 - 147/113)  BP(mean): --  RR: 18 (06 Sep 2024 08:46) (18 - 18)  SpO2: 97% (06 Sep 2024 08:50) (96% - 97%)    Parameters below as of 06 Sep 2024 08:50  Patient On (Oxygen Delivery Method): room air    PHYSICAL EXAM:  Constitutional: NAD, awake and alert, obese  HEENT: PERR, EOMI,  No oral cyanosis.  Respiratory: Breath sounds are clear bilaterally,  Cardiovascular: S1 and S2, tachycardic, irregular  Gastrointestinal: Abd soft, nontender.     LABS: reviewed                            15.9   4.96  )-----------( 208      ( 05 Sep 2024 07:01 )             46.9     09-06    142  |  112<H>  |  20  ----------------------------<  135<H>  4.1   |  23  |  1.35<H>    Ca    8.9      06 Sep 2024 06:52  Phos  3.4     09-06  Mg     2.3     09-06      - TroponinI hsT: <-56.02    Radiology/EKG/TTE/SERA: reviewed     < from: SERA W or WO Ultrasound Enhancing Agent (09.05.24 @ 09:55) >    _______________________________________________________________________________________     CONCLUSIONS:      1. Left ventricular systolic function is severely decreased.   2. Moderate mitral regurgitation.   3. Mild aortic regurgitation.   4. No evidence of left atrial or left atrial appendage thrombus.    < end of copied text >      TTE Limited W or WO Ultrasound Enhancing Agent (08.31.24 @ 10:37) >   1. Left ventricular systolic function is moderately decreased with an ejection fraction visually estimated at 35 to 40 %.   2. Unable to accurately assess segmental wall motion abnormalities due to irregular rhythm.   3. No pericardial effusion seen.    Order Date: 6/26/2024 11:47 AM  Exam: CT ANGIO HEART CORONARY  1.  The calcium score is 0.  2.  No plaque or stenosis within the proximal segment of coronary arteries.  The mid-distal segments as well as side branches are not evaluated secondary to motion and streak artifact.  
REASON FOR VISIT:  AF    HPI:  38 year old man with a history of severe obesity, HTN , HFrEF, DM, PAO and PAF admitted with recurrence of symptomatic atrial fibrillation.    Known h/o afib seen by our service previously.  SERA & CV performed successfully a few months ago.  Seen as OP.  CT coronary angio neg for coronary disease  Afib attributed to obesity BMI 62, pt has seen bariatric surgery.    9/1/24: weaned of diltiazem at 7 pm last night rates controlled until this AM went up to 170s, but now in 100s pt asymptomatic.  9/2/24: no complaints over night.  At 758a on tele, 7.6 sec pause noted. pt was sleeping. persistent afib 100s.  9/3/24: Awake alert no CP, tele AF rate controlled with a 4.8 second pause  9/4/24: no cardiac complaints, SERA/DCCV postoponed for 9/5, Tele: Afib   9/5/24:  No new complaints  9/6/24: s/p successful SERA/DCCV, started on tikosyn, no cardiac complaints  9/7/24:  Feels well; no complaints; eager to return home    MEDICATIONS  (STANDING):  apixaban 5 milliGRAM(s) Oral every 12 hours  ascorbic acid 500 milliGRAM(s) Oral daily  cholecalciferol 1000 Unit(s) Oral daily  cyanocobalamin 1000 MICROGram(s) Oral daily  dofetilide. 250 MICROGram(s) Oral every 12 hours  glucagon  Injectable 1 milliGRAM(s) IntraMuscular once  insulin lispro (ADMELOG) corrective regimen sliding scale   SubCutaneous at bedtime  insulin lispro (ADMELOG) corrective regimen sliding scale   SubCutaneous three times a day before meals  lactobacillus acidophilus 1 Tablet(s) Oral daily  magnesium oxide 400 milliGRAM(s) Oral at bedtime  metoprolol succinate  milliGRAM(s) Oral daily  rosuvastatin 5 milliGRAM(s) Oral at bedtime  sacubitril 24 mG/valsartan 26 mG 1 Tablet(s) Oral two times a day  spironolactone 25 milliGRAM(s) Oral daily  tiotropium 2.5 MICROgram(s) Inhaler 2 Puff(s) Inhalation daily    MEDICATIONS  (PRN):  albuterol    90 MICROgram(s) HFA Inhaler 2 Puff(s) Inhalation every 6 hours PRN for shortness of breath and/or wheezing  dextrose Oral Gel 15 Gram(s) Oral once PRN Blood Glucose LESS THAN 70 milliGRAM(s)/deciliter  metoprolol tartrate Injectable 5 milliGRAM(s) IV Push every 6 hours PRN HR >120  senna 2 Tablet(s) Oral at bedtime PRN Constipation    Vital Signs Last 24 Hrs  T(C): 36.6 (06 Sep 2024 08:46), Max: 36.6 (06 Sep 2024 08:46)  T(F): 97.9 (06 Sep 2024 08:46), Max: 97.9 (06 Sep 2024 08:46)  HR: 80 (06 Sep 2024 08:50) (80 - 80)  BP: 147/113 (06 Sep 2024 08:46) (147/113 - 147/113)  RR: 18 (06 Sep 2024 08:46) (18 - 18)  SpO2: 97% (06 Sep 2024 08:50) (97% - 97%)  Patient On (Oxygen Delivery Method): room air    PHYSICAL EXAM:  Constitutional: NAD, awake and alert, obese  HEENT: PERR, EOMI,  No oral cyanosis.  Respiratory: Breath sounds are clear bilaterally,  Cardiovascular: S1 and S2, regular  Gastrointestinal: Abd soft, nontender.     LABS:     142  |  112<H>  |  20  ----------------------------<  135<H>  4.1   |  23  |  1.35<H>    Ca    8.9      06 Sep 2024 06:52  Phos  3.4     09-06  Mg     2.3     09-06         SERA W or WO Ultrasound Enhancing Agent (09.05.24 @ 09:55) >   1. Left ventricular systolic function is severely decreased.   2. Moderate mitral regurgitation.   3. Mild aortic regurgitation.   4. No evidence of left atrial or left atrial appendage thrombus.    TTE Limited W or WO Ultrasound Enhancing Agent (08.31.24 @ 10:37) >   1. Left ventricular systolic function is moderately decreased with an ejection fraction visually estimated at 35 to 40 %.   2. Unable to accurately assess segmental wall motion abnormalities due to irregular rhythm.   3. No pericardial effusion seen.    Order Date: 6/26/2024 11:47 AM  Exam: CT ANGIO HEART CORONARY  1.  The calcium score is 0.  2.  No plaque or stenosis within the proximal segment of coronary arteries.  The mid-distal segments as well as side branches are not evaluated secondary to motion and streak artifact.  
HPI:  Pt is a pleasant 39 yo m with Past med hx of HTN , DM, Obesity, PAO and afib since covid in 2024.   Pt states he was admitted and cardioverted to sinus rhythm in 2024.  He has  been on Metoprolol and eliquis daily and had not had another episode. Today morning he woke with palpitations and his smart watch told him his rate was all over the place.  Pt called his cardiologist but then decided to come to [place] ED .   He denied cp or sob.    Pt admitted that he drank four ETOH drinks last night including 2 tequila /soda drinks and 2 shots but that usually he does not drink that much.   Pt reports he smoke two - three cigs daily.    Pt denies current cpain/SOB,  no dizziness, no fever/chills,  no abd pain, no n/v/d,   no urinary or resp  complaints.      admitted for afib w/ RVR. also h/o HFrEF EF 40s.    Known h/o afib seen by our service previously.  SERA & CV performed successfully a few months ago.  Seen as OP.  CT coronary angio neg for coronary disease  Afib attributed to obesity BMI 62, pt has seen bariatric surgery.  Currently rate controlled in 90s. pt reports no symptoms now w/ rate control.  Tele afib 70-90.  Overnight 31 bpm bradycardia for 6 sec likely 2/2 PAO.    24: weaned of diltiazem at 7 pm last night  rates controlled until this AM went up to 170s  but now in 100s pt asymptomatic.  : no complaints over night.  At 758a on tele  7.6 sec pause noted. pt was sleeping. persistent afib 100s.      MEDICATIONS:  OUTPATIENT  Home Medications:  ascorbic acid 500 mg oral tablet: 1 tab(s) orally once a day (30 Aug 2024 19:35)  cholecalciferol 25 mcg (1000 intl units) oral tablet: 1 tab(s) orally once a day (30 Aug 2024 19:35)  Co-Q10 100 mg oral capsule: 1 cap(s) orally once a day (30 Aug 2024 19:35)  cyanocobalamin 1000 mcg oral tablet: 1 tab(s) orally once a day (30 Aug 2024 19:36)  metFORMIN 500 mg oral tablet: 2 tab(s) orally 2 times a day (30 Aug 2024 19:36)  rosuvastatin 5 mg oral tablet: 1 tab(s) orally once a day (30 Aug 2024 19:26)  spironolactone 25 mg oral tablet: 1 tab(s) orally once a day (30 Aug 2024 19:36)  tiotropium 18 mcg inhalation capsule: 1 cap(s) inhaled once a day (30 Aug 2024 19:36)      INPATIENT  MEDICATIONS  (STANDING):  apixaban 5 milliGRAM(s) Oral every 12 hours  ascorbic acid 500 milliGRAM(s) Oral daily  cholecalciferol 1000 Unit(s) Oral daily  cyanocobalamin 1000 MICROGram(s) Oral daily  diltiazem Infusion 5 mG/Hr (5 mL/Hr) IV Continuous - discussed w/ nursing- this was d/c'd   lactobacillus acidophilus 1 Tablet(s) Oral daily  metoprolol succinate  milliGRAM(s) Oral daily  rosuvastatin 5 milliGRAM(s) Oral at bedtime  tiotropium 2.5 MICROgram(s) Inhaler 2 Puff(s) Inhalation daily    MEDICATIONS  (PRN):  albuterol    90 MICROgram(s) HFA Inhaler 2 Puff(s) Inhalation every 6 hours PRN for shortness of breath and/or wheezing  metoprolol tartrate Injectable 5 milliGRAM(s) IV Push every 6 hours PRN HR >120  senna 2 Tablet(s) Oral at bedtime PRN Constipation    Vital Signs Last 24 Hrs  T(C): 36.4 (02 Sep 2024 08:05), Max: 36.8 (01 Sep 2024 20:00)  T(F): 97.5 (02 Sep 2024 08:05), Max: 98.2 (01 Sep 2024 20:00)  HR: 95 (02 Sep 2024 08:05) (72 - 134)  BP: 110/84 (02 Sep 2024 08:05) (110/84 - 143/122)  BP(mean): 117 (01 Sep 2024 18:44) (117 - 117)  RR: 17 (02 Sep 2024 08:05) (17 - 18)  SpO2: 99% (02 Sep 2024 08:05) (93% - 99%)    Parameters below as of 02 Sep 2024 08:05  Patient On (Oxygen Delivery Method): room air    Daily     Daily I&O's Summary      I&O's Detail      I&O's Summary      PHYSICAL EXAM:    Constitutional: NAD, awake and alert, obese  HEENT: PERR, EOMI,  No oral cyanosis.  Neck:  supple,  No JVD  Respiratory: Breath sounds are clear bilaterally, No wheezing, rales or rhonchi  Cardiovascular: S1 and S2, regular rate and rhythm, no Murmurs, gallops or rubs  Gastrointestinal: Bowel Sounds present, soft, nontender.   Extremities: No peripheral edema. No clubbing or cyanosis.  Vascular: 2+ peripheral pulses  Neurological: A/O x 3, no focal deficits        ===============================  ===============================  LABS:                         15.7   6.48  )-----------( 213      ( 30 Aug 2024 15:02 )             46.6     31 Aug 2024 06:18    138    |  110    |  18     ----------------------------<  141    3.9     |  22     |  1.34   30 Aug 2024 15:02    139    |  111    |  18     ----------------------------<  126    4.0     |  21     |  1.59     Ca    9.0        31 Aug 2024 06:18  Ca    9.3        30 Aug 2024 15:02  Phos  3.8       30 Aug 2024 15:02  Mg     2.2       30 Aug 2024 15:02    TPro  7.4    /  Alb  3.5    /  TBili  0.5    /  DBili  x      /  AST  24     /  ALT  36     /  AlkPhos  64     30 Aug 2024 15:02      ===============================  ===============================  CARDIAC BIOMARKERS:  BNP      TROPONIN  Troponin I, High Sensitivity Result: 56.02 ng/L (24 @ 15:02)  Troponin I, High Sensitivity Result: 11.32 ng/L (24 @ 06:09)  Troponin I, High Sensitivity Result: 13.28 ng/L (24 @ 21:17)  Troponin I, High Sensitivity Result: 16.76 ng/L (24 @ 14:28)  Troponin I, High Sensitivity Result: 55.13 ng/L (22 @ 20:44)    ===============================  ===============================  BLOOD CULTURES:    Blood Culture:      @ 06:12  TSH: 2.98   @ 06:18  TSH: 2.38      ===============================  ===============================  EKG:  Aug 31st - afib rate controlled in 80s no ischemic changes. QTc 457     - NSR QTc 472 no ischemic changes.    ===============================  ECHO:  < from: SERA Complete w/Spect and Color (24 @ 16:08) >   Impression     Summary     A transesophageal echocardiogram was performed and included probe   placement in the esophagus posterior to the heart by the interpreting   physician, real-time image acquisition and interpretation utilizing 2-D,   color flow Doppler, pulsed wave and/or continuous wave with spectral   display. The patient received IV sedation. The procedure was monitored   with automatic blood pressure monitoring, telemetry tracings, and pulse   oximetry. The patient tolerated the procedure well and there were no   complications.     Reduced left ventricular systolic function. Estimated LVEF is 40%.   No evidence of right atrial, left atrial or left atrial appendage   thrombus.   Moderate mitral regurgitation.   Mild tricuspid regurgitation.   Mild pulmonic regurgitation.     Signature     ----------------------------------------------------------------   Electronically signed by Alexandria Kim MD(Interpreting   physician) on 2024 04:39 PM    ===============================      TRANSTHORACIC ECHOCARDIOGRAM REPORT  _   Pt. Name:       JORGE VALDOVINOS       Study Date:    2024  MRN:            [id number]            YOB: 1985_______________________________________________________________________________________     CONCLUSIONS:      1. Left ventricular systolic function is moderately decreased with an ejection fraction visually estimated at 35 to 40 %.   2. Unable to accurately assess segmental wall motion abnormalities due to irregular rhythm.   3. No pericardial effusion seen.    ______________________________________    ===============================    < from: TTE Echo Complete w/o Contrast w/ Doppler (24 @ 18:53) >   Impression     Summary     The left ventricle is dilated with moderate, diffuse hypokinesis;   estimated left ventricular ejection fraction is 35-40 %.   Moderate concentric left ventricular hypertrophy.   The left atrium is severely dilated.   The IVC is dilated with decreased respiratory variation.   Moderate mitral regurgitation.   Mild tricuspid valve regurgitation.   Mild pulmonary hypertension.     Note: Tachycardia is noted during the exam.     Signature     ----------------------------------------------------------------   Electronically signed by Bhargav Islas MD(Interpreting   physician) on 2024 07:21 PM   ----------------------------------------------------------------    < from: TTE Echo Complete w/o Contrast w/ Doppler (24 @ 18:53) >     Left Atrium   The left atrium is severely dilated.    < end of copied text >  < from: TTE Echo Complete w/o Contrast w/ Doppler (24 @ 18:53) >     Tricuspid Valve   Normal appearing tricuspid valve structure.   Mild tricuspid valve regurgitation.   Mild pulmonary hypertension.    < end of copied text >  < from: TTE Echo Complete w/o Contrast w/ Doppler (24 @ 18:53) >   RVSP:43 mmHg     < end of copied text >      ===============================  CT ANGIO HEART CORONARY IC  - ORDERED BY: JORDAN MCNEIL      PROCEDURE DATE:  2024  Patient: JORGE VALDOVINOS  : 1985  MRN: [id number]  ACC: [id number]  Order Date: 2024 11:47 AM  Exam: CT ANGIO HEART CORONARY    FINDINGS:    CALCIUM SCORE: The calculated Agatston score is 0.    Agatston Score:  Left main: 0.  Left anterior descendin.  Left circumflex: 0.  Right coronary: 0.    CORONARY: Right coronary artery dominance. No evidence for anomalous coronary arteries.  No plaque or stenosis within the proximal segment of coronary arteries. The mid-distal segments as   well as side branches are not evaluated secondary to motion and streak artifact caused by patient's body habitus.    MORPHOLOGY: Normal LV size and shape.  Normal end diastolic left ventricular wall thickness. The left atrium is dilated. No thrombus.  VALVES: The aortic valve is trileaflet. The anterior and posterior leaflets of the mitral valve are normal..  PERICARDIUM: Normal pericardial contour. No pericardial effusion.  AORTA: Normal in diameter.  NONCARDIAC FINDINGS: Within normal limits..      IMPRESSION:    1.  The calcium score is 0.  2.  No plaque or stenosis within the proximal segment of coronary arteries.   The mid-distal segments as well as side branches are not evaluated secondary to motion and streak artifact.    --- End of Report ---    PAUL SELLERS MD; Attending Radiologist  This document has been electronically signed. 2024 11:24AM  ===============================    Jann Suresh M.D.  Cardiology, [facility]  Cell: [phone]  Offices:    ([facility] Office)  [phone] ([facility] Office)   
HPI:  Pt is a pleasant 39 yo m with Past med hx of HTN , DM, Obesity, PAO and afib since covid in 2024.   Pt states he was admitted and cardioverted to sinus rhythm in 2024.  He has  been on Metoprolol and eliquis daily and had not had another episode. Today morning he woke with palpitations and his smart watch told him his rate was all over the place.  Pt called his cardiologist but then decided to come to [place] ED .   He denied cp or sob.    Pt admitted that he drank four ETOH drinks last night including 2 tequila /soda drinks and 2 shots but that usually he does not drink that much.   Pt reports he smoke two - three cigs daily.    Pt denies current cpain/SOB,  no dizziness, no fever/chills,  no abd pain, no n/v/d,   no urinary or resp  complaints.      admitted for afib w/ RVR.  Known h/o afib seen by our service previously.  SERA & CV performed successfully a few months ago.  Seen as OP.  CT coronary angio neg for coronary disease  Afib attributed to obesity BMI 62, pt has seen bariatric surgery.  Currently rate controlled in 90s. pt reports no symptoms now w/ rate control.  Tele afib 70-90.  Overnight 31 bpm bradycardia for 6 sec likely 2/2 PAO.  also h/o HFrEF EF 40s.    MEDICATIONS:  OUTPATIENT  Home Medications:  ascorbic acid 500 mg oral tablet: 1 tab(s) orally once a day (30 Aug 2024 19:35)  cholecalciferol 25 mcg (1000 intl units) oral tablet: 1 tab(s) orally once a day (30 Aug 2024 19:35)  Co-Q10 100 mg oral capsule: 1 cap(s) orally once a day (30 Aug 2024 19:35)  cyanocobalamin 1000 mcg oral tablet: 1 tab(s) orally once a day (30 Aug 2024 19:36)  metFORMIN 500 mg oral tablet: 2 tab(s) orally 2 times a day (30 Aug 2024 19:36)  rosuvastatin 5 mg oral tablet: 1 tab(s) orally once a day (30 Aug 2024 19:26)  spironolactone 25 mg oral tablet: 1 tab(s) orally once a day (30 Aug 2024 19:36)  tiotropium 18 mcg inhalation capsule: 1 cap(s) inhaled once a day (30 Aug 2024 19:36)        INPATIENT  MEDICATIONS  (STANDING):  apixaban 5 milliGRAM(s) Oral every 12 hours  ascorbic acid 500 milliGRAM(s) Oral daily  cholecalciferol 1000 Unit(s) Oral daily  cyanocobalamin 1000 MICROGram(s) Oral daily  diltiazem Infusion 10 mG/Hr (10 mL/Hr) IV Continuous <Continuous>  metoprolol succinate  milliGRAM(s) Oral daily  metoprolol tartrate 50 milliGRAM(s) Oral once  rosuvastatin 5 milliGRAM(s) Oral at bedtime  tiotropium 2.5 MICROgram(s) Inhaler 2 Puff(s) Inhalation daily    MEDICATIONS  (PRN):  albuterol    90 MICROgram(s) HFA Inhaler 2 Puff(s) Inhalation every 6 hours PRN for shortness of breath and/or wheezing  senna 2 Tablet(s) Oral at bedtime PRN Constipation          Vital Signs Last 24 Hrs  T(C): 36.5 (31 Aug 2024 12:18), Max: 37.1 (30 Aug 2024 19:25)  T(F): 97.7 (31 Aug 2024 12:18), Max: 98.8 (30 Aug 2024 19:25)  HR: 93 (31 Aug 2024 15:30) (63 - 104)  BP: 125/98 (31 Aug 2024 15:30) (109/80 - 158/88)  BP(mean): 94 (30 Aug 2024 20:25) (94 - 100)  RR: 19 (31 Aug 2024 12:18) (18 - 20)  SpO2: 99% (31 Aug 2024 12:18) (97% - 99%)    Parameters below as of 31 Aug 2024 12:18  Patient On (Oxygen Delivery Method): room air    Daily     Daily Weight in k.6 (30 Aug 2024 21:00)I&O's Summary    31 Aug 2024 07:01  -  31 Aug 2024 16:21  --------------------------------------------------------  IN: 0 mL / OUT: 700 mL / NET: -700 mL        I&O's Detail    31 Aug 2024 07:01  -  31 Aug 2024 16:21  --------------------------------------------------------  IN:  Total IN: 0 mL    OUT:    Voided (mL): 700 mL  Total OUT: 700 mL    Total NET: -700 mL          I&O's Summary    31 Aug 2024 07:01  -  31 Aug 2024 16:21  --------------------------------------------------------  IN: 0 mL / OUT: 700 mL / NET: -700 mL        PHYSICAL EXAM:    Constitutional: NAD, awake and alert,  HEENT: PERR, EOMI,  No oral cyanosis.  Neck:  supple,  No JVD  Respiratory: Breath sounds are clear bilaterally, No wheezing, rales or rhonchi  Cardiovascular: S1 and S2, regular rate and rhythm, no Murmurs, gallops or rubs  Gastrointestinal: Bowel Sounds present, soft, nontender.   Extremities: No peripheral edema. No clubbing or cyanosis.  Vascular: 2+ peripheral pulses  Neurological: A/O x 3, no focal deficits      ===============================  ===============================  LABS:                         15.7   6.48  )-----------( 213      ( 30 Aug 2024 15:02 )             46.6     31 Aug 2024 06:18    138    |  110    |  18     ----------------------------<  141    3.9     |  22     |  1.34   30 Aug 2024 15:02    139    |  111    |  18     ----------------------------<  126    4.0     |  21     |  1.59     Ca    9.0        31 Aug 2024 06:18  Ca    9.3        30 Aug 2024 15:02  Phos  3.8       30 Aug 2024 15:02  Mg     2.2       30 Aug 2024 15:02    TPro  7.4    /  Alb  3.5    /  TBili  0.5    /  DBili  x      /  AST  24     /  ALT  36     /  AlkPhos  64     30 Aug 2024 15:02    PT/INR - ( 30 Aug 2024 15:02 )   PT: 13.4 sec;   INR: 1.19 ratio         PTT - ( 30 Aug 2024 15:02 )  PTT:45.6 sec  ===============================  ===============================  CARDIAC BIOMARKERS:  BNP  Pro-Brain Natriuretic Peptide: 1506 pg/mL (24 @ 06:09)   Pro-Brain Natriuretic Peptide: 1746 pg/mL (24 @ 04:58)   Pro-Brain Natriuretic Peptide: 2441 pg/mL (24 @ 14:28)       TROPONIN  Troponin I, High Sensitivity Result: 56.02 ng/L (24 @ 15:02)  Troponin I, High Sensitivity Result: 11.32 ng/L (24 @ 06:09)  Troponin I, High Sensitivity Result: 13.28 ng/L (24 @ 21:17)  Troponin I, High Sensitivity Result: 16.76 ng/L (24 @ 14:28)  Troponin I, High Sensitivity Result: 55.13 ng/L (22 @ 20:44)    ===============================  ===============================     @ 06:18  TSH: 2.38    ===============================  ===============================  EKG:  Aug 31st - afib rate controlled in 80s no ischemic changes. QTc 457     - NSR QTc 472 no ischemic changes.    ===============================  ECHO:  < from: SERA Complete w/Spect and Color (24 @ 16:08) >   Impression     Summary     A transesophageal echocardiogram was performed and included probe   placement in the esophagus posterior to the heart by the interpreting   physician, real-time image acquisition and interpretation utilizing 2-D,   color flow Doppler, pulsed wave and/or continuous wave with spectral   display. The patient received IV sedation. The procedure was monitored   with automatic blood pressure monitoring, telemetry tracings, and pulse   oximetry. The patient tolerated the procedure well and there were no   complications.     Reduced left ventricular systolic function. Estimated LVEF is 40%.   No evidence of right atrial, left atrial or left atrial appendage   thrombus.   Moderate mitral regurgitation.   Mild tricuspid regurgitation.   Mild pulmonic regurgitation.     Signature     ----------------------------------------------------------------   Electronically signed by Alexandria Kim MD(Interpreting   physician) on 2024 04:39 PM    ===============================  < from: TTE Echo Complete w/o Contrast w/ Doppler (24 @ 18:53) >   Impression     Summary     The left ventricle is dilated with moderate, diffuse hypokinesis;   estimated left ventricular ejection fraction is 35-40 %.   Moderate concentric left ventricular hypertrophy.   The left atrium is severely dilated.   The IVC is dilated with decreased respiratory variation.   Moderate mitral regurgitation.   Mild tricuspid valve regurgitation.   Mild pulmonary hypertension.     Note: Tachycardia is noted during the exam.     Signature     ----------------------------------------------------------------   Electronically signed by Bhargav Islas MD(Interpreting   physician) on 2024 07:21 PM   ----------------------------------------------------------------    < from: TTE Echo Complete w/o Contrast w/ Doppler (24 @ 18:53) >     Left Atrium   The left atrium is severely dilated.    < end of copied text >  < from: TTE Echo Complete w/o Contrast w/ Doppler (24 @ 18:53) >     Tricuspid Valve   Normal appearing tricuspid valve structure.   Mild tricuspid valve regurgitation.   Mild pulmonary hypertension.    < end of copied text >  < from: TTE Echo Complete w/o Contrast w/ Doppler (24 @ 18:53) >   RVSP:43 mmHg     < end of copied text >      ===============================  CT ANGIO HEART CORONARY IC  - ORDERED BY: JORDAN MCNEIL      PROCEDURE DATE:  2024  Patient: JORGE VALDOVINOS  : 1985  MRN: [id number]  ACC: [id number]  Order Date: 2024 11:47 AM  Exam: CT ANGIO HEART CORONARY    FINDINGS:    CALCIUM SCORE: The calculated Agatston score is 0.    Agatston Score:  Left main: 0.  Left anterior descendin.  Left circumflex: 0.  Right coronary: 0.    CORONARY: Right coronary artery dominance. No evidence for anomalous coronary arteries.  No plaque or stenosis within the proximal segment of coronary arteries. The mid-distal segments as   well as side branches are not evaluated secondary to motion and streak artifact caused by patient's body habitus.    MORPHOLOGY: Normal LV size and shape.  Normal end diastolic left ventricular wall thickness. The left atrium is dilated. No thrombus.  VALVES: The aortic valve is trileaflet. The anterior and posterior leaflets of the mitral valve are normal..  PERICARDIUM: Normal pericardial contour. No pericardial effusion.  AORTA: Normal in diameter.  NONCARDIAC FINDINGS: Within normal limits..      IMPRESSION:    1.  The calcium score is 0.  2.  No plaque or stenosis within the proximal segment of coronary arteries.   The mid-distal segments as well as side branches are not evaluated secondary to motion and streak artifact.    --- End of Report ---    PAUL SELLERS MD; Attending Radiologist  This document has been electronically signed. 2024 11:24AM                  ==============================  Jann Suresh M.D.  Cardiology, [facility]  Cell: [phone]  Offices:    ([facility] Office)  [phone] ([facility] Office)   
REASON FOR VISIT:  AF    HPI:  38 year old man with a history of severe obesity, HTN , HFrEF, DM, PAO and PAF admitted with recurrence of symptomatic atrial fibrillation.    Known h/o afib seen by our service previously.  SERA & CV performed successfully a few months ago.  Seen as OP.  CT coronary angio neg for coronary disease  Afib attributed to obesity BMI 62, pt has seen bariatric surgery.    9/1/24: weaned of diltiazem at 7 pm last night rates controlled until this AM went up to 170s, but now in 100s pt asymptomatic.  9/2/24: no complaints over night.  At 758a on tele, 7.6 sec pause noted. pt was sleeping. persistent afib 100s.  9/3/24: Awake alert no CP, tele AF rate controlled with a 4.8 second pause  9/4/24: no cardiac complaints, SERA/DCCV postoponed for 9/5, Tele: Afib   9/5/24:  No new complaints  9/6/24: s/p successful SERA/DCCV, started on tikosyn, no cardiac complaints  9/7/24:  Feels well; no complaints; eager to return home  9/8/24: Feels well; ambulating, tolerating diet; no complaints.    MEDICATIONS  (STANDING):  apixaban 5 milliGRAM(s) Oral every 12 hours  ascorbic acid 500 milliGRAM(s) Oral daily  cholecalciferol 1000 Unit(s) Oral daily  cyanocobalamin 1000 MICROGram(s) Oral daily  dofetilide. 250 MICROGram(s) Oral every 12 hours  lactobacillus acidophilus 1 Tablet(s) Oral daily  magnesium oxide 400 milliGRAM(s) Oral at bedtime  metoprolol succinate  milliGRAM(s) Oral daily  rosuvastatin 5 milliGRAM(s) Oral at bedtime  sacubitril 24 mG/valsartan 26 mG 1 Tablet(s) Oral two times a day  spironolactone 25 milliGRAM(s) Oral daily  tiotropium 2.5 MICROgram(s) Inhaler 2 Puff(s) Inhalation daily    MEDICATIONS  (PRN):  albuterol    90 MICROgram(s) HFA Inhaler 2 Puff(s) Inhalation every 6 hours PRN for shortness of breath and/or wheezing  metoprolol tartrate Injectable 5 milliGRAM(s) IV Push every 6 hours PRN HR >120  senna 2 Tablet(s) Oral at bedtime PRN Constipation    Vital Signs Last 24 Hrs  T(C): 36.6 (07 Sep 2024 20:38), Max: 36.6 (07 Sep 2024 20:38)  T(F): 97.9 (07 Sep 2024 20:38), Max: 97.9 (07 Sep 2024 20:38)  HR: 72 (07 Sep 2024 20:38) (72 - 81)  BP: 144/82 (07 Sep 2024 20:38) (144/82 - 144/82)  RR: 19 (07 Sep 2024 20:38) (19 - 19)  SpO2: 98% (07 Sep 2024 20:38) (98% - 98%)  Patient On (Oxygen Delivery Method): room air    PHYSICAL EXAM:  Constitutional: NAD, awake and alert, obese, seated at edge of bed  Respiratory: Breath sounds are clear bilaterally,  Cardiovascular: S1 and S2, regular  Gastrointestinal: Abd soft, nontender.     LABS:     140  |  112<H>  |  18  ----------------------------<  139<H>  4.3   |  21<L>  |  1.38<H>    Ca    9.4      08 Sep 2024 06:46  Phos  3.9     09-08  Mg     2.2     09-08    SERA W or WO Ultrasound Enhancing Agent (09.05.24 @ 09:55) >   1. Left ventricular systolic function is severely decreased.   2. Moderate mitral regurgitation.   3. Mild aortic regurgitation.   4. No evidence of left atrial or left atrial appendage thrombus.    TTE Limited W or WO Ultrasound Enhancing Agent (08.31.24 @ 10:37) >   1. Left ventricular systolic function is moderately decreased with an ejection fraction visually estimated at 35 to 40 %.   2. Unable to accurately assess segmental wall motion abnormalities due to irregular rhythm.   3. No pericardial effusion seen.    Order Date: 6/26/2024 11:47 AM  Exam: CT ANGIO HEART CORONARY  1.  The calcium score is 0.  2.  No plaque or stenosis within the proximal segment of coronary arteries.  The mid-distal segments as well as side branches are not evaluated secondary to motion and streak artifact.  
HPI:  Pt is a pleasant 37 yo m with Past med hx of HTN , DM, Obesity, PAO and afib since covid in 2024.   Pt states he was admitted and cardioverted to sinus rhythm in 2024.  He has  been on Metoprolol and eliquis daily and had not had another episode. Today morning he woke with palpitations and his smart watch told him his rate was all over the place.  Pt called his cardiologist but then decided to come to [place] [facility] .   He denied cp or sob.    Pt admitted that he drank four ETOH drinks last night including 2 tequila /soda drinks and 2 shots but that usually he does not drink that much.   Pt reports he smoke two - three cigs daily.    Pt denies current cpain/SOB,  no dizziness, no fever/chills,  no abd pain, no n/v/d,   no urinary or resp  complaints.      admitted for afib w/ RVR. also h/o HFrEF EF 40s.    Known h/o afib seen by our service previously.  SERA & CV performed successfully a few months ago.  Seen as OP.  CT coronary angio neg for coronary disease  Afib attributed to obesity BMI 62, pt has seen bariatric surgery.  Currently rate controlled in 90s. pt reports no symptoms now w/ rate control.  Tele afib 70-90.  Overnight 31 bpm bradycardia for 6 sec likely 2/2 PAO.    24: weaned of diltiazem at 7 pm last night rates controlled until this AM went up to 170s, but now in 100s pt asymptomatic.  24: no complaints over night.  At 758a on tele, 7.6 sec pause noted. pt was sleeping. persistent afib 100s.  9/3/24: Awake alert no CP, tele AF rate controlled with a 4.8 second pause  24: no cardiac complaints, SERA?DCCV postoponed for : Afib       MEDICATIONS:  OUTPATIENT  Home Medications:  ascorbic acid 500 mg oral tablet: 1 tab(s) orally once a day (30 Aug 2024 19:35)  cholecalciferol 25 mcg (1000 intl units) oral tablet: 1 tab(s) orally once a day (30 Aug 2024 19:35)  Co-Q10 100 mg oral capsule: 1 cap(s) orally once a day (30 Aug 2024 19:35)  cyanocobalamin 1000 mcg oral tablet: 1 tab(s) orally once a day (30 Aug 2024 19:36)  metFORMIN 500 mg oral tablet: 2 tab(s) orally 2 times a day (30 Aug 2024 19:36)  rosuvastatin 5 mg oral tablet: 1 tab(s) orally once a day (30 Aug 2024 19:26)  spironolactone 25 mg oral tablet: 1 tab(s) orally once a day (30 Aug 2024 19:36)  tiotropium 18 mcg inhalation capsule: 1 cap(s) inhaled once a day (30 Aug 2024 19:36)    MEDICATIONS  (STANDING):  apixaban 5 milliGRAM(s) Oral every 12 hours  ascorbic acid 500 milliGRAM(s) Oral daily  cholecalciferol 1000 Unit(s) Oral daily  cyanocobalamin 1000 MICROGram(s) Oral daily  dextrose 5%. 1000 milliLiter(s) (100 mL/Hr) IV Continuous <Continuous>  dextrose 5%. 1000 milliLiter(s) (50 mL/Hr) IV Continuous <Continuous>  dextrose 50% Injectable 25 Gram(s) IV Push once  dextrose 50% Injectable 12.5 Gram(s) IV Push once  dextrose 50% Injectable 25 Gram(s) IV Push once  glucagon  Injectable 1 milliGRAM(s) IntraMuscular once  insulin lispro (ADMELOG) corrective regimen sliding scale   SubCutaneous at bedtime  insulin lispro (ADMELOG) corrective regimen sliding scale   SubCutaneous three times a day before meals  lactobacillus acidophilus 1 Tablet(s) Oral daily  metoprolol succinate  milliGRAM(s) Oral daily  rosuvastatin 5 milliGRAM(s) Oral at bedtime  sacubitril 24 mG/valsartan 26 mG 1 Tablet(s) Oral two times a day  spironolactone 25 milliGRAM(s) Oral daily  tiotropium 2.5 MICROgram(s) Inhaler 2 Puff(s) Inhalation daily    MEDICATIONS  (PRN):  albuterol    90 MICROgram(s) HFA Inhaler 2 Puff(s) Inhalation every 6 hours PRN for shortness of breath and/or wheezing  metoprolol tartrate Injectable 5 milliGRAM(s) IV Push every 6 hours PRN HR >120  senna 2 Tablet(s) Oral at bedtime PRN Constipation    Vital Signs Last 24 Hrs  T(C): 36.3 (04 Sep 2024 07:39), Max: 36.9 (03 Sep 2024 20:10)  T(F): 97.3 (04 Sep 2024 07:39), Max: 98.4 (03 Sep 2024 20:10)  HR: 69 (04 Sep 2024 10:14) (57 - 100)  BP: 127/106 (04 Sep 2024 07:39) (127/106 - 145/108)  BP(mean): --  RR: 18 (04 Sep 2024 07:39) (18 - 18)  SpO2: 100% (04 Sep 2024 07:39) (98% - 100%)    Parameters below as of 04 Sep 2024 10:14  Patient On (Oxygen Delivery Method): room air      PHYSICAL EXAM:    Constitutional: NAD, awake and alert, obese  HEENT: PERR, EOMI,  No oral cyanosis.  Neck:  supple,  No JVD  Respiratory: Breath sounds are clear bilaterally,  Cardiovascular: S1 and S2, RRR  Gastrointestinal: Abd soft, nontender.   Extremities: No LE Edema  Neurological: A/O x 3, no focal deficits    LABS: reviewed         139  |  112<H>  |  20  ----------------------------<  134<H>  4.1   |  23  |  1.38<H>    Ca    9.2      04 Sep 2024 06:48  Phos  3.4     -  Mg     2.2     -    TPro  6.9  /  Alb  3.3  /  TBili  0.3  /  DBili  x   /  AST  34  /  ALT  67  /  AlkPhos  66  -    - TroponinI hsT: <-56.02     @ 06:12  TSH: 2.98   @ 06:18  TSH: 2.38    Radiology/TTE/EKG: reviewed     < from: TTE Limited W or WO Ultrasound Enhancing Agent (24 @ 10:37) >  _______________________________________________________________________________________     CONCLUSIONS:      1. Left ventricular systolic function is moderately decreased with an ejection fraction visually estimated at 35 to 40 %.   2. Unable to accurately assess segmental wall motion abnormalities due to irregular rhythm.   3. No pericardial effusion seen.    < end of copied text >         ----------------------------------------------------------------    < from: TTE Echo Complete w/o Contrast w/ Doppler (24 @ 18:53) >     Left Atrium   The left atrium is severely dilated.    < end of copied text >  < from: TTE Echo Complete w/o Contrast w/ Doppler (24 @ 18:53) >     Tricuspid Valve   Normal appearing tricuspid valve structure.   Mild tricuspid valve regurgitation.   Mild pulmonary hypertension.    < end of copied text >  < from: TTE Echo Complete w/o Contrast w/ Doppler (24 @ 18:53) >   RVSP:43 mmHg     < end of copied text >      ===============================  CT ANGIO HEART CORONARY IC  - ORDERED BY: JORDAN MCNEIL      PROCEDURE DATE:  2024  Patient: JORGE VALDOVINOS  : 1985  MRN: [id number]  ACC: [id number]  Order Date: 2024 11:47 AM  Exam: CT ANGIO HEART CORONARY    FINDINGS:    CALCIUM SCORE: The calculated Agatston score is 0.    Agatston Score:  Left main: 0.  Left anterior descendin.  Left circumflex: 0.  Right coronary: 0.    CORONARY: Right coronary artery dominance. No evidence for anomalous coronary arteries.  No plaque or stenosis within the proximal segment of coronary arteries. The mid-distal segments as   well as side branches are not evaluated secondary to motion and streak artifact caused by patient's body habitus.    MORPHOLOGY: Normal LV size and shape.  Normal end diastolic left ventricular wall thickness. The left atrium is dilated. No thrombus.  VALVES: The aortic valve is trileaflet. The anterior and posterior leaflets of the mitral valve are normal..  PERICARDIUM: Normal pericardial contour. No pericardial effusion.  AORTA: Normal in diameter.  NONCARDIAC FINDINGS: Within normal limits..      IMPRESSION:    1.  The calcium score is 0.  2.  No plaque or stenosis within the proximal segment of coronary arteries.   The mid-distal segments as well as side branches are not evaluated secondary to motion and streak artifact.    --- End of Report ---    PAUL SELLERS MD; Attending Radiologist  This document has been electronically signed. 2024 11:24AM  ===============================    Jann Suresh M.D.  Cardiology, [facility]  Cell: [phone]  Offices:    ([facility] Office)  [phone] ([facility] Office)

## 2024-09-08 NOTE — PROGRESS NOTE ADULT - REASON FOR ADMISSION
1  BMP and BNP today- Labs  2 2 month follow up  
Palpitations  Afib RVR
Atrial fibrillation, flutter
Palpitations  Afib RVR
Symptomatic atrial fibrillation / flutter
Symptomatic atrial fibrillation, flutter
Symptomatic atrial fibrillation, flutter
Palpitations  Afib RVR

## 2024-09-08 NOTE — PROGRESS NOTE ADULT - ASSESSMENT
Assessment anlan:   · Assessment	  37 yo M with above PMHx presented with symptomatic PAF w/RVR.  Known Nonischemic CMP LVEF 35-40%   - Continue  tikosyn 250mcg po q12hrs Completed 6/6 loading doses here as inpt  ( No Copay for tikosyn)   - Avoid QT prolonging agents. keep K+>4, Mg>2.  - continue  toprol to 100mg daily  - continue interrupted eliquis  - episodes of nocturnal junctional rhythm, pt has PAO on CPAP  - GDMT for chronic HFrEF, will repeat echo as outpt while in SR  -ok to discharge home this am  - will consider AF ablation as out pt  - f/u in EP clinic on 9/13 @10:45AM  for EKG ( appt given to pt)

## 2024-09-08 NOTE — DISCHARGE NOTE PROVIDER - HOSPITAL COURSE
39 yo man with obesity, PAO, DM, HTN, afib since COVID 3/2024 s/p DCCV on Eliquis, awoke 8/30 with palpitations, noticed his HR was highly variable on his Apple Watch, presented for further evaluation. Found to have rapid afib/flutter. Admitted to Medicine.     Paroxysmal atrial fibrillation, flutter  Patient presented in afib/flutter with RVR, treated initially with beta blocker and diltiazem drip. However, patient was noted to have pauses on telemetry, up to 4.8 sec though nocturnally and in setting of his known PAO on CPAP. Stopped diltiazem (especially as LVEF is low as well, see below). Patient admitted to missing multiple PM doses of Eiquis in past, discussed importance of medication compliance. Appreciate Cardiology and Cardiac EP input. S/p SERA/DCCV to NSR 9/5. Patient states that he is feeling better, denies chest pain, dyspnea, palpitations, dizziness or lightheadedness. Loaded with Tikosyn, tolerated that well. QTc < 500. Asymptomatic. Stable for discharge home. Continue Tikosyn. Continue metoprolol succinate, now at 100mg daily. Continue uninterrupted Eliquis. Follow with Dr. Abraham 9/13/24 at 10:45 AM, Dr. Kim / NP Stickelman 9/13/24 at 1:30PM.     Non ischemic cardiomyopathy  TTE with LV systolic dysfunction. Had coronary CT angio as outpatient in 6/2024, normal coronaries and calcium score 0. LV dysfunction possibly due to tachyarrhythmia also could relate to his alcohol use. Appears euvolemic. Appreciate Cardiology input. Entresto 24/26 BID, spironolactone, metoprolol succinate (reduced to 100 daily). Avoid added salt in diet. Limit fluid intake to no more than 1.5 Liter per day. Monitor weight preferably daily. Notify doctor if 3 or more pounds weight gain in 2-3 days as medication regimen may need adjustment and or patient may need expedited follow up with doctor.  Plan to repeat echo as outpatient while in sinus rhythm. Appointment with Dr Maynard and YOSVANY Renee on 9/13.     PAO  On CPAP, tolerates that well. Continue nightly CPAP    Diabetes  A1c 6.8, well controlled.

## 2024-09-08 NOTE — DISCHARGE NOTE PROVIDER - NSDCCPCAREPLAN_GEN_ALL_CORE_FT
PRINCIPAL DISCHARGE DIAGNOSIS  Diagnosis: Rapid atrial fibrillation  Assessment and Plan of Treatment: You were admitted to the hospital for symptomatic atrial fibrillation, flutter. You were seen and evaluated by Cardiology and Cardiac Electrophysiology and you ultimately reviewed a DCCV (cardioversion), which successfully restored you to normal sinus rhythm. You were then started on Tikosyn to help maintain you in normal sinus rhythm. You are now feeling better, tolerated Tikosyn load, stable for discharge home. Continue Tikosyn. Continue metoprolol succinate, now at 100mg daily. Continue uninterrupted Eliquis. Follow with Dr. Abraham 9/13/24 at 10:45 AM, Dr. Kim / NP Stickelman 9/13/24 at 1:30PM.      SECONDARY DISCHARGE DIAGNOSES  Diagnosis: Cardiomyopathy  Assessment and Plan of Treatment: Non ischemic cardiomyopathy (reduced heart squeezign function), as noted on echocardiogram this admission. Reassuringly, you had coronary CT angio as outpatient in 6/2024, normal coronaries and calcium score 0. Left ventricular dysfunction / cardiomyoapthy probably  due to tachyarrhythmia, could relate to alcohol use. No signs of decompensated congestive heart failure. Appreciate Cardiology input. Continue Entresto, spironolactone, metoprolol succinate (reduced to 100mg daily). Avoid added salt in diet. Limit fluid intake to no more than 1.5 Liter per day. Monitor weight preferably daily. Notify doctor if 3 or more pounds weight gain in 2-3 days as medication regimen may need adjustment and or patient may need expedited follow up with doctor.  Plan to repeat echo as outpatient while in sinus rhythm. Appointment with Dr Maynard and YOSVANY Renee on 9/13.

## 2024-09-08 NOTE — PROGRESS NOTE ADULT - PROBLEM SELECTOR PROBLEM 2
Left ventricular systolic dysfunction (LVSD)

## 2024-09-08 NOTE — PROGRESS NOTE ADULT - PROBLEM SELECTOR PLAN 1
S/p successful cardioversion on 9/5 (remains in sinus rhythm); mild QT prolongated on ECG -- continue to monitor with ECGs and telemetry; Tikosyn as per EP.
-h/o afib, presented w/ afib w/ RVR.  on Diltiazem afib in 90s on admit.  weaned off diltiazem 8/31 & HRs 100s-130s.  -TSH normal.  pt reports ETOH use.  -pt admits to missing multiple PM doses of eliquis in past 2-3 weeks.    -Increase metoprolol succ from 150 to 200 daily for tommorrow. Will give metoprolol tartrate 50 mg x 1 this PM.  -Discussed w/ pt treatment options.  Recommend SERA & CV this admit on Tues.  following SERA & CV will start dofetilide.  cannot start prior to SERA given noncompliance w/ eliquis.  -EP consulted to assist w/ initiation of dofetilide postCV on Tues.      -if rate controlled tommorrow, will consider d/c w/ OP SERA & CV & initiation of antiarrhythmic (sotalol or dofetilide).  multaq, 1c agents should be avoided given reduced EF.  -Cont. eliquis for rate control.
S/p successful cardioversion on 9/5 continue anticoagulation;  SERA with severely reduced LVEF, No evidence of left atrial or left atrial appendage thrombus. Tikosyn as per EP.
PW AF RVR (after consuming increased ETOH) maintained on Cardizem GTT and BB, Tele demonstrated AF rate controlled with a 4.8 second nocturnal pause in setting of PAO, TSH NL, recommend stopping CCB ( low EF), CW Beta blocker (recently up titrated) and monitor HR on tele, ( No Dig per EP )  pt admits to missing multiple PM doses of eliquis in past have discussed importance of medication compliance   Will proceed with SERA&CV followed with antiarrhythmic therapy with Tikosyn (cannot start prior to SERA given noncompliance w/ eliquis).  CW oral AC
S/p successful cardioversion this AM; continue anticoagulation; Tikosyn as per EP.
-h/o afib, presented w/ afib w/ RVR.  on Diltiazem afib is now in 90s.  -TSH normal.  pt reports ETOH use.    -recommend titrating of of diltiazem given h/o EF 40s.  metoprolol tartrate 50 mg x 1 given this afternoon, wean diltiazem down to 5 this PM  & off by later today. metoprolol succ changed to 100 starting tommorrow.  -if rate controlled tommorrow, will consider d/c w/ OP SERA & CV & initiation of antiarrhythmic (sotalol or dofetilide).  multaq, 1c agents should be avoided given reduced EF.  -Cont. eliquis for rate control.
PW AF RVR (after consuming increased ETOH) maintained on Cardizem GTT and BB, Tele demonstrated AF rate controlled with a 4.8 second nocturnal pause in setting of PAO, TSH NL, recommend stopping CCB ( low EF), CW Beta blocker (recently up titrated) and monitor HR on tele, ( No Dig per EP )  pt admits to missing multiple PM doses of eliquis in past have discussed importance of medication compliance   plan forTEE&CV on 9/5 -  followed with antiarrhythmic therapy with Tikosyn (cannot start prior to SERA given noncompliance w/ eliquis).  CW oral AC  EP consult appreciated
-h/o afib, presented w/ afib w/ RVR.  on Diltiazem afib in 90s on admit.  weaned off diltiazem 8/31.  HRs in 100s-120s. metoprolol uptitrated   from 50 to 200 today over past 2-3 days.  -TSH normal.  pt reports ETOH use.  -pt admits to missing multiple PM doses of eliquis in past 2-3 weeks.    -Cont. metoprolol succ at 200 daily.  7 sec pause this AM noted- likely 2/2 PAO OK to cont. BB therapy for  rate control.  -Discussed w/ pt treatment options.  Recommend SERA & CV this admit on Tues.  following SERA & CV will start dofetilide 3 day load.  cannot start prior to SERA given noncompliance w/ eliquis.  -EP consulted to assist w/ initiation of dofetilide postCV on Tues.  -cont. eliquis for rate control.
Maintaining sinus rhythm s/p DCCV + Tikosyn; QT interval ok on serial ECGs. Continue apixaban.

## 2024-09-08 NOTE — DISCHARGE NOTE PROVIDER - NSDCCAREPROVSEEN_GEN_ALL_CORE_FT
Bhargav Islas (Cardiology)  Dada Abraham (Cardiac Electrophysiology)  Miki Carreon (Medicine)  Jann Suresh (Cardiology)  Theresa Hurst (Medicine)  Mica Phillips (Cardiac Electrophysiology)  David Hansen (Medicine)  Rasta Cordero (Cardiology)

## 2024-09-08 NOTE — DISCHARGE NOTE PROVIDER - NSDCFUSCHEDAPPT_GEN_ALL_CORE_FT
[facility]  ELECTROPH [address]  Scheduled Appointment: 09/13/2024    Ayah Renee  [facility]  CARDIOLOGY [address]  Scheduled Appointment: 09/13/2024

## 2024-09-08 NOTE — DISCHARGE NOTE PROVIDER - NSDCPNSUBOBJ_GEN_ALL_CORE
Chief Complaint: Palpitations    Interval Hx: Patient seen and examined this AM. S/P DCCV with conversion to NSR. On Tikosyn loading, day # 4 (started AM 9/5/24). QTc <500. Patient feels well, asymptomatic. No palpitations at this point. No chest pain or tightness. No dizziness or lightheadedness. No dyspnea, orthopnea or PND. No abdominal complaints or urinary complaints. Stable for DC home.     ROS: Multi system review is comprehensively negative x 10 systems except as above    Vitals:  T(F): 98.1 (08 Sep 2024 08:00), Max: 98.1 (08 Sep 2024 08:00)  HR: 76 (08 Sep 2024 08:36) (70 - 76)  BP: 107/120 (08 Sep 2024 08:00) (107/120 - 144/82)  RR: 19 (08 Sep 2024 08:00) (19 - 19)  SpO2: 100% (08 Sep 2024 08:00) (98% - 100%) on room air    Exam:  Gen: Comfortable appearing  HEENT: NCAT PERRL EOMI MMM clear oropharynx  Neck: Supple, no JVD, no LAD  CVS: s1 s2 normal, regular, rate ~80  Chest: Normal resp effort, lungs CTA B/L  Abd: +BS, soft NT ND   Ext: No tenderness, intact peripheral pulses, normal cap refill, no clubbing  Skin: Warm, dry  Mood: Calm, pleasant  Neuro: A+OX3, no deficits    Labs:             POCT glucose 100s                 15.9   4.96  )--------( 208                   46.9       142  |  112  |  20  ----------------------<  135  4.1   |   23   |  1.35    Ca  8.9      Phos  3.4     Mg  2.5         TPro  6.9  /  Alb  3.3  /  TBili  0.3  /  DBili  x   /  AST  34  /  ALT  67  /  AlkPhos  66      Troponin 56    LDL 78    A1c 6.8    TSH 2.38    Urinalysis negative    Imaging:  CXR 8/30: Heart magnified by technique. Lungs are clear.    Cardiac Testing/Procedures:  Tele 9/7: SR, rate WNL    EKG 9/6 PM: Normal sinus rhythm. Rate WNL. Possible Left atrial enlargement. Nonspecific T wave abnormality. QTc 472 ms.     Tele 9/6: SR 80-90's bpm, had transient nocturnal junctional rhythm    EKG 9/6: 12AM post 2nd dose of Tikosyn, SR 77bpm, GA 152ms, QRS 100ms QTC 450ms    Tele 9/5: Post DCCV, SR, rate 70s    EKG 9/5: SR 77bpm GA 152ms, QRS 100ms, QTC 450ms    SERA/DCCV 9/5: Left ventricular systolic function is severely decreased. Moderate mitral regurgitation. Mild aortic regurgitation. No evidence of left atrial or left atrial appendage thrombus.    Tele 9/4: Afib/flutter, 80s-110s, occ PVCs    TTE 8/31: Left ventricular systolic function is moderately decreased with an ejection fraction visually estimated at 35 to 40 %. Unable to accurately assess segmental wall motion abnormalities due to irregular rhythm. No pericardial effusion seen.    EKG 8/31: Atrial fibrillation with premature ventricular or aberrantly conducted complexes. Minimal voltage criteria for LVH, may be normal variant ( Tyler product )    EKG 8/30: Atrial fibrillation with rapid ventricular response    Prior visit testing  Coronary CT 6/2024: The calcium score is 0.  No plaque or stenosis within the proximal segment of coronary arteries. The mid-distal segments as well as side branches are not evaluated secondary to motion and streak artifact.    SERA 4/2024:  Reduced left ventricular systolic function. Estimated LVEF is 40%. No evidence of right atrial, left atrial or left atrial appendage thrombus. Moderate mitral regurgitation. Mild tricuspid regurgitation. Mild pulmonic regurgitation.

## 2024-09-12 ENCOUNTER — NON-APPOINTMENT (OUTPATIENT)
Age: 39
End: 2024-09-12

## 2024-09-12 DIAGNOSIS — F10.90 ALCOHOL USE, UNSPECIFIED, UNCOMPLICATED: ICD-10-CM

## 2024-09-12 DIAGNOSIS — F17.210 NICOTINE DEPENDENCE, CIGARETTES, UNCOMPLICATED: ICD-10-CM

## 2024-09-12 DIAGNOSIS — E66.9 OBESITY, UNSPECIFIED: ICD-10-CM

## 2024-09-12 DIAGNOSIS — I48.92 UNSPECIFIED ATRIAL FLUTTER: ICD-10-CM

## 2024-09-12 DIAGNOSIS — E11.9 TYPE 2 DIABETES MELLITUS WITHOUT COMPLICATIONS: ICD-10-CM

## 2024-09-12 DIAGNOSIS — N17.9 ACUTE KIDNEY FAILURE, UNSPECIFIED: ICD-10-CM

## 2024-09-12 DIAGNOSIS — Z79.01 LONG TERM (CURRENT) USE OF ANTICOAGULANTS: ICD-10-CM

## 2024-09-12 DIAGNOSIS — E78.5 HYPERLIPIDEMIA, UNSPECIFIED: ICD-10-CM

## 2024-09-12 DIAGNOSIS — Z79.84 LONG TERM (CURRENT) USE OF ORAL HYPOGLYCEMIC DRUGS: ICD-10-CM

## 2024-09-12 DIAGNOSIS — T45.516A UNDERDOSING OF ANTICOAGULANTS, INITIAL ENCOUNTER: ICD-10-CM

## 2024-09-12 DIAGNOSIS — I48.0 PAROXYSMAL ATRIAL FIBRILLATION: ICD-10-CM

## 2024-09-12 DIAGNOSIS — I08.8 OTHER RHEUMATIC MULTIPLE VALVE DISEASES: ICD-10-CM

## 2024-09-12 DIAGNOSIS — Z88.0 ALLERGY STATUS TO PENICILLIN: ICD-10-CM

## 2024-09-12 DIAGNOSIS — I50.22 CHRONIC SYSTOLIC (CONGESTIVE) HEART FAILURE: ICD-10-CM

## 2024-09-12 DIAGNOSIS — G47.33 OBSTRUCTIVE SLEEP APNEA (ADULT) (PEDIATRIC): ICD-10-CM

## 2024-09-12 DIAGNOSIS — Z79.899 OTHER LONG TERM (CURRENT) DRUG THERAPY: ICD-10-CM

## 2024-09-12 DIAGNOSIS — I42.8 OTHER CARDIOMYOPATHIES: ICD-10-CM

## 2024-09-12 DIAGNOSIS — Z86.16 PERSONAL HISTORY OF COVID-19: ICD-10-CM

## 2024-09-12 DIAGNOSIS — I25.10 ATHEROSCLEROTIC HEART DISEASE OF NATIVE CORONARY ARTERY WITHOUT ANGINA PECTORIS: ICD-10-CM

## 2024-09-12 DIAGNOSIS — Z99.89 DEPENDENCE ON OTHER ENABLING MACHINES AND DEVICES: ICD-10-CM

## 2024-09-12 DIAGNOSIS — Z79.51 LONG TERM (CURRENT) USE OF INHALED STEROIDS: ICD-10-CM

## 2024-09-12 DIAGNOSIS — I11.0 HYPERTENSIVE HEART DISEASE WITH HEART FAILURE: ICD-10-CM

## 2024-09-13 ENCOUNTER — APPOINTMENT (OUTPATIENT)
Dept: CARDIOLOGY | Facility: CLINIC | Age: 39
End: 2024-09-13
Payer: COMMERCIAL

## 2024-09-13 ENCOUNTER — APPOINTMENT (OUTPATIENT)
Dept: ELECTROPHYSIOLOGY | Facility: CLINIC | Age: 39
End: 2024-09-13
Payer: COMMERCIAL

## 2024-09-13 ENCOUNTER — NON-APPOINTMENT (OUTPATIENT)
Age: 39
End: 2024-09-13

## 2024-09-13 VITALS
OXYGEN SATURATION: 96 % | DIASTOLIC BLOOD PRESSURE: 84 MMHG | BODY MASS INDEX: 49.44 KG/M2 | HEART RATE: 65 BPM | WEIGHT: 315 LBS | SYSTOLIC BLOOD PRESSURE: 136 MMHG | HEIGHT: 67 IN

## 2024-09-13 VITALS
DIASTOLIC BLOOD PRESSURE: 98 MMHG | OXYGEN SATURATION: 97 % | SYSTOLIC BLOOD PRESSURE: 140 MMHG | HEART RATE: 57 BPM | HEIGHT: 67 IN | WEIGHT: 315 LBS | BODY MASS INDEX: 49.44 KG/M2

## 2024-09-13 DIAGNOSIS — E78.00 PURE HYPERCHOLESTEROLEMIA, UNSPECIFIED: ICD-10-CM

## 2024-09-13 DIAGNOSIS — I48.91 UNSPECIFIED ATRIAL FIBRILLATION: ICD-10-CM

## 2024-09-13 DIAGNOSIS — E66.01 MORBID (SEVERE) OBESITY DUE TO EXCESS CALORIES: ICD-10-CM

## 2024-09-13 PROCEDURE — 99213 OFFICE O/P EST LOW 20 MIN: CPT

## 2024-09-13 PROCEDURE — 99214 OFFICE O/P EST MOD 30 MIN: CPT

## 2024-09-13 PROCEDURE — 93000 ELECTROCARDIOGRAM COMPLETE: CPT

## 2024-09-13 RX ORDER — METOPROLOL SUCCINATE 100 MG/1
100 TABLET, EXTENDED RELEASE ORAL DAILY
Qty: 90 | Refills: 3 | Status: ACTIVE | COMMUNITY
Start: 2024-09-13

## 2024-09-13 RX ORDER — DOFETILIDE 0.25 MG/1
250 CAPSULE ORAL
Qty: 180 | Refills: 0 | Status: ACTIVE | COMMUNITY
Start: 2024-09-03

## 2024-09-13 RX ORDER — SACUBITRIL AND VALSARTAN 49; 51 MG/1; MG/1
49-51 TABLET, FILM COATED ORAL
Qty: 180 | Refills: 0 | Status: ACTIVE | COMMUNITY
Start: 2024-09-13

## 2024-09-13 RX ORDER — TIRZEPATIDE 2.5 MG/.5ML
2.5 INJECTION, SOLUTION SUBCUTANEOUS
Qty: 1 | Refills: 5 | Status: ACTIVE | COMMUNITY
Start: 2024-09-13 | End: 1900-01-01

## 2024-09-13 NOTE — CARDIOLOGY SUMMARY
[de-identified] : 9/13/24: NATASHA, QTc 428ms [de-identified] :  SERA 9/5/24: LVSF severely decreased, Mod MR, Mild AI, No MARSHALL thrombus  Echo 8/31/24: LVSF moderately decreased, EF 35-40%   Echo 4/1/24  The left ventricle is dilated with moderate, diffuse hypokinesis;  estimated left ventricular ejection fraction is 35-40 %.  Moderate concentric left ventricular hypertrophy.  The left atrium is severely dilated.  The IVC is dilated with decreased respiratory variation.  Moderate mitral regurgitation.  Mild tricuspid valve regurgitation.  Mild pulmonary hypertension.   SERA 4/3/24  Reduced left ventricular systolic function. Estimated LVEF is 40%.  No evidence of right atrial, left atrial or left atrial appendage  thrombus.  Moderate mitral regurgitation.  Mild tricuspid regurgitation.  Mild pulmonic regurgitation.  TTE 5/15/2024 CONCLUSIONS:  1. Technically difficult image quality. 2. Left ventricular endocardium is not well visualized; however, the left ventricular systolic function appears moderately reduced with calculated ejection fraction of 41 %. 3. Moderate left ventricular hypertrophy. 4. Normal right ventricular cavity and normal function. 5. Mild mitral regurgitation.

## 2024-09-13 NOTE — CARDIOLOGY SUMMARY
[de-identified] : 9/13/24: NATASHA, QTc 428ms [de-identified] :  SERA 9/5/24: LVSF severely decreased, Mod MR, Mild AI, No MARSHALL thrombus  Echo 8/31/24: LVSF moderately decreased, EF 35-40%   Echo 4/1/24  The left ventricle is dilated with moderate, diffuse hypokinesis;  estimated left ventricular ejection fraction is 35-40 %.  Moderate concentric left ventricular hypertrophy.  The left atrium is severely dilated.  The IVC is dilated with decreased respiratory variation.  Moderate mitral regurgitation.  Mild tricuspid valve regurgitation.  Mild pulmonary hypertension.   SERA 4/3/24  Reduced left ventricular systolic function. Estimated LVEF is 40%.  No evidence of right atrial, left atrial or left atrial appendage  thrombus.  Moderate mitral regurgitation.  Mild tricuspid regurgitation.  Mild pulmonic regurgitation.  TTE 5/15/2024 CONCLUSIONS:  1. Technically difficult image quality. 2. Left ventricular endocardium is not well visualized; however, the left ventricular systolic function appears moderately reduced with calculated ejection fraction of 41 %. 3. Moderate left ventricular hypertrophy. 4. Normal right ventricular cavity and normal function. 5. Mild mitral regurgitation.

## 2024-09-13 NOTE — CARDIOLOGY SUMMARY
[de-identified] : 9/13/24: NATASHA, QTc 428ms [de-identified] :  SERA 9/5/24: LVSF severely decreased, Mod MR, Mild AI, No MARSHALL thrombus  Echo 8/31/24: LVSF moderately decreased, EF 35-40%   Echo 4/1/24  The left ventricle is dilated with moderate, diffuse hypokinesis;  estimated left ventricular ejection fraction is 35-40 %.  Moderate concentric left ventricular hypertrophy.  The left atrium is severely dilated.  The IVC is dilated with decreased respiratory variation.  Moderate mitral regurgitation.  Mild tricuspid valve regurgitation.  Mild pulmonary hypertension.   SERA 4/3/24  Reduced left ventricular systolic function. Estimated LVEF is 40%.  No evidence of right atrial, left atrial or left atrial appendage  thrombus.  Moderate mitral regurgitation.  Mild tricuspid regurgitation.  Mild pulmonic regurgitation.  TTE 5/15/2024 CONCLUSIONS:  1. Technically difficult image quality. 2. Left ventricular endocardium is not well visualized; however, the left ventricular systolic function appears moderately reduced with calculated ejection fraction of 41 %. 3. Moderate left ventricular hypertrophy. 4. Normal right ventricular cavity and normal function. 5. Mild mitral regurgitation.

## 2024-09-13 NOTE — HISTORY OF PRESENT ILLNESS
[FreeTextEntry1] : 37 yo male with a hx paroxysmal atrial fibrillation (initially diagnosed in setting of COVID infection), echo at time of hospitalization showed EF 35-40%,  S/P SERA/DCCV, HFrEF (LVEF on repeat echo one month later persistently reduced 40%,  HTN, asthma, sleep apnea (non-adherent w/ CPAP), dyslipidemia, DM here today for hospital follow up.    He presented to ED 8/30/24 with palpitations.  Noticed his HR was high on his Apple Watch and presented to ED.  Found to have afib/flutter.  Echo 8/31/24 LVSF moderately decreased with EF 35-40%.  Pt s/p SERA/DCCV 9/5/24 and Tikosyn initiated.  He presents today with his mom.  He is feeling good and offers no complaints of cp, SOB, palpitations.  He saw EP earlier and is in sinus rhythm.  He denies orthopnea, PND or LE edema.  He is now walking 2 miles a day and notes he is compliant with CPAP.

## 2024-09-13 NOTE — ASSESSMENT
[FreeTextEntry1] : Paroxysmal Afib: Initial episode dx in setting COVID s/p SERA/DCCV.  Pt recently presented to ED with palpitations, noted to be in Afib/flutter with RVR.  Echo shows persistent LVSD.  Pt s/p SERA/DCCV 9/5/24 and Tikosyn initiated.  EKG today shows SB, QTc 428ms.  Pt will continue eliquis,  metoprolol and Tikosyn.  Pt will follow up with EP. Pt encouraged to be compliant with CPAP.  Maintain K>4, Mg>2    HFrEF: LVEF initially 35-40% remained reduced on repeat TTE despite remaining in NSR.  Repeat echo 8/2024 shows EF 35-40%.  Pt s/p SERA/DCCV and is in normal rhythm today.  Recommend GDMT.  CW metoprolol succinate.  Recommend increasing entresto from 24/26mg BID to 49/51mg BID.  CW spironolactone.  Will check BMP in 3 weeks.  Continue low sodium diet, fluid restriction 1.5 L/day.  Daily weights, contact office for weight gain 2 pounds or more overnight.  Would repeat echo to reeval LVF after 3 months GDMT   PAO  (non-adherent with CPAP): Pt now compliant, pt encouraged to continue to use CPAP on regular basis.  Obesity: Pts mother asked about injectable medication.  I have sent rx for Zepbound.  Pt will follow up one month

## 2024-09-13 NOTE — REVIEW OF SYSTEMS
[Negative] : Heme/Lymph [Weight Gain (___ Lbs)] : [unfilled] ~Ulb weight gain [Feeling Fatigued] : feeling fatigued [SOB] : no shortness of breath [Dyspnea on exertion] : not dyspnea during exertion [Chest Discomfort] : no chest discomfort [Lower Ext Edema] : no extremity edema [Leg Claudication] : no intermittent leg claudication [Palpitations] : no palpitations [Orthopnea] : no orthopnea [PND] : no PND [Syncope] : no syncope

## 2024-09-13 NOTE — REASON FOR VISIT
[Other: ____] : [unfilled] [Cardiac Failure] : cardiac failure [Arrhythmia/ECG Abnorrmalities] : arrhythmia/ECG abnormalities [Hypertension] : hypertension

## 2024-09-13 NOTE — PHYSICAL EXAM
[Obese] : obese [Normal S1, S2] : normal S1, S2 [Soft] : abdomen soft [Non Tender] : non-tender [Normal Gait] : normal gait [No Edema] : no edema [Normal] : moves all extremities, no focal deficits, normal speech [Alert and Oriented] : alert and oriented [Well Developed] : well developed [Well Nourished] : well nourished [No Acute Distress] : no acute distress [Normal Venous Pressure] : normal venous pressure [Clear Lung Fields] : clear lung fields [No Rash] : no rash [Moves all extremities] : moves all extremities [Normal Conjunctiva] : normal conjunctiva [No Carotid Bruit] : no carotid bruit [No Murmur] : no murmur

## 2024-09-13 NOTE — HISTORY OF PRESENT ILLNESS
[FreeTextEntry1] : 39 yo male with a hx paroxysmal atrial fibrillation (initially diagnosed in setting of COVID infection), echo at time of hospitalization showed EF 35-40%,  S/P SERA/DCCV, HFrEF (LVEF on repeat echo one month later persistently reduced 40%,  HTN, asthma, sleep apnea (non-adherent w/ CPAP), dyslipidemia, DM here today for hospital follow up.    He presented to ED 8/30/24 with palpitations.  Noticed his HR was high on his Apple Watch and presented to ED.  Found to have afib/flutter.  Echo 8/31/24 LVSF moderately decreased with EF 35-40%.  Pt s/p SERA/DCCV 9/5/24 and Tikosyn initiated.  He presents today with his mom.  He is feeling good and offers no complaints of cp, SOB, palpitations.  He saw EP earlier and is in sinus rhythm.  He denies orthopnea, PND or LE edema.  He is now walking 2 miles a day and notes he is compliant with CPAP.

## 2024-09-23 NOTE — DISCHARGE NOTE NURSING/CASE MANAGEMENT/SOCIAL WORK - NSDCPEPTCAREGIVEDUMATLIST _GEN_ALL_CORE
WOUND CARE NOTE      REASON FOR VISIT: No chief complaint on file.       HISTORY:  History reviewed. No pertinent past medical history.   History reviewed. No pertinent surgical history.       ASSESSMENT & TREATMENT:     Wound Description  Consulted to see pt for skin breakdown. Pain assessed for pain medication/intervention prior to assessment. Pain medication not indicated.   Left lateral ankle noted with pale scar.  Right lateral ankle noted with stage 2 pressure injury, 0.8X1.0X0.2cm, 100% pink tissue, scant serosanguinous drainage, no odor noted.  No periwound edema, erythema, induration or warmth at this time of assessment.   Preventative measures in place including pressure reducing surface.       Recommendation:   BRUNO surface along with aggressive offloading as tolerated by patient's medical condition. Preventative skin care.  Rec. Cleanse wound, apply foam dressing to right lateral ankle wound, change Q 3 days and as needed. Pt and brother updated on wound assessment and questions answered. RN updated and MD on page for recommendations.      Deepak:    Nutrition:    Dietary Orders (From admission, onward)       Start     Ordered    09/19/24 0837  2 Times/Day Between Meals (AM/PM); Bananatrol Plus/Banana Flakes (please give 1 pack at 9am and i pack at 9pm with meds) Oral Nutrition Supplement  As Directed        Question Answer Comment   Frequency 2 Times/Day Between Meals (AM/PM)    Oral Supplement Bananatrol Plus/Banana Flakes please give 1 pack at 9am and i pack at 9pm with meds       09/19/24 0836    09/18/24 1250  Tube Feeding Diet Vital AF 1.2 Marcello/Peptide Based 1.2 Calorie Formula; Without Diet Tray; Water; Every 6 hours; 100  DIET EFFECTIVE NOW        Question Answer Comment   Tube Feeding Products Vital AF 1.2 Marcello/Peptide Based 1.2 Calorie Formula    Administer enteral feeding. Choose schedule Continuous    Method By pump    Initiate enteral feeding at (mL/hr) 20    Increase feeding by (mL/hr) as  tolerated until goal is reached 20 mL/hr every 8 hours    Advance to goal rate (mL/hr) 80    Tube Feeding Relationship Without Diet Tray    Flush with Water    Flush frequency Every 6 hours    Flush volume (mL) 100        09/18/24 1251                    Labs:  No results for input(s): \"WBC\", \"GLUCOSE\" in the last 72 hours.    Invalid input(s): \"CREAT\"  Allergies:   ALLERGIES:  No Known Allergies  Meds:  Current Facility-Administered Medications   Medication    oxyCODONE (IMM REL) (ROXICODONE) 5 MG/5ML solution 10 mg    acetaminophen (TYLENOL) 160 MG/5ML solution 1,000 mg    aspirin chewable 81 mg    propRANolol (INDERAL) tablet 10 mg    docusate sodium (COLACE) 50 MG/5ML liquid 100 mg    ceFEPIme (MAXIPIME) 2 g in sodium chloride 0.9 % 100 mL IVPB    fentaNYL (SUBLIMAZE) injection 50 mcg    valproic acid (DEPAKENE) solution 1,000 mg    dextrose (GLUTOSE) 40 % gel 15 g    dextrose (GLUTOSE) 40 % gel 30 g    QUEtiapine (SEROquel) tablet 25 mg    cloNIDine (CATAPRES) tablet 0.3 mg    oxyCODONE (IMM REL) (ROXICODONE) 5 MG/5ML solution 5 mg    Or    oxyCODONE (IMM REL) (ROXICODONE) 5 MG/5ML solution 7.5 mg    bromocriptine (PARLODEL) tablet 10 mg    dextrose 50 % injection 25 g    dextrose 50 % injection 12.5 g    glucagon (GLUCAGEN) injection 1 mg    enoxaparin (LOVENOX) injection 30 mg    sodium chloride 0.9 % injection 2 mL                     Heart Failure/Coronavirus/COVID19 Heart Failure/Diabetes/Apixaban/Eliquis/Coronavirus/COVID19

## 2024-10-14 ENCOUNTER — APPOINTMENT (OUTPATIENT)
Dept: CARDIOLOGY | Facility: CLINIC | Age: 39
End: 2024-10-14

## 2024-10-21 ENCOUNTER — APPOINTMENT (OUTPATIENT)
Dept: CARDIOLOGY | Facility: CLINIC | Age: 39
End: 2024-10-21
Payer: COMMERCIAL

## 2024-10-21 VITALS
WEIGHT: 315 LBS | BODY MASS INDEX: 49.44 KG/M2 | HEIGHT: 67 IN | OXYGEN SATURATION: 97 % | DIASTOLIC BLOOD PRESSURE: 86 MMHG | SYSTOLIC BLOOD PRESSURE: 136 MMHG

## 2024-10-21 DIAGNOSIS — I48.91 UNSPECIFIED ATRIAL FIBRILLATION: ICD-10-CM

## 2024-10-21 DIAGNOSIS — E66.01 MORBID (SEVERE) OBESITY DUE TO EXCESS CALORIES: ICD-10-CM

## 2024-10-21 DIAGNOSIS — I51.9 HEART DISEASE, UNSPECIFIED: ICD-10-CM

## 2024-10-21 PROCEDURE — 99214 OFFICE O/P EST MOD 30 MIN: CPT

## 2024-10-21 PROCEDURE — G2211 COMPLEX E/M VISIT ADD ON: CPT | Mod: NC

## 2024-10-21 RX ORDER — FOLIC ACID 1 MG/1
1 TABLET ORAL DAILY
Refills: 0 | Status: ACTIVE | COMMUNITY

## 2024-10-21 RX ORDER — DOXYCYCLINE HYCLATE 100 MG/1
100 TABLET ORAL DAILY
Refills: 0 | Status: ACTIVE | COMMUNITY

## 2024-10-21 RX ORDER — AMLODIPINE BESYLATE 2.5 MG/1
2.5 TABLET ORAL DAILY
Refills: 0 | Status: ACTIVE | COMMUNITY

## 2024-12-13 ENCOUNTER — APPOINTMENT (OUTPATIENT)
Dept: ELECTROPHYSIOLOGY | Facility: CLINIC | Age: 39
End: 2024-12-13

## 2024-12-16 ENCOUNTER — APPOINTMENT (OUTPATIENT)
Dept: CARDIOLOGY | Facility: CLINIC | Age: 39
End: 2024-12-16

## 2024-12-16 NOTE — ED STATDOCS - CPE ED SKIN NORM
Last appointment: 03/11/2024 MD Luo   Next appointment: 03/31/2025 MD Luo   Previous refill encounter(s):   04/06/2024 Synthroid #90 with 2 refills.     For Pharmacy Admin Tracking Only    Program: Medication Refill  Intervention Detail: New Rx: 1, reason: Patient Preference  Time Spent (min): 5    Requested Prescriptions     Pending Prescriptions Disp Refills    levothyroxine (SYNTHROID) 25 MCG tablet 90 tablet 0     Sig: Take 1 tablet by mouth every morning (before breakfast)       
normal...

## 2025-01-13 ENCOUNTER — APPOINTMENT (OUTPATIENT)
Dept: CARDIOLOGY | Facility: CLINIC | Age: 40
End: 2025-01-13

## 2025-01-13 VITALS
OXYGEN SATURATION: 99 % | HEIGHT: 67 IN | WEIGHT: 315 LBS | DIASTOLIC BLOOD PRESSURE: 86 MMHG | HEART RATE: 76 BPM | BODY MASS INDEX: 49.44 KG/M2 | SYSTOLIC BLOOD PRESSURE: 134 MMHG

## 2025-01-13 PROCEDURE — 93306 TTE W/DOPPLER COMPLETE: CPT

## 2025-01-13 PROCEDURE — G2211 COMPLEX E/M VISIT ADD ON: CPT | Mod: NC

## 2025-01-13 PROCEDURE — 93010 ELECTROCARDIOGRAM REPORT: CPT

## 2025-01-13 PROCEDURE — 99214 OFFICE O/P EST MOD 30 MIN: CPT

## 2025-01-13 RX ORDER — SACUBITRIL AND VALSARTAN 97; 103 MG/1; MG/1
97-103 TABLET, FILM COATED ORAL TWICE DAILY
Qty: 120 | Refills: 0 | Status: ACTIVE | COMMUNITY
Start: 2025-01-13 | End: 1900-01-01

## 2025-01-27 ENCOUNTER — APPOINTMENT (OUTPATIENT)
Dept: CARDIOLOGY | Facility: CLINIC | Age: 40
End: 2025-01-27

## 2025-03-19 ENCOUNTER — INPATIENT (INPATIENT)
Facility: HOSPITAL | Age: 40
LOS: 1 days | Discharge: HOME | DRG: 310 | End: 2025-03-21
Attending: HOSPITALIST | Admitting: INTERNAL MEDICINE
Payer: COMMERCIAL

## 2025-03-19 VITALS
SYSTOLIC BLOOD PRESSURE: 113 MMHG | RESPIRATION RATE: 18 BRPM | OXYGEN SATURATION: 100 % | DIASTOLIC BLOOD PRESSURE: 80 MMHG | HEART RATE: 78 BPM

## 2025-03-19 DIAGNOSIS — I48.0 PAROXYSMAL ATRIAL FIBRILLATION: ICD-10-CM

## 2025-03-19 DIAGNOSIS — Z88.0 ALLERGY STATUS TO PENICILLIN: ICD-10-CM

## 2025-03-19 DIAGNOSIS — Z79.01 LONG TERM (CURRENT) USE OF ANTICOAGULANTS: ICD-10-CM

## 2025-03-19 DIAGNOSIS — L03.032 CELLULITIS OF LEFT TOE: ICD-10-CM

## 2025-03-19 DIAGNOSIS — E78.5 HYPERLIPIDEMIA, UNSPECIFIED: ICD-10-CM

## 2025-03-19 DIAGNOSIS — L97.529 NON-PRESSURE CHRONIC ULCER OF OTHER PART OF LEFT FOOT WITH UNSPECIFIED SEVERITY: ICD-10-CM

## 2025-03-19 DIAGNOSIS — E66.01 MORBID (SEVERE) OBESITY DUE TO EXCESS CALORIES: ICD-10-CM

## 2025-03-19 DIAGNOSIS — Z79.84 LONG TERM (CURRENT) USE OF ORAL HYPOGLYCEMIC DRUGS: ICD-10-CM

## 2025-03-19 DIAGNOSIS — L60.0 INGROWING NAIL: ICD-10-CM

## 2025-03-19 DIAGNOSIS — E11.9 TYPE 2 DIABETES MELLITUS WITHOUT COMPLICATIONS: ICD-10-CM

## 2025-03-19 DIAGNOSIS — I10 ESSENTIAL (PRIMARY) HYPERTENSION: ICD-10-CM

## 2025-03-19 DIAGNOSIS — G47.33 OBSTRUCTIVE SLEEP APNEA (ADULT) (PEDIATRIC): ICD-10-CM

## 2025-03-19 DIAGNOSIS — Z11.52 ENCOUNTER FOR SCREENING FOR COVID-19: ICD-10-CM

## 2025-03-19 LAB
ALBUMIN SERPL ELPH-MCNC: 3.3 G/DL — SIGNIFICANT CHANGE UP (ref 3.3–5)
ALP SERPL-CCNC: 57 U/L — SIGNIFICANT CHANGE UP (ref 40–120)
ALT FLD-CCNC: 32 U/L — SIGNIFICANT CHANGE UP (ref 12–78)
ANION GAP SERPL CALC-SCNC: 4 MMOL/L — LOW (ref 5–17)
APPEARANCE UR: CLEAR — SIGNIFICANT CHANGE UP
APTT BLD: 44.1 SEC — HIGH (ref 24.5–35.6)
AST SERPL-CCNC: 21 U/L — SIGNIFICANT CHANGE UP (ref 15–37)
BACTERIA # UR AUTO: NEGATIVE /HPF — SIGNIFICANT CHANGE UP
BASOPHILS # BLD AUTO: 0.03 K/UL — SIGNIFICANT CHANGE UP (ref 0–0.2)
BASOPHILS NFR BLD AUTO: 0.5 % — SIGNIFICANT CHANGE UP (ref 0–2)
BILIRUB SERPL-MCNC: 0.5 MG/DL — SIGNIFICANT CHANGE UP (ref 0.2–1.2)
BILIRUB UR-MCNC: NEGATIVE — SIGNIFICANT CHANGE UP
BUN SERPL-MCNC: 24 MG/DL — HIGH (ref 7–23)
CALCIUM SERPL-MCNC: 8.9 MG/DL — SIGNIFICANT CHANGE UP (ref 8.5–10.1)
CAST: 2 /LPF — SIGNIFICANT CHANGE UP (ref 0–4)
CHLORIDE SERPL-SCNC: 109 MMOL/L — HIGH (ref 96–108)
CO2 SERPL-SCNC: 22 MMOL/L — SIGNIFICANT CHANGE UP (ref 22–31)
COLOR SPEC: YELLOW — SIGNIFICANT CHANGE UP
CREAT SERPL-MCNC: 1.57 MG/DL — HIGH (ref 0.5–1.3)
DIFF PNL FLD: NEGATIVE — SIGNIFICANT CHANGE UP
EGFR: 58 ML/MIN/1.73M2 — LOW
EGFR: 58 ML/MIN/1.73M2 — LOW
EOSINOPHIL # BLD AUTO: 0.17 K/UL — SIGNIFICANT CHANGE UP (ref 0–0.5)
EOSINOPHIL NFR BLD AUTO: 3 % — SIGNIFICANT CHANGE UP (ref 0–6)
FLUAV AG NPH QL: SIGNIFICANT CHANGE UP
FLUBV AG NPH QL: SIGNIFICANT CHANGE UP
GLUCOSE BLDC GLUCOMTR-MCNC: 169 MG/DL — HIGH (ref 70–99)
GLUCOSE SERPL-MCNC: 160 MG/DL — HIGH (ref 70–99)
GLUCOSE UR QL: NEGATIVE MG/DL — SIGNIFICANT CHANGE UP
HCT VFR BLD CALC: 49.5 % — SIGNIFICANT CHANGE UP (ref 39–50)
HGB BLD-MCNC: 16.7 G/DL — SIGNIFICANT CHANGE UP (ref 13–17)
IMM GRANULOCYTES # BLD AUTO: 0.04 K/UL — SIGNIFICANT CHANGE UP (ref 0–0.07)
IMM GRANULOCYTES NFR BLD AUTO: 0.7 % — SIGNIFICANT CHANGE UP (ref 0–0.9)
INR BLD: 1.25 RATIO — HIGH (ref 0.85–1.16)
KETONES UR-MCNC: NEGATIVE MG/DL — SIGNIFICANT CHANGE UP
LACTATE SERPL-SCNC: 1.1 MMOL/L — SIGNIFICANT CHANGE UP (ref 0.7–2)
LEUKOCYTE ESTERASE UR-ACNC: NEGATIVE — SIGNIFICANT CHANGE UP
LIDOCAIN IGE QN: 19 U/L — SIGNIFICANT CHANGE UP (ref 13–75)
LYMPHOCYTES # BLD AUTO: 1.48 K/UL — SIGNIFICANT CHANGE UP (ref 1–3.3)
LYMPHOCYTES NFR BLD AUTO: 25.7 % — SIGNIFICANT CHANGE UP (ref 13–44)
MAGNESIUM SERPL-MCNC: 1.9 MG/DL — SIGNIFICANT CHANGE UP (ref 1.6–2.6)
MCHC RBC-ENTMCNC: 31 PG — SIGNIFICANT CHANGE UP (ref 27–34)
MCHC RBC-ENTMCNC: 33.7 G/DL — SIGNIFICANT CHANGE UP (ref 32–36)
MCV RBC AUTO: 91.8 FL — SIGNIFICANT CHANGE UP (ref 80–100)
MONOCYTES # BLD AUTO: 0.83 K/UL — SIGNIFICANT CHANGE UP (ref 0–0.9)
MONOCYTES NFR BLD AUTO: 14.4 % — HIGH (ref 2–14)
NEUTROPHILS # BLD AUTO: 3.21 K/UL — SIGNIFICANT CHANGE UP (ref 1.8–7.4)
NEUTROPHILS NFR BLD AUTO: 55.7 % — SIGNIFICANT CHANGE UP (ref 43–77)
NITRITE UR-MCNC: NEGATIVE — SIGNIFICANT CHANGE UP
NRBC # BLD AUTO: 0 K/UL — SIGNIFICANT CHANGE UP (ref 0–0)
NRBC # FLD: 0 K/UL — SIGNIFICANT CHANGE UP (ref 0–0)
NRBC BLD AUTO-RTO: 0 /100 WBCS — SIGNIFICANT CHANGE UP (ref 0–0)
PH UR: 5.5 — SIGNIFICANT CHANGE UP (ref 5–8)
PMV BLD: 11.3 FL — SIGNIFICANT CHANGE UP (ref 7–13)
POTASSIUM SERPL-MCNC: 4.5 MMOL/L — SIGNIFICANT CHANGE UP (ref 3.5–5.3)
POTASSIUM SERPL-SCNC: 4.5 MMOL/L — SIGNIFICANT CHANGE UP (ref 3.5–5.3)
PROT SERPL-MCNC: 7.4 GM/DL — SIGNIFICANT CHANGE UP (ref 6–8.3)
PROT UR-MCNC: 30 MG/DL
PROTHROM AB SERPL-ACNC: 14.4 SEC — HIGH (ref 9.9–13.4)
RBC # BLD: 5.39 M/UL — SIGNIFICANT CHANGE UP (ref 4.2–5.8)
RBC CASTS # UR COMP ASSIST: 1 /HPF — SIGNIFICANT CHANGE UP (ref 0–4)
RSV RNA NPH QL NAA+NON-PROBE: SIGNIFICANT CHANGE UP
SARS-COV-2 RNA SPEC QL NAA+PROBE: SIGNIFICANT CHANGE UP
SODIUM SERPL-SCNC: 135 MMOL/L — SIGNIFICANT CHANGE UP (ref 135–145)
SOURCE RESPIRATORY: SIGNIFICANT CHANGE UP
SP GR SPEC: 1.02 — SIGNIFICANT CHANGE UP (ref 1–1.03)
SQUAMOUS # UR AUTO: 1 /HPF — SIGNIFICANT CHANGE UP (ref 0–5)
UROBILINOGEN FLD QL: 0.2 MG/DL — SIGNIFICANT CHANGE UP (ref 0.2–1)
WBC # BLD: 5.76 K/UL — SIGNIFICANT CHANGE UP (ref 3.8–10.5)
WBC # FLD AUTO: 5.76 K/UL — SIGNIFICANT CHANGE UP (ref 3.8–10.5)

## 2025-03-19 PROCEDURE — 92960 CARDIOVERSION ELECTRIC EXT: CPT

## 2025-03-19 PROCEDURE — 94660 CPAP INITIATION&MGMT: CPT

## 2025-03-19 PROCEDURE — 84443 ASSAY THYROID STIM HORMONE: CPT

## 2025-03-19 PROCEDURE — 80048 BASIC METABOLIC PNL TOTAL CA: CPT

## 2025-03-19 PROCEDURE — 84100 ASSAY OF PHOSPHORUS: CPT

## 2025-03-19 PROCEDURE — 93308 TTE F-UP OR LMTD: CPT

## 2025-03-19 PROCEDURE — 36415 COLL VENOUS BLD VENIPUNCTURE: CPT

## 2025-03-19 PROCEDURE — 93005 ELECTROCARDIOGRAM TRACING: CPT

## 2025-03-19 PROCEDURE — 83735 ASSAY OF MAGNESIUM: CPT

## 2025-03-19 PROCEDURE — 71045 X-RAY EXAM CHEST 1 VIEW: CPT | Mod: 26

## 2025-03-19 PROCEDURE — 99223 1ST HOSP IP/OBS HIGH 75: CPT

## 2025-03-19 PROCEDURE — 85025 COMPLETE CBC W/AUTO DIFF WBC: CPT

## 2025-03-19 PROCEDURE — 99291 CRITICAL CARE FIRST HOUR: CPT

## 2025-03-19 PROCEDURE — 94640 AIRWAY INHALATION TREATMENT: CPT

## 2025-03-19 PROCEDURE — 93312 ECHO TRANSESOPHAGEAL: CPT

## 2025-03-19 PROCEDURE — 82962 GLUCOSE BLOOD TEST: CPT

## 2025-03-19 PROCEDURE — 80053 COMPREHEN METABOLIC PANEL: CPT

## 2025-03-19 PROCEDURE — 93325 DOPPLER ECHO COLOR FLOW MAPG: CPT

## 2025-03-19 PROCEDURE — 73660 X-RAY EXAM OF TOE(S): CPT | Mod: 26,LT

## 2025-03-19 PROCEDURE — 93320 DOPPLER ECHO COMPLETE: CPT

## 2025-03-19 PROCEDURE — 83036 HEMOGLOBIN GLYCOSYLATED A1C: CPT

## 2025-03-19 PROCEDURE — 99221 1ST HOSP IP/OBS SF/LOW 40: CPT

## 2025-03-19 RX ORDER — MAGNESIUM, ALUMINUM HYDROXIDE 200-200 MG
30 TABLET,CHEWABLE ORAL EVERY 4 HOURS
Refills: 0 | Status: DISCONTINUED | OUTPATIENT
Start: 2025-03-19 | End: 2025-03-21

## 2025-03-19 RX ORDER — DILTIAZEM HYDROCHLORIDE 240 MG/1
5 TABLET, EXTENDED RELEASE ORAL
Qty: 125 | Refills: 0 | Status: DISCONTINUED | OUTPATIENT
Start: 2025-03-19 | End: 2025-03-20

## 2025-03-19 RX ORDER — DEXTROSE 50 % IN WATER 50 %
25 SYRINGE (ML) INTRAVENOUS ONCE
Refills: 0 | Status: DISCONTINUED | OUTPATIENT
Start: 2025-03-19 | End: 2025-03-21

## 2025-03-19 RX ORDER — SODIUM CHLORIDE 9 G/1000ML
1000 INJECTION, SOLUTION INTRAVENOUS
Refills: 0 | Status: DISCONTINUED | OUTPATIENT
Start: 2025-03-19 | End: 2025-03-21

## 2025-03-19 RX ORDER — METOPROLOL SUCCINATE 50 MG/1
5 TABLET, EXTENDED RELEASE ORAL ONCE
Refills: 0 | Status: COMPLETED | OUTPATIENT
Start: 2025-03-19 | End: 2025-03-19

## 2025-03-19 RX ORDER — DILTIAZEM HYDROCHLORIDE 240 MG/1
20 TABLET, EXTENDED RELEASE ORAL ONCE
Refills: 0 | Status: COMPLETED | OUTPATIENT
Start: 2025-03-19 | End: 2025-03-19

## 2025-03-19 RX ORDER — METOPROLOL SUCCINATE 50 MG/1
100 TABLET, EXTENDED RELEASE ORAL DAILY
Refills: 0 | Status: DISCONTINUED | OUTPATIENT
Start: 2025-03-19 | End: 2025-03-21

## 2025-03-19 RX ORDER — ALBUTEROL SULFATE 2.5 MG/3ML
2 VIAL, NEBULIZER (ML) INHALATION EVERY 6 HOURS
Refills: 0 | Status: DISCONTINUED | OUTPATIENT
Start: 2025-03-19 | End: 2025-03-21

## 2025-03-19 RX ORDER — SPIRONOLACTONE 25 MG
25 TABLET ORAL DAILY
Refills: 0 | Status: DISCONTINUED | OUTPATIENT
Start: 2025-03-19 | End: 2025-03-21

## 2025-03-19 RX ORDER — VANCOMYCIN HCL IN 5 % DEXTROSE 1.5G/250ML
2000 PLASTIC BAG, INJECTION (ML) INTRAVENOUS ONCE
Refills: 0 | Status: COMPLETED | OUTPATIENT
Start: 2025-03-19 | End: 2025-03-19

## 2025-03-19 RX ORDER — SACUBITRIL AND VALSARTAN 49; 51 MG/1; MG/1
1 TABLET, FILM COATED ORAL
Refills: 0 | DISCHARGE

## 2025-03-19 RX ORDER — ACETAMINOPHEN 500 MG/5ML
650 LIQUID (ML) ORAL ONCE
Refills: 0 | Status: DISCONTINUED | OUTPATIENT
Start: 2025-03-19 | End: 2025-03-21

## 2025-03-19 RX ORDER — DOFETILIDE 0.5 MG/1
250 CAPSULE ORAL EVERY 12 HOURS
Refills: 0 | Status: DISCONTINUED | OUTPATIENT
Start: 2025-03-19 | End: 2025-03-21

## 2025-03-19 RX ORDER — CEFTRIAXONE 500 MG/1
1000 INJECTION, POWDER, FOR SOLUTION INTRAMUSCULAR; INTRAVENOUS EVERY 24 HOURS
Refills: 0 | Status: DISCONTINUED | OUTPATIENT
Start: 2025-03-20 | End: 2025-03-20

## 2025-03-19 RX ORDER — MELATONIN 5 MG
3 TABLET ORAL AT BEDTIME
Refills: 0 | Status: DISCONTINUED | OUTPATIENT
Start: 2025-03-19 | End: 2025-03-21

## 2025-03-19 RX ORDER — DEXTROSE 50 % IN WATER 50 %
12.5 SYRINGE (ML) INTRAVENOUS ONCE
Refills: 0 | Status: DISCONTINUED | OUTPATIENT
Start: 2025-03-19 | End: 2025-03-21

## 2025-03-19 RX ORDER — GLUCAGON 3 MG/1
1 POWDER NASAL ONCE
Refills: 0 | Status: DISCONTINUED | OUTPATIENT
Start: 2025-03-19 | End: 2025-03-21

## 2025-03-19 RX ORDER — ONDANSETRON HCL/PF 4 MG/2 ML
4 VIAL (ML) INJECTION EVERY 8 HOURS
Refills: 0 | Status: DISCONTINUED | OUTPATIENT
Start: 2025-03-19 | End: 2025-03-20

## 2025-03-19 RX ORDER — DEXTROSE 50 % IN WATER 50 %
15 SYRINGE (ML) INTRAVENOUS ONCE
Refills: 0 | Status: DISCONTINUED | OUTPATIENT
Start: 2025-03-19 | End: 2025-03-21

## 2025-03-19 RX ORDER — VANCOMYCIN HCL IN 5 % DEXTROSE 1.5G/250ML
1000 PLASTIC BAG, INJECTION (ML) INTRAVENOUS ONCE
Refills: 0 | Status: DISCONTINUED | OUTPATIENT
Start: 2025-03-19 | End: 2025-03-19

## 2025-03-19 RX ORDER — CEFTRIAXONE 500 MG/1
1000 INJECTION, POWDER, FOR SOLUTION INTRAMUSCULAR; INTRAVENOUS ONCE
Refills: 0 | Status: COMPLETED | OUTPATIENT
Start: 2025-03-19 | End: 2025-03-19

## 2025-03-19 RX ORDER — APIXABAN 2.5 MG/1
5 TABLET, FILM COATED ORAL EVERY 12 HOURS
Refills: 0 | Status: DISCONTINUED | OUTPATIENT
Start: 2025-03-19 | End: 2025-03-21

## 2025-03-19 RX ORDER — INSULIN LISPRO 100 U/ML
INJECTION, SOLUTION INTRAVENOUS; SUBCUTANEOUS
Refills: 0 | Status: DISCONTINUED | OUTPATIENT
Start: 2025-03-19 | End: 2025-03-21

## 2025-03-19 RX ORDER — INSULIN LISPRO 100 U/ML
INJECTION, SOLUTION INTRAVENOUS; SUBCUTANEOUS AT BEDTIME
Refills: 0 | Status: DISCONTINUED | OUTPATIENT
Start: 2025-03-19 | End: 2025-03-21

## 2025-03-19 RX ORDER — SACUBITRIL AND VALSARTAN 49; 51 MG/1; MG/1
1 TABLET, FILM COATED ORAL
Refills: 0 | Status: DISCONTINUED | OUTPATIENT
Start: 2025-03-19 | End: 2025-03-21

## 2025-03-19 RX ORDER — CYANOCOBALAMIN 1000 UG/ML
1000 INJECTION INTRAMUSCULAR; SUBCUTANEOUS DAILY
Refills: 0 | Status: DISCONTINUED | OUTPATIENT
Start: 2025-03-19 | End: 2025-03-21

## 2025-03-19 RX ADMIN — DILTIAZEM HYDROCHLORIDE 5 MG/HR: 240 TABLET, EXTENDED RELEASE ORAL at 22:45

## 2025-03-19 RX ADMIN — DILTIAZEM HYDROCHLORIDE 5 MG/HR: 240 TABLET, EXTENDED RELEASE ORAL at 19:49

## 2025-03-19 RX ADMIN — Medication 250 MILLIGRAM(S): at 19:08

## 2025-03-19 RX ADMIN — DOFETILIDE 250 MICROGRAM(S): 0.5 CAPSULE ORAL at 22:44

## 2025-03-19 RX ADMIN — CEFTRIAXONE 1000 MILLIGRAM(S): 500 INJECTION, POWDER, FOR SOLUTION INTRAMUSCULAR; INTRAVENOUS at 23:18

## 2025-03-19 RX ADMIN — Medication 1000 MILLILITER(S): at 12:03

## 2025-03-19 RX ADMIN — APIXABAN 5 MILLIGRAM(S): 2.5 TABLET, FILM COATED ORAL at 22:44

## 2025-03-19 RX ADMIN — CEFTRIAXONE 1000 MILLIGRAM(S): 500 INJECTION, POWDER, FOR SOLUTION INTRAMUSCULAR; INTRAVENOUS at 15:54

## 2025-03-19 RX ADMIN — SACUBITRIL AND VALSARTAN 1 TABLET(S): 49; 51 TABLET, FILM COATED ORAL at 22:45

## 2025-03-19 RX ADMIN — DILTIAZEM HYDROCHLORIDE 20 MILLIGRAM(S): 240 TABLET, EXTENDED RELEASE ORAL at 15:54

## 2025-03-19 RX ADMIN — METOPROLOL SUCCINATE 5 MILLIGRAM(S): 50 TABLET, EXTENDED RELEASE ORAL at 12:56

## 2025-03-19 NOTE — ED PROVIDER NOTE - PHYSICAL EXAMINATION
Gen'l: Morbidly obese AA M adult, alert, no respiratory discomfort, no sentence shortening, not acutely ill.  Head: NC/AT  Eyes: PERRL, EOMI  ENT: O/P clear, mmm  CV: Tachycardic, irregularly irregular, normal radial pulse  Lungs: CTA, normal respirations  GI: soft, NT, BS+  : Deferred, no flank nor CVAT  Neck: NT, supple w/o pain  MSK: SUAREZ x 4, no focal extremity swelling nor tenderness, B/L SLR 35 degrees w/o pain, normal motor  Skin: no tactile warmth, no rash  Neuro: A+O x 4, CN 2 -12 intact, normal speech, no focal motor/sensory deficits Gen'l: Morbidly obese AA M adult, alert, no respiratory discomfort, no sentence shortening, not acutely ill.  Head: NC/AT  Eyes: PERRL, EOMI  ENT: O/P clear, mmm  CV: Tachycardic, irregularly irregular, normal radial pulse  Lungs: CTA, normal respirations  GI: soft, NT, BS+  : Deferred, no flank nor CVAT  Neck: NT, supple w/o pain  MSK: SUAREZ x 4, no focal extremity swelling nor tenderness, B/L SLR 35 degrees w/o pain, normal motor  Skin: no tactile warmth, no rash. L 1st toe: + small open wound w/ + mild surrounding erythematous swelling  Neuro: A+O x 4, CN 2 -12 intact, normal speech, no focal motor/sensory deficits

## 2025-03-19 NOTE — PATIENT PROFILE ADULT - FALL HARM RISK - HARM RISK INTERVENTIONS

## 2025-03-19 NOTE — CONSULT NOTE ADULT - ASSESSMENT
39 year old male current smoker with PMH: AF (eliquis) s/p DCCV x2 (last DCCV 09/2024), LV dysfunction EF 35-40% with resolution on last TTE 01/2025 with EF 50-55%, HTN, HLD, DM2 (metformin), morbid obesity presented to ED with complaints of rapid HR and palpitations, general weakness. Pt. reports onset of palpitations occurred with physical exertion moving furniture along with feeling "winded" and persisted. Pt. reported recent GI viral symptoms 4 days ago, N/V/D resolved within 24-48hr, exposure to sick family members with the flu over the past few days and new onset of occasional productive cough in last 24hr. Pt. reports infected left great toe x 1 week with drainage.  Last admission, DCCV was successful + pt. loaded with tikosyn, o/p f/u EKG confirmed NSR. Pt. reports compliance with tikosyn. In the ED pt. presented with AF RVR -150s- given Lopressor 5mg IVP x 1 and Cardizem 20mg IVP x 1 with some improvement in rate.     CXR negative, Trop neg x 1, Cr slightly elevated 1.57. Pending limited TTE. UA neg, Lactate neg, BC results pendings, viral swab neg, abx started for infected left great toe.     Plan: AF with RVR  Continue continuous telemetry monitoring  Continue tikosyn 250mcg PO BID  Continue uninterrupted Eliquis  Continue BB, monitor for hypotension  Cardizem may be used for rate control with improved EF 50-55% on last TTE  Limited echo ordered to assess LVEF  Replete lytes : K>4.0, Mg>2.0     39 year old male current smoker with PMH: AF (eliquis) s/p DCCV x2 (last DCCV 09/2024), LV dysfunction EF 35-40% with resolution on last TTE 01/2025 with EF 50-55%, HTN, HLD, DM2 (metformin), morbid obesity presented to ED with complaints of rapid HR and palpitations, general weakness. Pt. reports onset of palpitations occurred with physical exertion moving furniture along with feeling "winded" and persisted. Pt. reported recent GI viral symptoms 4 days ago, N/V/D resolved within 24-48hr, exposure to sick family members with the flu over the past few days and new onset of occasional productive cough in last 24hr. Pt. reports infected left great toe x 1 week with drainage. Pt. endorses missing 1-2 doses of medications over the weekend with GI symptoms N/V/D.   Last admission, DCCV was successful + pt. loaded with tikosyn, o/p f/u EKG confirmed NSR.  In the ED pt. presented with AF RVR -150s- given Lopressor 5mg IVP x 1 and Cardizem 20mg IVP x 1 with some improvement in rate.     CXR negative, Trop neg x 1, Cr slightly elevated 1.57. Pending limited TTE. UA neg, Lactate neg, BC results pendings, viral swab neg, abx started for infected left great toe.     Plan: AF with RVR  Continue continuous telemetry monitoring  Continue tikosyn 250mcg PO BID  Continue uninterrupted Eliquis  Continue BB, monitor for hypotension  Cardizem may be used for rate control with improved EF 50-55% on last TTE  Limited echo ordered to assess LVEF  Replete lytes : K>4.0, Mg>2.0     39 year old male current smoker with PMH: AF (eliquis) s/p DCCV x2 (last DCCV 09/2024), LV dysfunction EF 35-40% with resolution on last TTE 01/2025 with EF 50-55%, HTN, HLD, DM2 (metformin), morbid obesity presented to ED with complaints of rapid HR and palpitations, general weakness. Pt. reports onset of palpitations occurred with physical exertion moving furniture along with feeling "winded" and persisted. Pt. reported recent GI viral symptoms 4 days ago, N/V/D resolved within 24-48hr, exposure to sick family members with the flu over the past few days and new onset of occasional productive cough in last 24hr. Pt. reports infected left great toe x 1 week with drainage. Pt. endorses missing 1-2 doses of medications over the weekend with GI symptoms N/V/D.   Last admission, DCCV was successful + pt. loaded with tikosyn, o/p f/u EKG confirmed NSR.  In the ED pt. presented with AF RVR -150s- given Lopressor 5mg IVP x 1 and Cardizem 20mg IVP x 1 with some improvement in rate.     CXR negative, Trop neg x 1, Cr slightly elevated 1.57. Pending limited TTE. UA neg, Lactate neg, BC results pendings, viral swab neg, abx started for infected left great toe.    Plan: AF with RVR  Continue continuous telemetry monitoring  Continue tikosyn 250mcg PO BID  Continue uninterrupted Eliquis  Continue BB, monitor for hypotension  Cardizem may be used for rate control with improved EF 50-55% on last TTE  Limited echo ordered to assess LVEF  Replete lytes : K>4.0, Mg>2.0    Avoid QT prolongation medications while on tikosyn*  Possible SERA/DCCV   Plan discussed with pt., Dr. Abraham and Dr. Joyner

## 2025-03-19 NOTE — ED PROVIDER NOTE - OBJECTIVE STATEMENT
39-year-old AA male, PMH includes: A-fib on Eliquis and metoprolol, LV dysfunction, HTN, HLD, DM2 on metformin, morbid obesity; BIP private car from home to ED complaining of rapid palpitations, general weakness.  Onset yesterday morning incurred while moving furniture, palps remain persistent, +MATA, but no SOB at rest.   No chest pain, LE edema, F/C.  3/15: + 24-hour N/V/D: self resolved but residual mild general weakness.  Mild dry cough.   Patient tachycardic in ED Intake in 120s, expedited to Main ED for eval and further management.   2/2024: HH admitted for rapid A-fib, required electrical cardioversion.  Cardio: Marioo  & Cliffo 39-year-old AA male, PMH includes: A-Fib on Eliquis and metoprolol, LV dysfunction, HTN, HLD, DM2 on metformin, morbid obesity; BIB private car from home to ED complaining of rapid palpitations, general weakness.  Onset yesterday morning incurred while moving furniture; palps remain persistent, +MATA, but no SOB at rest.   No chest pain, LE edema, F/C.  3/15: + 24-hour episode of N/V/D: self-resolved but residual mild general weakness.  Mild dry cough.   Patient tachycardic in ED Intake in 120s, expedited to Main ED for eval and further management.   2/2024: HH admitted for rapid A-Fib, required electrical cardioversion.  Cardio: Maccaro  & Cliffo

## 2025-03-19 NOTE — H&P ADULT - ASSESSMENT
A/P:    A fib RVR  -s/p IV metoprolol and IV cardizem  -continue Metoroprolol 100 mg daily   -Tikosyn 250 mcg bid  -cardizem drip for HR>100  -Eliquis 5 mg BID  -follow cardiology consult    Left Big toe ulcer  -follow X ray report  -Podiatry consult  -IV Abx  -follow ID consult    DM  -hold metformin  -ISS  -DM diet  -follow Dxt    h/o cardiomyopathy  -clinically better  -Last Echo, EF>50%  -Entresto  -Metoprolol  -Aldactone    PAO  -CPAP at night    Eliquis for DVTppx    Full code

## 2025-03-19 NOTE — ED PROVIDER NOTE - CLINICAL SUMMARY MEDICAL DECISION MAKING FREE TEXT BOX
Imp:   Rapid atrial fibrillation, patient already on Eliquis and metoprolol. BP stable, no cp.    Plan: EKG, chest x-ray, cardiac labs including coags, BNP.  IVF.  IV Lopressor.  EP Cardiology consult.  Monitor observe reassess. Imp:   Rapid atrial fibrillation, patient already on Eliquis and metoprolol. BP stable, no cp.    Plan: EKG, chest x-ray, cardiac labs including coags, BNP.  IVF.  IV Lopressor.  EP Cardiology consult.  Monitor observe reassess.    16:05, CC:  Labs results not actionable.   Chest x-ray report: No acute infiltrate.   EP cardiology consult appreciated, recent echo showed good LVEF so EP advised IV Cardizem dose & Tele admit, + subsequent improvement to HR in low 100s, BP stable.  X-ray left first toe wet read: negative.   IV antibiotics administered for left first toe infection.  Case discussed with Dr. Guzman and Telemetry admit accepted. 39-year-old AA male, PMH includes: A-Fib on Eliquis and metoprolol, LV dysfunction, HTN, HLD, DM2 on metformin, morbid obesity; BIB private car from home to ED complaining of rapid palpitations, general weakness.  Onset yesterday morning incurred while moving furniture; palps remain persistent, +MATA, but no SOB at rest.   No chest pain, LE edema, F/C.  3/15: + 24-hour episode of N/V/D: self-resolved but residual mild general weakness.    Imp:   Rapid atrial fibrillation, patient already on Eliquis and metoprolol. BP stable, no cp.  Plan: EKG, chest x-ray, cardiac labs including coags, BNP.  IVF.  IV Lopressor ( + h/o LV dysfxn.).  EP Cardiology consult.  Monitor observe reassess.    16:05, CC:  Labs results not actionable.   Chest x-ray report: no acute infiltrate.   Inadequate response to IV Lopressor. EP cardiology consult appreciated, recent echo showed good LVEF so EP advised IV Cardizem dose & Tele admit, + subsequent improvement to HR in low 100s, BP stable.  X-ray left first toe wet read: negative.   IV antibiotics administered for left first toe infection.  Case discussed with Dr. Guzman and Telemetry admit accepted.

## 2025-03-19 NOTE — H&P ADULT - HISTORY OF PRESENT ILLNESS
39-year-old AA male, PMH includes: A-fib on Eliquis and metoprolol, LV dysfunction, HTN, HLD, DM2 on metformin, morbid obesity; BIP private car from home to ED complaining of rapid palpitations, general weakness.  Onset yesterday morning incurred while moving furniture, palpitations remain persistent, he also has +MATA, but no SOB at rest.   No chest pain, No LE edema, No Fever/Chills. Mild dry cough.  Patient tachycardic in ED Intake in 120s. No recent travel. No sick contact. He also complain of infection in his left big toe area. He has seen outpatient podiatry , with no improvement.

## 2025-03-19 NOTE — ED STATDOCS - SCRIBE NAME
[facility]  INFECTIOUS DISEASES  at [place]  =======================================================  Omari Cantrell MD  Diplomates American Board of Internal Medicine and Infectious Diseases  Tel  [phone]  Fax [phone]  =======================================================    [id number]  MAGGIE ELLIOTT  follow up: fevers and PNA  breathing better  still wtih some cough.          I have personally reviewed the labs and data; pertinent labs and data are listed in this note; please see below.   =======================================================  Past Medical & Surgical Hx:  =====================  PAST MEDICAL & SURGICAL HISTORY:  COPD (chronic obstructive pulmonary disease)    ROCK (obstructive sleep apnea)    Afib    CHF (congestive heart failure)    Cardiomyopathy, ischemic  wearing life vest  2-28-19    Ejection fraction &lt; 50%  last echo 2-13-19  31%    Asthma    HFrEF (heart failure with reduced ejection fraction)    AICD (automatic cardioverter/defibrillator) present    History of loop recorder  2017  [facility]    History of incision and drainage  right groin abcess  jan 2019  [facility]    History of cardioversion    H/O cardiac catheterization  5 years ago at [facility].    S/P gastric bypass      Problem List:  ==========  HEALTH ISSUES - PROBLEM Dx:  Chronic atrial fibrillation    Chronic systolic congestive heart failure    COPD exacerbation    ROCK on CPAP    DVT prophylaxis    DVT prophylaxis    Hyponatremia    Leukocytosis    Elevated TSH          Social Hx:  =======  no toxic habits currently    FAMILY HISTORY:  Family history of CHF (congestive heart failure)    no significant family history of immunosuppressive disorders in mother or father   =======================================================    REVIEW OF SYSTEMS:  CONSTITUTIONAL: INTERMITTENT fevers  HEENT:  No diplopia or blurred vision.  No earache, sore throat or runny nose.  CARDIOVASCULAR:  No pressure, squeezing, strangling, tightness, heaviness or aching about the chest, neck, axilla or epigastrium.  RESPIRATORY:  No cough, shortness of breath  GASTROINTESTINAL:  No nausea, vomiting or diarrhea.  GENITOURINARY:  No dysuria, frequency or urgency. No Blood in urine  MUSCULOSKELETAL:  no joint aches, no muscle pain  SKIN:  No change in skin, hair or nails.  NEUROLOGIC:  No Headaches, seizures or weakness.  PSYCHIATRIC:  No disorder of thought or mood.  ENDOCRINE:  No heat or cold intolerance  HEMATOLOGICAL:  No easy bruising or bleeding.    =======================================================  Allergies  No Known Drug Allergies  shellfish (Pruritus; Rash)         ======================================================  Physical Exam:  ============     General:  No acute distress.  Pleasant obese  Eye: Pupils are equal, round and reactive to light, Extraocular movements are intact, Normal conjunctiva.  HENT: Normocephalic, Oral mucosa is moist, No pharyngeal erythema, No sinus tenderness.  Neck: Supple, No lymphadenopathy.  Respiratory: Lungs with diminished sounds at bases.   Cardiovascular: Normal rate, Regular rhythm,  LEFT chest wall implanted device.   Gastrointestinal: Soft, Non-tender, Non-distended, Normal bowel sounds.  Genitourinary: No costovertebral angle tenderness.  Lymphatics: No lymphadenopathy neck,   Musculoskeletal: Normal range of motion, Normal strength.  Integumentary: No rash. NO SPLINTER HEMORRHAGES IN THE NAILS  Neurologic: Alert, Oriented, No focal deficits, Cranial Nerves II-XII are grossly intact.  Psychiatric: Appropriate mood & affect.    =======================================================    Vitals:  ============  T(F): 98.2 (18 Aug 2021 04:48), Max: 98.8 (17 Aug 2021 11:34)  HR: 78 (18 Aug 2021 08:35)  BP: 103/60 (18 Aug 2021 04:48)  RR: 18 (18 Aug 2021 04:48)  SpO2: 99% (18 Aug 2021 08:35) (92% - 99%)  temp max in last 48H T(F): , Max: 100.4 (08-16-21 @ 10:36)    =======================================================  Current Antibiotics:  piperacillin/tazobactam IVPB.. 3.375 Gram(s) IV Intermittent every 8 hours  vancomycin  IVPB 1000 milliGRAM(s) IV Intermittent every 12 hours    Other medications:  ALBUTerol    90 MICROgram(s) HFA Inhaler 2 Puff(s) Inhalation every 6 hours  aMIOdarone    Tablet 200 milliGRAM(s) Oral daily  buMETAnide 1 milliGRAM(s) Oral two times a day  fluticasone propionate 50 MICROgram(s)/spray Nasal Spray 1 Spray(s) Both Nostrils two times a day  gabapentin 600 milliGRAM(s) Oral three times a day  lactobacillus acidophilus 1 Tablet(s) Oral two times a day  metoprolol succinate ER 25 milliGRAM(s) Oral daily  pantoprazole    Tablet 40 milliGRAM(s) Oral before breakfast  predniSONE   Tablet 20 milliGRAM(s) Oral daily  rivaroxaban 20 milliGRAM(s) Oral with dinner      =======================================================  Labs:                        10.7   12.68 )-----------( 256      ( 18 Aug 2021 06:15 )             33.1     WBC Count: 12.68 K/uL (08-18-21 @ 06:15)  WBC Count: 14.00 K/uL (08-17-21 @ 15:26)  WBC Count: 25.00 K/uL (08-16-21 @ 10:26)      08-18    133<L>  |  96<L>  |  16.2  ----------------------------<  123<H>  3.8   |  24.0  |  1.08    Ca    8.7      18 Aug 2021 06:15    TPro  6.5<L>  /  Alb  3.1<L>  /  TBili  0.7  /  DBili  x   /  AST  19  /  ALT  23  /  AlkPhos  52  08-18      Culture - Urine (collected 08-16-21 @ 22:19)  Source: Clean Catch Clean Catch (Midstream)             COVID-19 PCR: NotDetec (08-16-21 @ 15:07)        < from: Xray Chest 2 Views PA/Lat (08.16.21 @ 10:44) >  EXAM:  XR CHEST PA LAT 2V                          PROCEDURE DATE:  08/16/2021          INTERPRETATION:  Clinical history: 64-year-old male, shortness of breath.    Three views of the chest are correlated with the chest CT of 8/12/2021 and demonstrate moderate right and minimal left perihilar interstitial pneumonia.    No no gross consolidation, effusion, pneumothorax or acute osseous finding.    Pacemaker with wire tips in the right atrium, right ventricle and coronary sinus, unchanged.    IMPRESSION:  Moderate right and minimal left perihilar interstitial infiltrates, no lobar consolidation, grossly unchanged    --- End of Report ---            SY TRINH DO; Attending Radiologist  This document has been electronically signed. Aug16 2021 10:49AM    < end of copied text >       < from: CT Chest No Cont (08.12.21 @ 14:34) >    EXAM:  CT CHEST        PROCEDURE DATE:  08/12/2021           INTERPRETATION:  EXAMINATION: CT CHEST    CLINICAL INDICATION: Aspiration pneumonia.    TECHNIQUE: Noncontrast CT of the chest was obtained.    COMPARISON: 6/22/2021.    FINDINGS:    AIRWAYS AND LUNGS: The central tracheobronchial tree is patent.  Emphysema. Bronchial mucoid impaction. Near-complete resolution prior bilateral patchy lung opacities with new patchy opacity superior right lower lobe and posterior right upper lobe. Scattered clustered nodules with a lower lobe predominance is decreased but not resolved the prior study.    MEDIASTINUM AND PLEURA: There are no enlarged mediastinal, hilar or axillary lymph nodes. The visualized portion of the thyroid gland is unremarkable. There is no pleural effusion. There is no pneumothorax.    HEART AND VESSELS: There is mild cardiomegaly.  There are atherosclerotic calcifications of the aorta and coronary arteries.  There is no pericardial effusion.  Aortic valve calcification. Left-sided defibrillator.    UPPER ABDOMEN: Images of the upper abdomen demonstrate cholecystectomy. Pneumobilia. Esophagus is distended with debris    BONES AND SOFT TISSUES: There are mild degenerative changes of the spine.  The soft tissues are unremarkable.    IMPRESSION:  Near-complete resolution prior bilateral patchy lung opacities with new patchy opacity superior right lower lobe and posterior right upper lobe. Continued follow-up CT recommended.    --- End of Report ---               CANDICE LEVINE MD; Attending Radiologist   This document has been electronically signed. Aug 16 2021 10:42AM    < end of copied text >   Perry Attakhil

## 2025-03-19 NOTE — PATIENT PROFILE ADULT - NUMBER OF YRS
Javon Dean NP writer left  for patient to schedule a physical with Dr. Erazo when she returns in August.  
10

## 2025-03-19 NOTE — ED ADULT TRIAGE NOTE - CHIEF COMPLAINT QUOTE
Pt ambulatory to the ER c/o palpitations, pt reports that he has a hx of afib on eliquis. Denies CP, SOB. Pt HR 78 in triage.

## 2025-03-19 NOTE — H&P ADULT - NSHPPHYSICALEXAM_GEN_ALL_CORE
T(C): 37.2 (03-19-25 @ 12:50), Max: 37.2 (03-19-25 @ 12:50)  HR: 90 (03-19-25 @ 16:07) (78 - 125)  BP: 108/74 (03-19-25 @ 15:50) (97/68 - 126/107)  RR: 20 (03-19-25 @ 15:50) (11 - 20)  SpO2: 100% (03-19-25 @ 15:50) (97% - 100%)    CONSTITUTIONAL: Well groomed, no apparent distress, morbid obesity   EYES: PERRLA and symmetric, EOMI, No conjunctival or scleral injection, non-icteric  ENMT: Oral mucosa with moist membranes. no pharyngeal injection or exudates             NECK: Supple, symmetric and without tracheal deviation   RESP: No respiratory distress, no use of accessory muscles; CTA b/l, no WRR  CV:irregularly irregular, +S1S2, no MRG; no JVD; no peripheral edema  GI: Soft, NT, ND, no rebound, no guarding; no palpable masses; no hepatosplenomegaly; no hernia palpated  LYMPH: No cervical LAD or tenderness;   MSK: Normal ROM without pain, no spinal tenderness, normal muscle strength/tone  SKIN: Left big toe: +ulcers   NEURO: CN II-XII intact; normal reflexes in upper and lower extremities, sensation intact in upper and lower extremities b/l to light touch   PSYCH: Appropriate insight/judgment; A+O x 3, mood and affect appropriate, recent/remote memory intact

## 2025-03-19 NOTE — ED ADULT NURSE REASSESSMENT NOTE - NS ED NURSE REASSESS COMMENT FT1
Patient received at 1245 CM rapid a-fib 125 -140.  Patient denies shortness of breath, chest pain at this time.  NS bolus infusing via left arm IV.  IV site clean, dry and intact with no signs of infiltration noted.  Metoprolol given as per MD orders.  VS as charted will monitor.

## 2025-03-19 NOTE — PROCEDURE NOTE - ADDITIONAL PROCEDURE DETAILS
Patient was met in the Emergency Room. After complete history and physical exams, diagnosis of Left Hallux Onychocryptosis was made. Treatment procedure of partial toenail avulsion, including risk and benefits, was explained in details to the patient. Patient verbally consented to the procedure. Local anesthesia nerve block was administered to the right foot utilizing 9cc 1% lidocaine plain. Attention was then directed to the Left hallux. Anesthesia check was performed and verified. Local neurovascular structures appeared intact. Sterile instruments including english anvil, hemostat and freer elevator were utilized to remove the toenail. The proximal nail fold was surveyed to ensure any residual toenail and debris were removed. The wound was flushed copiously with normal saline. Dressing, consisting of bacitracin, xeroform, gauze, kerlix and co-band was applied to left foot wound. Patient tolerated the procedure and anesthesia well with no complications. Patient will be followed while in house  Thank you Patient was met in the Emergency Room. After complete history and physical exams, diagnosis of Left Hallux Onychocryptosis was made. Treatment procedure of partial toenail avulsion, including risk and benefits, was explained in details to the patient. Patient verbally consented to the procedure. Local anesthesia nerve block was administered to the right foot utilizing 9cc 1% lidocaine plain. Attention was then directed to the Left hallux. Anesthesia check was performed and verified. Local neurovascular structures appeared intact. Sterile instruments including english anvil, hemostat and freer elevator were utilized to remove the toenail. The proximal nail fold was surveyed to ensure any residual toenail and debris were removed. The wound was flushed copiously with normal saline. Dressing, consisting of bacitracin, xeroform, gauze, morgan and co-band was applied to left foot wound. Patient tolerated the procedure and anesthesia well with no complications. Patient will be followed while in house  Thank you

## 2025-03-19 NOTE — CONSULT NOTE ADULT - ASSESSMENT
A: 39-year-old Male seen for the followin. Partial thickness wound to Left Hallux  2.  Onychocryptosis to Left Hallux  2. Diabetes Mellitus II  3.  Pain to Left foot       P:   Chart reviewed and Patient evaluated;  Discussed diagnosis and treatment with patient. Discussed importance of daily foot examinations, proper shoe gear, importance of tight glycemic control.   X-rays reviewed : on wet read showing no soft tissue emphysema and  cortical erosion   patient is presenting with small open lesion with hyperkeratotic border at tip of hallux, mild edema, - shahram-wound erythema, scanty serosangenous discharge, not probe to bone. Pt is afebrile, has no leukocytosis, WBC 5  Wound probed, exsanguinated and flushed    Applied betadine with dry sterile dressing  WBAT  to Left foot in dispensed surgical shoe  All additional care per Med appreciated  Patient demonstrated verbal understanding of all interventions and tolerated interventions well without any complications.         Case D/W attending Dr. Carlos   A: 39-year-old Male seen for the followin. Partial thickness wound to Left Hallux  2.  Onychocryptosis to Left Hallux  2. Diabetes Mellitus II  3.  Pain to Left foot       P:   Chart reviewed and Patient evaluated;  Discussed diagnosis and treatment with patient. Discussed importance of daily foot examinations, proper shoe gear, importance of tight glycemic control.   X-rays reviewed : on wet read showing no soft tissue emphysema and  cortical erosion   patient is presenting with small open lesion with hyperkeratotic border at tip of hallux, mild edema, - shahram-wound erythema, scanty serosangenous discharge, not probe to bone. Pt is afebrile, has no leukocytosis, WBC 5  Partial toenail avulsion --> please see procedure note  Wound probed, exsanguinated and flushed    Applied bacitracin, xeroform with betadine with dry sterile dressing  WBAT  to Left foot in dispensed surgical shoe  All additional care per Med appreciated  Patient demonstrated verbal understanding of all interventions and tolerated interventions well without any complications.         Case D/W attending Dr. Carlos   A: 39-year-old Male seen for the followin. Partial thickness wound to Left Hallux  2.  Onychocryptosis to Left Hallux  2. Diabetes Mellitus II  3.  Pain to Left foot       P:   Chart reviewed and Patient evaluated;  Discussed diagnosis and treatment with patient. Discussed importance of daily foot examinations, proper shoe gear, importance of tight glycemic control.   X-rays reviewed : on wet read showing no soft tissue emphysema and  cortical erosion   patient is presenting with small open lesion with hyperkeratotic border at tip of hallux, mild edema, - shahram-wound erythema, scanty serosangenous discharge, not probe to bone. Pt is afebrile, has no leukocytosis, WBC 5  Partial toenail avulsion (Dos 3/19/25) --> please see procedure note  Wound probed, exsanguinated and flushed    Applied bacitracin, xeroform with betadine with dry sterile dressing  WBAT  to Left foot in dispensed surgical shoe  All additional care per Med appreciated  Patient demonstrated verbal understanding of all interventions and tolerated interventions well without any complications.         Case D/W attending Dr. Carlos

## 2025-03-19 NOTE — ED ADULT NURSE REASSESSMENT NOTE - NS ED NURSE REASSESS COMMENT FT1
Pt received from ANNAL[facility] Barreto at 1900. Pt in NAD, A+Ox4. CM reading afib, between 100-130bpm. Bp, Spo2, and temp stable. 2nd IV obtained and IV Cardizem gtt initiated for rate control. Pt Denies cp, sob, N/V. Care continues as ordered. IVabx infusing for L big toe infection. Fall and safety precautions maintained. Awaiting bed assignment and further orders. Call bell is within reach. Pt profile completed.

## 2025-03-19 NOTE — CONSULT NOTE ADULT - SUBJECTIVE AND OBJECTIVE BOX
HPI:    39-year-old AA male, PMH includes: A-fib on Eliquis and metoprolol, LV dysfunction, HTN, HLD, DM2 on metformin, morbid obesity; BIP private car from home to ED complaining of rapid palpitations, general  weakness.  Onset yesterday morning incurred while moving furniture, palps remain persistent, +MATA, but no SOB at rest.   No chest pain, LE edema, F/C.  3/15: + 24-hour N/V/D: self resolved but residual mild  general weakness.  Mild dry cough.   Patient tachycardic in ED Intake in 120s, expedited to Main ED for eval and further management.  2024: HH admitted for rapid A-fib, required electrical cardioversion.  Cardio: Leigh  & Julio     : EPS team consulted patient in the ED. Pt. reports onset of N/V/D occurred 4 days ago and resolved within 24-48 hrs, after physical exertion yesterday he felt "winded" with palpitations and checked his apple watch to see that his HR was elevated. Pt. reports feeling mild palpitations now with current -140s on telemetry, denies cp, sob, mata, n/v/d, abdominal discomfort, febrile, diaphoresis, lightheadedness, dizziness, syncope. Pt. endorses exposure to sick family contacts with the flu in the past few days. Pt. also reports  a newly infected left great toe for 1 week from cutting his nails with reported discharge, has been unable to schedule a podiatry visit. Pt. reports compliance with tikosyn and meds. Pt. denies recent ETOH use and endorses occasional tobacco (1-2 cigarettes/day).     Tele: AF w/ RVR. Frequent PVCs, ventricular couplets/triplets, 4 beat runs of NSVT. HR: 100-150s  EKG: AF w/ RVR with PVC or aberrantly conducted complexes  Vent. Rate: 120 bpm, QRS: 92ms, QTc: 424ms      PAST MEDICAL & SURGICAL HISTORY:  HTN (hypertension)  HLD (hyperlipidemia)  No significant past surgical history    MEDICATIONS  (STANDING):  cefTRIAXone Injectable. 1000 milliGRAM(s) IV Push Once  diltiazem Injectable 20 milliGRAM(s) IV Push Once  vancomycin  IVPB. 1000 milliGRAM(s) IV Intermittent once      Allergies  penicillins (Unknown)  Intolerances    SOCIAL HISTORY: Denies tobacco, etoh abuse or illicit drug use    FAMILY HISTORY:  FH: CAD (coronary artery disease)  FH: type 2 diabetes  FH: HTN (hypertension)    Vital Signs Last 24 Hrs  T(C): 37.2 (19 Mar 2025 12:50), Max: 37.2 (19 Mar 2025 12:50)  T(F): 98.9 (19 Mar 2025 12:50), Max: 98.9 (19 Mar 2025 12:50)  HR: 115 (19 Mar 2025 13:16) (78 - 125)  BP: 109/89 (19 Mar 2025 13:16) (97/68 - 126/107)  BP(mean): 95 (19 Mar 2025 13:16) (95 - 96)  RR: 11 (19 Mar 2025 13:16) (11 - 18)  SpO2: 97% (19 Mar 2025 13:16) (97% - 100%)    Parameters below as of 19 Mar 2025 13:16  Patient On (Oxygen Delivery Method): room air      REVIEW OF SYSTEMS:    CONSTITUTIONAL:  As per HPI.  HEENT:  Eyes:  Denies diplopia or blurred vision. ENT:  No earache, sore throat or runny nose.  CARDIOVASCULAR:  Denies pressure, squeezing, strangling, tightness, heaviness or aching about the chest, neck, axilla or epigastrium.  RESPIRATORY: Denies SOB, PND or orthopnea. Endorses new occasional productive cough with clear sputum started 24 hrs ago.   GASTROINTESTINAL:  Denies nausea, vomiting or diarrhea.  GENITOURINARY:  Denies dysuria, frequency or urgency.  MUSCULOSKELETAL:  Denies weakness.   SKIN:  Endorses new left foot great toe toenail infection for 1 week with drainage.   NEUROLOGIC:  Denies paresthesias, fasciculations, seizures or weakness.  PSYCHIATRIC:  Denies disorder of thought or mood.  ENDOCRINE:  Denies heat or cold intolerance, polyuria or polydipsia.  HEMATOLOGICAL:  Denies easy bruising or bleedings:    PHYSICAL EXAMINATION:    GENERAL APPEARANCE:  Pt. is alert and awake. Well appearing. Well groomed.   HEENT:  Normocephalic. Pupils are normal and react normally. No icterus. Moist mucous membranes.   NECK:  Supple. No lymphadenopathy. No JVD. No carotid bruits.   HEART:  Irregularly irregular, tachycardiac. No murmurs, rubs or gallops noted.  CHEST:  Clear lung sounds in all fields auscultated. Normal respiratory effort. Equal expansion of chest.   ABDOMEN:  Soft, nontender, bowel sounds present on auscultation. No flank or CVA tenderness.   EXTREMITIES:  Equal strength of extremities. Full ROM. No extremity swelling, tenderness. Pulses +2 palpable in extremities.  SKIN:  Left great toe infected toenail with discharge. Skin warm and dry.       I&O's Summary      LABS:                        16.7   5.76  )-----------( 221      ( 19 Mar 2025 12:00 )             49.5     135  |  109[H]  |  24[H]  ----------------------------<  160[H]  4.5   |  22  |  1.57[H]    Ca    8.9      19 Mar 2025 12:00  Mg     1.9     -  TPro  7.4  /  Alb  3.3  /  TBili  0.5  /  DBili  x   /  AST  21  /  ALT  32  /  AlkPhos  57  03-    LIVER FUNCTIONS - ( 19 Mar 2025 12:00 )  Alb: 3.3 g/dL / Pro: 7.4 gm/dL / ALK PHOS: 57 U/L / ALT: 32 U/L / AST: 21 U/L / GGT: x         PT/INR - ( 19 Mar 2025 12:00 )   PT: 14.4 sec;   INR: 1.25 ratio    PTT - ( 19 Mar 2025 12:00 )  PTT:44.1 sec    Urinalysis Basic - ( 19 Mar 2025 14:50 )    Color: Yellow / Appearance: Clear / S.025 / pH: x  Gluc: x / Ketone: Negative mg/dL  / Bili: Negative / Urobili: 0.2 mg/dL   Blood: x / Protein: 30 mg/dL / Nitrite: Negative   Leuk Esterase: Negative / RBC: 1 /HPF / WBC 1 /HPF   Sq Epi: x / Non Sq Epi: 1 /HPF / Bacteria: Negative /HPF    Lactate, Blood (25 @ 15:11)   Lactate, Blood: 1.1 mmol/L    RADIOLOGY & ADDITIONAL STUDIES:  < from: Xray Chest 1 View- PORTABLE-Urgent (Xray Chest 1 View- PORTABLE-Urgent .) (25 @ 12:29) >  ACC: [id number] EXAM:  XR CHEST PORTABLE URGENT 1V   ORDERED BY: JAMARI MITCHELL     PROCEDURE DATE:  2025          INTERPRETATION:  EXAM: XR CHEST URGENT    INDICATION: cough JXR    COMPARISON: 2024    IMPRESSION: Limited inspiration portable exam accentuates perihilar   interstitial markings. No focal infiltrate. Heart is at the upper limits   of normal in its transthoracic diameter. Regional osseous structures   appropriate for age. Follow-up suggested as indicated clinically. Some   elevation of the right hemidiaphragm noted.      < end of copied text >       HPI:    39-year-old AA male, PMH includes: A-fib on Eliquis and metoprolol, LV dysfunction, HTN, HLD, DM2 on metformin, morbid obesity; BIP private car from home to ED complaining of rapid palpitations, general  weakness.  Onset yesterday morning incurred while moving furniture, palps remain persistent, +MATA, but no SOB at rest.   No chest pain, LE edema, F/C.  3/15: + 24-hour N/V/D: self resolved but residual mild  general weakness.  Mild dry cough.   Patient tachycardic in ED Intake in 120s, expedited to Main ED for eval and further management.  2024: HH admitted for rapid A-fib, required electrical cardioversion.  Cardio: Leigh  & Julio     : EPS team consulted patient in the ED. Pt. reports onset of N/V/D occurred 4 days ago and resolved within 24-48 hrs, after physical exertion yesterday he felt "winded" with palpitations and checked his apple watch to see that his HR was elevated. Pt. reports feeling mild palpitations now with current -140s on telemetry, denies cp, sob, mata, n/v/d, abdominal discomfort, febrile, diaphoresis, lightheadedness, dizziness, syncope. Pt. endorses exposure to sick family contacts with the flu in the past few days. Pt. also reports  a newly infected left great toe for 1 week from cutting his nails with reported discharge, has been unable to schedule a podiatry visit. Pt. denies recent ETOH use and endorses occasional tobacco (1-2 cigarettes/day). Pt. reports general compliance with medications, however endorses missing 1-2 doses of his medications over the weekend with GI symptoms N/V/D.    Tele: AF w/ RVR. Frequent PVCs, ventricular couplets/triplets, 4 beat runs of NSVT. HR: 100-150s  EKG: AF w/ RVR with PVC or aberrantly conducted complexes  Vent. Rate: 120 bpm, QRS: 92ms, QTc: 424ms      PAST MEDICAL & SURGICAL HISTORY:  HTN (hypertension)  HLD (hyperlipidemia)  No significant past surgical history    MEDICATIONS  (STANDING):  cefTRIAXone Injectable. 1000 milliGRAM(s) IV Push Once  diltiazem Injectable 20 milliGRAM(s) IV Push Once  vancomycin  IVPB. 1000 milliGRAM(s) IV Intermittent once      Allergies  penicillins (Unknown)  Intolerances    SOCIAL HISTORY: Denies tobacco, etoh abuse or illicit drug use    FAMILY HISTORY:  FH: CAD (coronary artery disease)  FH: type 2 diabetes  FH: HTN (hypertension)    Vital Signs Last 24 Hrs  T(C): 37.2 (19 Mar 2025 12:50), Max: 37.2 (19 Mar 2025 12:50)  T(F): 98.9 (19 Mar 2025 12:50), Max: 98.9 (19 Mar 2025 12:50)  HR: 115 (19 Mar 2025 13:16) (78 - 125)  BP: 109/89 (19 Mar 2025 13:16) (97/68 - 126/107)  BP(mean): 95 (19 Mar 2025 13:16) (95 - 96)  RR: 11 (19 Mar 2025 13:16) (11 - 18)  SpO2: 97% (19 Mar 2025 13:16) (97% - 100%)    Parameters below as of 19 Mar 2025 13:16  Patient On (Oxygen Delivery Method): room air      REVIEW OF SYSTEMS:    CONSTITUTIONAL:  As per HPI.  HEENT:  Eyes:  Denies diplopia or blurred vision. ENT:  No earache, sore throat or runny nose.  CARDIOVASCULAR:  Denies pressure, squeezing, strangling, tightness, heaviness or aching about the chest, neck, axilla or epigastrium.  RESPIRATORY: Denies SOB, PND or orthopnea. Endorses new occasional productive cough with clear sputum started 24 hrs ago.   GASTROINTESTINAL:  Denies nausea, vomiting or diarrhea.  GENITOURINARY:  Denies dysuria, frequency or urgency.  MUSCULOSKELETAL:  Denies weakness.   SKIN:  Endorses new left foot great toe toenail infection for 1 week with drainage.   NEUROLOGIC:  Denies paresthesias, fasciculations, seizures or weakness.  PSYCHIATRIC:  Denies disorder of thought or mood.  ENDOCRINE:  Denies heat or cold intolerance, polyuria or polydipsia.  HEMATOLOGICAL:  Denies easy bruising or bleedings:    PHYSICAL EXAMINATION:    GENERAL APPEARANCE:  Pt. is alert and awake. Well appearing. Well groomed.   HEENT:  Normocephalic. Pupils are normal and react normally. No icterus. Moist mucous membranes.   NECK:  Supple. No lymphadenopathy. No JVD. No carotid bruits.   HEART:  Irregularly irregular, tachycardiac. No murmurs, rubs or gallops noted.  CHEST:  Clear lung sounds in all fields auscultated. Normal respiratory effort. Equal expansion of chest.   ABDOMEN:  Soft, nontender, bowel sounds present on auscultation. No flank or CVA tenderness.   EXTREMITIES:  Equal strength of extremities. Full ROM. No extremity swelling, tenderness. Pulses +2 palpable in extremities.  SKIN:  Left great toe infected toenail with discharge. Skin warm and dry.       I&O's Summary      LABS:                        16.7   5.76  )-----------( 221      ( 19 Mar 2025 12:00 )             49.5     135  |  109[H]  |  24[H]  ----------------------------<  160[H]  4.5   |  22  |  1.57[H]    Ca    8.9      19 Mar 2025 12:00  Mg     1.9       TPro  7.4  /  Alb  3.3  /  TBili  0.5  /  DBili  x   /  AST  21  /  ALT  32  /  AlkPhos  57  -    LIVER FUNCTIONS - ( 19 Mar 2025 12:00 )  Alb: 3.3 g/dL / Pro: 7.4 gm/dL / ALK PHOS: 57 U/L / ALT: 32 U/L / AST: 21 U/L / GGT: x         PT/INR - ( 19 Mar 2025 12:00 )   PT: 14.4 sec;   INR: 1.25 ratio    PTT - ( 19 Mar 2025 12:00 )  PTT:44.1 sec    Urinalysis Basic - ( 19 Mar 2025 14:50 )    Color: Yellow / Appearance: Clear / S.025 / pH: x  Gluc: x / Ketone: Negative mg/dL  / Bili: Negative / Urobili: 0.2 mg/dL   Blood: x / Protein: 30 mg/dL / Nitrite: Negative   Leuk Esterase: Negative / RBC: 1 /HPF / WBC 1 /HPF   Sq Epi: x / Non Sq Epi: 1 /HPF / Bacteria: Negative /HPF    Lactate, Blood (25 @ 15:11)   Lactate, Blood: 1.1 mmol/L    RADIOLOGY & ADDITIONAL STUDIES:  < from: Xray Chest 1 View- PORTABLE-Urgent (Xray Chest 1 View- PORTABLE-Urgent .) (25 @ 12:29) >  ACC: [id number] EXAM:  XR CHEST PORTABLE URGENT 1V   ORDERED BY: JAMARI MITCHELL     PROCEDURE DATE:  2025          INTERPRETATION:  EXAM: XR CHEST URGENT    INDICATION: cough JXR    COMPARISON: 2024    IMPRESSION: Limited inspiration portable exam accentuates perihilar   interstitial markings. No focal infiltrate. Heart is at the upper limits   of normal in its transthoracic diameter. Regional osseous structures   appropriate for age. Follow-up suggested as indicated clinically. Some   elevation of the right hemidiaphragm noted.      < end of copied text >       HPI:    39-year-old AA male, PMH includes: A-fib on Eliquis and metoprolol, LV dysfunction, HTN, HLD, DM2 on metformin, morbid obesity; BIP private car from home to ED complaining of rapid palpitations, general  weakness.  Onset yesterday morning incurred while moving furniture, palps remain persistent, +MATA, but no SOB at rest.   No chest pain, LE edema, F/C.  3/15: + 24-hour N/V/D: self resolved but residual mild  general weakness.  Mild dry cough.   Patient tachycardic in ED Intake in 120s, expedited to Main ED for eval and further management.  2024: HH admitted for rapid A-fib, required electrical cardioversion.  Cardio: Leigh  & Julio     : EPS team consulted patient in the ED. Pt. reports onset of N/V/D occurred 4 days ago and resolved within 24-48 hrs, after physical exertion yesterday he felt "winded" with palpitations and checked his apple watch to see that his HR was elevated. Patient reports feeling mild palpitations now with current -140s on telemetry, denies cp, sob, mata, n/v/d, abdominal discomfort, febrile, diaphoresis, lightheadedness, dizziness, syncope. Pt. endorses exposure to sick family contacts with the flu in the past few days. Pt. also reports  a newly infected left great toe for 1 week from cutting his nails with reported discharge, has been unable to schedule a podiatry visit. Pt. denies recent ETOH use and endorses occasional tobacco (1-2 cigarettes/day). Pt. reports general compliance with medications, however endorses missing 1-2 doses of his medications over the weekend with GI symptoms N/V/D.    Tele: AF w/ RVR. Frequent PVCs, ventricular couplets/triplets, 4 beat runs of NSVT. HR: 100-150s  EKG: AF w/ RVR with PVC or aberrantly conducted complexes  Vent. Rate: 120 bpm, QRS: 92ms, QTc: 424ms      PAST MEDICAL & SURGICAL HISTORY:  HTN (hypertension)  HLD (hyperlipidemia)  No significant past surgical history    MEDICATIONS  (STANDING):  cefTRIAXone Injectable. 1000 milliGRAM(s) IV Push Once  diltiazem Injectable 20 milliGRAM(s) IV Push Once  vancomycin  IVPB. 1000 milliGRAM(s) IV Intermittent once      Allergies  penicillins (Unknown)  Intolerances    SOCIAL HISTORY: Denies tobacco, etoh abuse or illicit drug use    FAMILY HISTORY:  FH: CAD (coronary artery disease)  FH: type 2 diabetes  FH: HTN (hypertension)    Vital Signs Last 24 Hrs  T(C): 37.2 (19 Mar 2025 12:50), Max: 37.2 (19 Mar 2025 12:50)  T(F): 98.9 (19 Mar 2025 12:50), Max: 98.9 (19 Mar 2025 12:50)  HR: 115 (19 Mar 2025 13:16) (78 - 125)  BP: 109/89 (19 Mar 2025 13:16) (97/68 - 126/107)  BP(mean): 95 (19 Mar 2025 13:16) (95 - 96)  RR: 11 (19 Mar 2025 13:16) (11 - 18)  SpO2: 97% (19 Mar 2025 13:16) (97% - 100%)    Parameters below as of 19 Mar 2025 13:16  Patient On (Oxygen Delivery Method): room air      REVIEW OF SYSTEMS:    CONSTITUTIONAL:  As per HPI.  HEENT:  Eyes:  Denies diplopia or blurred vision. ENT:  No earache, sore throat or runny nose.  CARDIOVASCULAR:  Denies pressure, squeezing, strangling, tightness, heaviness or aching about the chest, neck, axilla or epigastrium.  RESPIRATORY: Denies SOB, PND or orthopnea. Endorses new occasional productive cough with clear sputum started 24 hrs ago.   GASTROINTESTINAL:  Denies nausea, vomiting or diarrhea.  GENITOURINARY:  Denies dysuria, frequency or urgency.  MUSCULOSKELETAL:  Denies weakness.   SKIN:  Endorses new left foot great toe toenail infection for 1 week with drainage.   NEUROLOGIC:  Denies paresthesias, fasciculations, seizures or weakness.  PSYCHIATRIC:  Denies disorder of thought or mood.  ENDOCRINE:  Denies heat or cold intolerance, polyuria or polydipsia.  HEMATOLOGICAL:  Denies easy bruising or bleedings:    PHYSICAL EXAMINATION:    GENERAL APPEARANCE:  Pt. is alert and awake. Well appearing. Well groomed.   HEENT:  Normocephalic. Pupils are normal and react normally. No icterus. Moist mucous membranes.   NECK:  Supple. No lymphadenopathy. No JVD. No carotid bruits.   HEART:  Irregularly irregular, tachycardiac. No murmurs, rubs or gallops noted.  CHEST:  Clear lung sounds in all fields auscultated. Normal respiratory effort. Equal expansion of chest.   ABDOMEN:  Soft, nontender, bowel sounds present on auscultation. No flank or CVA tenderness.   EXTREMITIES:  Equal strength of extremities. Full ROM. No extremity swelling, tenderness. Pulses +2 palpable in extremities.  SKIN:  Left great toe infected toenail with discharge. Skin warm and dry.       I&O's Summary      LABS:                        16.7   5.76  )-----------( 221      ( 19 Mar 2025 12:00 )             49.5     135  |  109[H]  |  24[H]  ----------------------------<  160[H]  4.5   |  22  |  1.57[H]    Ca    8.9      19 Mar 2025 12:00  Mg     1.9       TPro  7.4  /  Alb  3.3  /  TBili  0.5  /  DBili  x   /  AST  21  /  ALT  32  /  AlkPhos  57      LIVER FUNCTIONS - ( 19 Mar 2025 12:00 )  Alb: 3.3 g/dL / Pro: 7.4 gm/dL / ALK PHOS: 57 U/L / ALT: 32 U/L / AST: 21 U/L / GGT: x         PT/INR - ( 19 Mar 2025 12:00 )   PT: 14.4 sec;   INR: 1.25 ratio    PTT - ( 19 Mar 2025 12:00 )  PTT:44.1 sec    Urinalysis Basic - ( 19 Mar 2025 14:50 )    Color: Yellow / Appearance: Clear / S.025 / pH: x  Gluc: x / Ketone: Negative mg/dL  / Bili: Negative / Urobili: 0.2 mg/dL   Blood: x / Protein: 30 mg/dL / Nitrite: Negative   Leuk Esterase: Negative / RBC: 1 /HPF / WBC 1 /HPF   Sq Epi: x / Non Sq Epi: 1 /HPF / Bacteria: Negative /HPF    Lactate, Blood (25 @ 15:11)   Lactate, Blood: 1.1 mmol/L    RADIOLOGY & ADDITIONAL STUDIES:  < from: Xray Chest 1 View- PORTABLE-Urgent (Xray Chest 1 View- PORTABLE-Urgent .) (25 @ 12:29) >  ACC: [id number] EXAM:  XR CHEST PORTABLE URGENT 1V   ORDERED BY: JAMARI MITCHELL     PROCEDURE DATE:  2025          INTERPRETATION:  EXAM: XR CHEST URGENT    INDICATION: cough JXR    COMPARISON: 2024    IMPRESSION: Limited inspiration portable exam accentuates perihilar   interstitial markings. No focal infiltrate. Heart is at the upper limits   of normal in its transthoracic diameter. Regional osseous structures   appropriate for age. Follow-up suggested as indicated clinically. Some   elevation of the right hemidiaphragm noted.      < end of copied text >

## 2025-03-19 NOTE — PHARMACOTHERAPY INTERVENTION NOTE - OUTCOME
accepted ID CONSULTATION-- Guille Berry [phone]    Patient is a 45y old  Male who presents with a chief complaint of Cycle 1 of DA-R-EPOCH (23 Feb 2018 17:37)    HPI:  Patient is a 44-year-old male who is HIV-positive and has been diagnosed with a high-grade B-cell, CD10+ lymphoma admitted for cycle 1 DA-R-EPOCH.  Patient was initially seen at  [facility] with fever. He was found to have left axillary lymphadenopathy. He had not been fully compliant with his antiretroviral therapy until December of 2017 when he restarted his medication. His CD4 count in December 2017 was in 800 range.  CT of chest showed left axillary lymphadenopathy with a node measuring up to 5 cm. Minimal bibasilar and right middle lobe atelectasis was seen with a tiny left pleural effusion. Mild cardiomegaly was noted. CT of abdomen and pelvis showed no hepatosplenomegaly or mass and no significant adenopathy.. Gastric wall thickening was seen of unclear etiology.  Left axillary lymph node biopsy showed a high-grade CD10 positive B cell lymphoma expressing CD20 and BCL 6. 65% of nuclei showed a myc rearrangement. BCL-2 and BCL 6 were not rearranged. Ki-67 was about 90%.. LDH was 618, but I think it was tested using a higher range. CBC, creat were normal. Total protein and globulins were increased. He defervesced on antibiotics and was discharged home. (22 Feb 2018 10:29)      PAST MEDICAL & SURGICAL HISTORY:  HIV (human immunodeficiency virus infection)  High grade B-cell lymphoma    Reports being diagnosed with HIV/AIds in 2002. He thinks he ahd pneumonia at that time  denies a history of TB      SOCIAL:  works as a     FAMILY HISTORY:  No pertinent family history in first degree relatives    REVIEW OF SYSTEMS  General:	Denies any malaise fatigue or chills. Fevers absent    Skin:No rash  	  Ophthalmologic:Denies any visual complaints,discharge redness or photophobia  	  ENMT:No nasal discharge,headache,sinus congestion or throat pain.No dental complaints    Respiratory and Thorax:No cough,sputum or chest pain.Denies shortness of breath  	  Cardiovascular:	No chest pain,palpitaions or dizziness    Gastrointestinal:	NO nausea,abdominal pain or diarrhea.    Genitourinary:	No dysuria,frequency. No flank pain    Musculoskeletal:	No joint swelling or pain.No weakness    Neurological:No confusion,diziness.No extremity weakness.No bladder or bowel incontinence	    Psychiatric:No delusions or hallucinations	    Hematology/Lymphatics:	No LN swelling.No gum bleeding     Endocrine:	No recent weight gain or loss.No abnormal heat/cold intolerance    Allergic/Immunologic:	No hives or rash   Allergies    No Known Allergies    Intolerances        ANTIMICROBIALS:    abacavir 600 mG/dolutegravir 50 mG/lamiVUDine 300 mG 1 Tablet(s) Oral daily  acyclovir   Tablet 400 milliGRAM(s) Oral three times a day  trimethoprim  160 mG/sulfamethoxazole 800 mG 1 Tablet(s) Oral <User Schedule>      Vital Signs Last 24 Hrs  T(C): 36.7 (27 Feb 2018 17:43), Max: 36.7 (27 Feb 2018 17:43)  T(F): 98 (27 Feb 2018 17:43), Max: 98 (27 Feb 2018 17:43)  HR: 79 (27 Feb 2018 17:43) (77 - 91)  BP: 138/82 (27 Feb 2018 17:43) (125/85 - 151/81)  BP(mean): --  RR: 18 (27 Feb 2018 17:43) (18 - 18)  SpO2: 97% (27 Feb 2018 17:43) (96% - 99%)    PHYSICAL EXAM:Pleasant patient in no acute distress.      Constitutional:Comfortable.Awake and alert  No cachexia     Eyes:PERRL EOMI.NO discharge or conjunctival injection    ENMT:No sinus tenderness.No thrush.No pharyngeal exudate or erythema.Fair dental hygiene    Neck:Supple,No LN,no JVD      Respiratory:Good air entry bilaterally,CTA    Cardiovascular:S1 S2 wnl, No murmurs,rub or gallops    Gastrointestinal:Soft BS(+) no tenderness no masses ,No rebound or guarding    Genitourinary:No CVA tendereness     Rectal:    Extremities:No cyanosis,clubbing or edema.    Vascular:peripheral pulses felt    Neurological:AAO X 3,No grossly focal deficits    Skin:No rash     Lymph Nodes:No palpable LNs    Musculoskeletal:No joint swelling or LOM    Psychiatric:Affect normal.                              13.4   5.5   )-----------( 192      ( 27 Feb 2018 06:59 )             41.9       02-27    136  |  97  |  16  ----------------------------<  125<H>  3.8   |  27  |  0.95    Ca    8.9      27 Feb 2018 06:59  Phos  3.5     02-27  Mg     2.1     02-27    TPro  7.4  /  Alb  3.4  /  TBili  0.5  /  DBili  x   /  AST  16  /  ALT  39  /  AlkPhos  57  02-27      RECENT CULTURES:  02-24 @ 03:08  .Urine Clean Catch (Midstream)  --  --  --    <10,000 CFU/ml Normal Urogenital [place] present  --  02-24 @ 00:18  .Blood Blood-Venous  --  --  --    No growth to date.  --  02-24 @ 00:17  .Blood Blood-Peripheral  --  --  --    No growth to date.  --      RADIOLOGY:    < from: Xray Chest 1 View- PORTABLE-Urgent (02.23.18 @ 22:10) >  IMPRESSION:   No focal consolidation.    < end of copied text >    IMPRESSION:    Rx:

## 2025-03-19 NOTE — CONSULT NOTE ADULT - SUBJECTIVE AND OBJECTIVE BOX
Date of consult: 3/19/2025     HPI:  39-year-old AA male, PMH includes: A-fib on Eliquis and metoprolol, LV dysfunction, HTN, HLD, DM2 on metformin, morbid obesity; BIP private car from home to ED complaining of rapid palpitations, general weakness.  Onset yesterday morning incurred while moving furniture, palpitations remain persistent, he also has +MATA, but no SOB at rest.   No chest pain, No LE edema, No Fever/Chills. Mild dry cough.  Patient tachycardic in ED Intake in 120s. No recent travel. No sick contact. He also complain of infection in his left big toe area. He has seen outpatient podiatry , with no improvement.  (19 Mar 2025 16:30)  Podiatry was consulted for  left big toe pain. Patient narrated that he trimmed his toenails about a week ago and has since developed pain to the medial eponychium of the hallux. Pt states he followed his outpatient podiatrist. Patient rates his pain as 6/10. denies chills and fever.            PMH: HTN (hypertension)    HLD (hyperlipidemia)      PSH:No significant past surgical history        Allergies:penicillins (Unknown)      Labs:                          16.7   5.76  )-----------( 221      ( 19 Mar 2025 12:00 )             49.5     WBC Trend  5.76 Date (03-19 @ 12:00)      Chem  03-19    135  |  109[H]  |  24[H]  ----------------------------<  160[H]  4.5   |  22  |  1.57[H]    Ca    8.9      19 Mar 2025 12:00  Mg     1.9     03-19    TPro  7.4  /  Alb  3.3  /  TBili  0.5  /  DBili  x   /  AST  21  /  ALT  32  /  AlkPhos  57  03-19          T(F): 98 (03-19-25 @ 16:45), Max: 98.9 (03-19-25 @ 12:50)  HR: 76 (03-19-25 @ 16:45) (72 - 125)  BP: 131/93 (03-19-25 @ 16:45) (97/68 - 131/93)  RR: 16 (03-19-25 @ 16:45) (11 - 21)  SpO2: 99% (03-19-25 @ 16:45) (97% - 100%)  Wt(kg): --    REVIEW OF SYSTEMS:    CONSTITUTIONAL: No weakness, fevers or chills  EYES: No visual changes  RESPIRATORY: No cough, wheezing; No shortness of breath  CARDIOVASCULAR: No chest pain or palpitations  GASTROINTESTINAL: No abdominal or epigastric pain. No nausea, vomiting; No diarrhea or constipation.   GENITOURINARY: No dysuria, frequency or hematuria  NEUROLOGICAL: No numbness or weakness  SKIN: See physical examination.  All other review of systems is negative unless indicated above    Physical Exam:   Constitutional: NAD, alert;  Lower Extremity Focus  Derm:  Skin warm, dry and supple bilateral.    Left:  small open lesion with hyperkeratotic border at tip of hallux, mild edema, - shahram-wound erythema, scanty serosangenous discharge, not probe to bone.   Vascular: Dorsalis Pedis and Posterior Tibial pulses 1/4.  Capillary re-fill time less then 3 seconds digits 1-5 bilateral.    Neuro: Protective sensation intact to the level of the digits bilateral.  MSK: Muscle strength 5/5 all major muscle groups bilateral.        < from: Xray Toes, Left Foot (03.19.25 @ 15:32) >  ACC: [id number] EXAM:  XR TOE(S) MIN 2 VIEWS LT   ORDERED BY: JUANA ROCHA     PROCEDURE DATE:  03/19/2025          INTERPRETATION:  X-RAY FIRST LEFT TOE    HISTORY:  L 1st toe infection;  r/o osteo.    VIEWS: 3  IMAGES: 3    COMPARISON: None.    FINDINGS:    OSSEOUS STRUCTURES    Fractures:  None.  Other: No osseous erosions or cortical destructive changes.    JOINTS    Joint Space(s):  Maintained.  SOFT TISSUES: Moderate soft tissue swelling of the distal left first toe.   A small dorsal skin ulcer overlies the first distal phalanx.    IMPRESSION:  No osseous erosions or cortical destructive changes to suggest   osteomyelitis.    Moderate soft tissue swelling of the left first toe with small dorsal   ulcer.    --- End of Report ---          HANK MANSFIELD MD; Resident Radiologist  This document has been electronically signed.  CHARLES TABARES DO; Attending Radiologist  This document has been electronically signed. Mar 19 2025  4:30PM    < end of copied text >

## 2025-03-19 NOTE — ED STATDOCS - OBJECTIVE STATEMENT
40 y/o M with PMHx of Afib on Eliquis, HTN , HLD  presents to the ED c/o fatigue, cough , decreased appetite. 4 days ago, pt states he had vomiting and diarrhea. Then pt felt fine for the next day. On 3/17, pt woke up feeling fatigue and had a cough. Denies n/v/d currently. Denies blood in cough. urinary symptoms , testicular pain or swelling. Pt has had Afib since March 2024. Sees Dr Alexandria Kim, , Cardio.

## 2025-03-19 NOTE — H&P ADULT - NSHPREVIEWOFSYSTEMS_GEN_ALL_CORE
Gen: No fever, chills, weakness  ENT: No visual changes or throat pain  Neck: No pain or stiffness  Respiratory: No cough or wheezing  Cardiovascular: No chest pain, ++ palpitations  Gastrointestinal: No abdominal pain, nausea, vomiting, constipation, or diarrhea  Hematologic: No easy bleeding or bruising  Neurologic: No numbness or focal weakness  Psych: No depression or insomnia  Skin: No rash or itching

## 2025-03-19 NOTE — ED PROVIDER NOTE - CRITICAL CARE ATTENDING CONTRIBUTION TO CARE
IV cardiac medication for rate control of rapid Atrial Fibrillation w/ close cardiac & hemodynamic monitoring.

## 2025-03-19 NOTE — ED STATDOCS - CLINICAL SUMMARY MEDICAL DECISION MAKING FREE TEXT BOX
40 y/o M with hx of Afib on Eliquis with n/v/d improved. Now with cough, fatigue. Screen EKG , CXR , basic labs. Re-eval closely.

## 2025-03-19 NOTE — ED ADULT NURSE NOTE - OBJECTIVE STATEMENT
pt c/o palpitations. HX of AFIB and cardioversion. HR bouncing from 100-130bpm. Pt A&ox4, no s/s of distress. On Eliquis and metoprolol. Monitor and ekg upon arrival. Endorses left big toe foot infection s/p cutting toe nails.

## 2025-03-19 NOTE — ED PROVIDER NOTE - CARE PLAN
Principal Discharge DX:	Paroxysmal atrial fibrillation with RVR  Secondary Diagnosis:	Infection of great toe   1

## 2025-03-19 NOTE — PATIENT PROFILE ADULT - HOME ACCESSIBILITY CONCERNS
Caller: Ember Sutton    Relationship: Self    Best call back number: [phone]    What orders are you requesting (i.e. lab or imaging): REFERRAL FOR MRI AND CAT SCAN    In what timeframe would the patient need to come in: AS SOON AS POSSIBLE    Where will you receive your lab/imaging services:     Additional notes: PATIENT HAD GONE TO THE ER AT [facility].  PATIENT STATED THAT SHE HAD SAT IN THE WAITING ROOM FROM 6PM UNTIL 1 AM THIS MORNING BUT NEVER WAS SEEN.  PATIENT LEFT DUE TO HAVING TO WORK THIS MORNING.          
I spoke with Pt, Pt states that she went to [facility] emergency room. The last one she did not even get called back. Pt stated that she waited from 6pm - 1am. Never got called back to be seen so she left due to having to be at work at 6am.    Pt is asking if we can order these and then she go across the street to conduct? Please advise?  
Patient needed to be evaluated and assessed in the emergency room.  This was discussed in detail yesterday.  Thank you Monserrat Arechiga, APRN
Pt was advised again. Pt verbally understood.  
none

## 2025-03-20 ENCOUNTER — RESULT REVIEW (OUTPATIENT)
Age: 40
End: 2025-03-20

## 2025-03-20 DIAGNOSIS — I48.0 PAROXYSMAL ATRIAL FIBRILLATION: ICD-10-CM

## 2025-03-20 DIAGNOSIS — I51.89 OTHER ILL-DEFINED HEART DISEASES: ICD-10-CM

## 2025-03-20 LAB
A1C WITH ESTIMATED AVERAGE GLUCOSE RESULT: 7.8 % — HIGH (ref 4–5.6)
A1C WITH ESTIMATED AVERAGE GLUCOSE RESULT: 7.8 % — HIGH (ref 4–5.6)
ALBUMIN SERPL ELPH-MCNC: 3 G/DL — LOW (ref 3.3–5)
ALBUMIN SERPL ELPH-MCNC: 3.1 G/DL — LOW (ref 3.3–5)
ALP SERPL-CCNC: 46 U/L — SIGNIFICANT CHANGE UP (ref 40–120)
ALP SERPL-CCNC: 60 U/L — SIGNIFICANT CHANGE UP (ref 40–120)
ALT FLD-CCNC: 29 U/L — SIGNIFICANT CHANGE UP (ref 12–78)
ALT FLD-CCNC: 36 U/L — SIGNIFICANT CHANGE UP (ref 12–78)
ANION GAP SERPL CALC-SCNC: 5 MMOL/L — SIGNIFICANT CHANGE UP (ref 5–17)
AST SERPL-CCNC: 11 U/L — LOW (ref 15–37)
AST SERPL-CCNC: 19 U/L — SIGNIFICANT CHANGE UP (ref 15–37)
BASOPHILS # BLD AUTO: 0.02 K/UL — SIGNIFICANT CHANGE UP (ref 0–0.2)
BASOPHILS # BLD MANUAL: 0 K/UL — SIGNIFICANT CHANGE UP (ref 0–0.2)
BASOPHILS NFR BLD AUTO: 0.4 % — SIGNIFICANT CHANGE UP (ref 0–2)
BASOPHILS NFR BLD MANUAL: 0 % — SIGNIFICANT CHANGE UP (ref 0–2)
BILIRUB SERPL-MCNC: 0.2 MG/DL — SIGNIFICANT CHANGE UP (ref 0.2–1.2)
BILIRUB SERPL-MCNC: 0.5 MG/DL — SIGNIFICANT CHANGE UP (ref 0.2–1.2)
BUN SERPL-MCNC: 25 MG/DL — HIGH (ref 7–23)
BUN SERPL-MCNC: 26 MG/DL — HIGH (ref 7–23)
CALCIUM SERPL-MCNC: 8.9 MG/DL — SIGNIFICANT CHANGE UP (ref 8.5–10.1)
CHLORIDE SERPL-SCNC: 111 MMOL/L — HIGH (ref 96–108)
CHLORIDE SERPL-SCNC: 111 MMOL/L — HIGH (ref 96–108)
CO2 SERPL-SCNC: 22 MMOL/L — SIGNIFICANT CHANGE UP (ref 22–31)
CO2 SERPL-SCNC: 22 MMOL/L — SIGNIFICANT CHANGE UP (ref 22–31)
CREAT SERPL-MCNC: 1.52 MG/DL — HIGH (ref 0.5–1.3)
CREAT SERPL-MCNC: 1.54 MG/DL — HIGH (ref 0.5–1.3)
EGFR: 59 ML/MIN/1.73M2 — LOW
EGFR: 59 ML/MIN/1.73M2 — LOW
EGFR: 60 ML/MIN/1.73M2 — SIGNIFICANT CHANGE UP
EGFR: 60 ML/MIN/1.73M2 — SIGNIFICANT CHANGE UP
EOSINOPHIL # BLD AUTO: 0.11 K/UL — SIGNIFICANT CHANGE UP (ref 0–0.5)
EOSINOPHIL # BLD MANUAL: 0.14 K/UL — SIGNIFICANT CHANGE UP (ref 0–0.5)
EOSINOPHIL NFR BLD AUTO: 2.3 % — SIGNIFICANT CHANGE UP (ref 0–6)
EOSINOPHIL NFR BLD MANUAL: 3 % — SIGNIFICANT CHANGE UP (ref 0–6)
ESTIMATED AVERAGE GLUCOSE: 177 MG/DL — HIGH (ref 68–114)
ESTIMATED AVERAGE GLUCOSE: 177 MG/DL — HIGH (ref 68–114)
GLUCOSE BLDC GLUCOMTR-MCNC: 115 MG/DL — HIGH (ref 70–99)
GLUCOSE BLDC GLUCOMTR-MCNC: 121 MG/DL — HIGH (ref 70–99)
GLUCOSE BLDC GLUCOMTR-MCNC: 134 MG/DL — HIGH (ref 70–99)
GLUCOSE BLDC GLUCOMTR-MCNC: 146 MG/DL — HIGH (ref 70–99)
GLUCOSE BLDC GLUCOMTR-MCNC: 156 MG/DL — HIGH (ref 70–99)
GLUCOSE SERPL-MCNC: 152 MG/DL — HIGH (ref 70–99)
HGB BLD-MCNC: 15.6 G/DL — SIGNIFICANT CHANGE UP (ref 13–17)
IMM GRANULOCYTES # BLD AUTO: 0.03 K/UL — SIGNIFICANT CHANGE UP (ref 0–0.07)
IMM GRANULOCYTES NFR BLD AUTO: 0.6 % — SIGNIFICANT CHANGE UP (ref 0–0.9)
LYMPHOCYTES # BLD AUTO: 1.26 K/UL — SIGNIFICANT CHANGE UP (ref 1–3.3)
LYMPHOCYTES # BLD MANUAL: 1.28 K/UL — SIGNIFICANT CHANGE UP (ref 1–3.3)
LYMPHOCYTES NFR BLD AUTO: 26.5 % — SIGNIFICANT CHANGE UP (ref 13–44)
LYMPHOCYTES NFR BLD MANUAL: 27 % — SIGNIFICANT CHANGE UP (ref 13–44)
MAGNESIUM SERPL-MCNC: 2 MG/DL — SIGNIFICANT CHANGE UP (ref 1.6–2.6)
MAGNESIUM SERPL-MCNC: 2 MG/DL — SIGNIFICANT CHANGE UP (ref 1.6–2.6)
MANUAL SMEAR VERIFICATION: SIGNIFICANT CHANGE UP
MCHC RBC-ENTMCNC: 31.2 PG — SIGNIFICANT CHANGE UP (ref 27–34)
MCHC RBC-ENTMCNC: 34 G/DL — SIGNIFICANT CHANGE UP (ref 32–36)
MCV RBC AUTO: 91.8 FL — SIGNIFICANT CHANGE UP (ref 80–100)
MONOCYTES # BLD AUTO: 1.02 K/UL — HIGH (ref 0–0.9)
MONOCYTES NFR BLD AUTO: 21.5 % — HIGH (ref 2–14)
MONOCYTES NFR BLD MANUAL: 18 % — HIGH (ref 2–14)
NEUTROPHILS # BLD AUTO: 2.31 K/UL — SIGNIFICANT CHANGE UP (ref 1.8–7.4)
NEUTROPHILS # BLD MANUAL: 2.47 K/UL — SIGNIFICANT CHANGE UP (ref 1.8–7.4)
NEUTROPHILS NFR BLD MANUAL: 52 % — SIGNIFICANT CHANGE UP (ref 43–77)
NRBC # BLD AUTO: 0 K/UL — SIGNIFICANT CHANGE UP (ref 0–0)
NRBC BLD AUTO-RTO: 0 /100 WBCS — SIGNIFICANT CHANGE UP (ref 0–0)
PHOSPHATE SERPL-MCNC: 3.5 MG/DL — SIGNIFICANT CHANGE UP (ref 2.5–4.5)
PLAT MORPH BLD: NORMAL — SIGNIFICANT CHANGE UP
PLATELET # BLD AUTO: 195 K/UL — SIGNIFICANT CHANGE UP (ref 150–400)
PMV BLD: 11.9 FL — SIGNIFICANT CHANGE UP (ref 7–13)
POTASSIUM SERPL-MCNC: 4.2 MMOL/L — SIGNIFICANT CHANGE UP (ref 3.5–5.3)
POTASSIUM SERPL-MCNC: 4.3 MMOL/L — SIGNIFICANT CHANGE UP (ref 3.5–5.3)
POTASSIUM SERPL-SCNC: 4.2 MMOL/L — SIGNIFICANT CHANGE UP (ref 3.5–5.3)
POTASSIUM SERPL-SCNC: 4.3 MMOL/L — SIGNIFICANT CHANGE UP (ref 3.5–5.3)
PROT SERPL-MCNC: 6.6 GM/DL — SIGNIFICANT CHANGE UP (ref 6–8.3)
PROT SERPL-MCNC: 6.7 GM/DL — SIGNIFICANT CHANGE UP (ref 6–8.3)
RBC # BLD: 5 M/UL — SIGNIFICANT CHANGE UP (ref 4.2–5.8)
RBC # FLD: 15.1 % — HIGH (ref 10.3–14.5)
RBC BLD AUTO: NORMAL — SIGNIFICANT CHANGE UP
SODIUM SERPL-SCNC: 138 MMOL/L — SIGNIFICANT CHANGE UP (ref 135–145)
SODIUM SERPL-SCNC: 139 MMOL/L — SIGNIFICANT CHANGE UP (ref 135–145)
WBC # BLD: 4.75 K/UL — SIGNIFICANT CHANGE UP (ref 3.8–10.5)
WBC # FLD AUTO: 4.75 K/UL — SIGNIFICANT CHANGE UP (ref 3.8–10.5)
WBC MORPHOLOGY: NORMAL — SIGNIFICANT CHANGE UP

## 2025-03-20 PROCEDURE — 93308 TTE F-UP OR LMTD: CPT | Mod: 26

## 2025-03-20 PROCEDURE — 99223 1ST HOSP IP/OBS HIGH 75: CPT

## 2025-03-20 PROCEDURE — 99232 SBSQ HOSP IP/OBS MODERATE 35: CPT

## 2025-03-20 PROCEDURE — 99233 SBSQ HOSP IP/OBS HIGH 50: CPT

## 2025-03-20 PROCEDURE — 93010 ELECTROCARDIOGRAM REPORT: CPT

## 2025-03-20 RX ORDER — CEFTRIAXONE 500 MG/1
2000 INJECTION, POWDER, FOR SOLUTION INTRAMUSCULAR; INTRAVENOUS EVERY 24 HOURS
Refills: 0 | Status: DISCONTINUED | OUTPATIENT
Start: 2025-03-20 | End: 2025-03-21

## 2025-03-20 RX ORDER — METOPROLOL SUCCINATE 50 MG/1
5 TABLET, EXTENDED RELEASE ORAL EVERY 6 HOURS
Refills: 0 | Status: DISCONTINUED | OUTPATIENT
Start: 2025-03-20 | End: 2025-03-21

## 2025-03-20 RX ADMIN — CEFTRIAXONE 2000 MILLIGRAM(S): 500 INJECTION, POWDER, FOR SOLUTION INTRAMUSCULAR; INTRAVENOUS at 22:08

## 2025-03-20 RX ADMIN — SACUBITRIL AND VALSARTAN 1 TABLET(S): 49; 51 TABLET, FILM COATED ORAL at 22:09

## 2025-03-20 RX ADMIN — APIXABAN 5 MILLIGRAM(S): 2.5 TABLET, FILM COATED ORAL at 22:09

## 2025-03-20 RX ADMIN — SACUBITRIL AND VALSARTAN 1 TABLET(S): 49; 51 TABLET, FILM COATED ORAL at 09:48

## 2025-03-20 RX ADMIN — Medication 1000 UNIT(S): at 09:47

## 2025-03-20 RX ADMIN — CYANOCOBALAMIN 1000 MICROGRAM(S): 1000 INJECTION INTRAMUSCULAR; SUBCUTANEOUS at 09:48

## 2025-03-20 RX ADMIN — Medication 500 MILLIGRAM(S): at 09:47

## 2025-03-20 RX ADMIN — DOFETILIDE 250 MICROGRAM(S): 0.5 CAPSULE ORAL at 22:17

## 2025-03-20 RX ADMIN — DOFETILIDE 250 MICROGRAM(S): 0.5 CAPSULE ORAL at 11:08

## 2025-03-20 RX ADMIN — METOPROLOL SUCCINATE 100 MILLIGRAM(S): 50 TABLET, EXTENDED RELEASE ORAL at 09:47

## 2025-03-20 RX ADMIN — Medication 25 MILLIGRAM(S): at 09:47

## 2025-03-20 RX ADMIN — METOPROLOL SUCCINATE 5 MILLIGRAM(S): 50 TABLET, EXTENDED RELEASE ORAL at 22:08

## 2025-03-20 RX ADMIN — APIXABAN 5 MILLIGRAM(S): 2.5 TABLET, FILM COATED ORAL at 09:47

## 2025-03-20 NOTE — PROGRESS NOTE ADULT - ASSESSMENT
A/P:    A fib RVR  -s/p IV metoprolol and IV cardizem  -continue Metoroprolol 100 mg daily   -Tikosyn 250 mcg bid  -cardizem drip for HR>100  -Eliquis 5 mg BID  -follow cardiology consult    Left Big toe ulcer  - X ray report- no osteomyelitis   -Podiatry consult done  -IV Ceftriaxone   -ID consult done    DM  -hold metformin  -ISS  -DM diet  -follow Dxt    h/o cardiomyopathy  -clinically better  -Last Echo, EF>50%  -Entresto  -Metoprolol  -Aldactone    PAO  -CPAP at night    Eliquis for DVTppx    Full code

## 2025-03-20 NOTE — CONSULT NOTE ADULT - SUBJECTIVE AND OBJECTIVE BOX
CHIEF COMPLAINT: Patient is a 39y old  Male who presents with a chief complaint of A fib RVR (20 Mar 2025 14:01)      HPI:  39-year-old AA male, PMH includes: A-fib on Eliquis and metoprolol, LV dysfunction, HTN, HLD, DM2 on metformin, morbid obesity; BIP private car from home to ED complaining of rapid palpitations, general weakness.  Onset yesterday morning incurred while moving furniture, palpitations remain persistent, he also has +MATA, but no SOB at rest.   No chest pain, No LE edema, No Fever/Chills. Mild dry cough.  Patient tachycardic in ED Intake in 120s. No recent travel. No sick contact. He also complain of infection in his left big toe area. He has seen outpatient podiatry , with no improvement.  (19 Mar 2025 16:30)    Cardiology consulted for Afib with RVR. His cardiologist is Joe Dumotn.     PAST MEDICAL & SURGICAL HISTORY:  HTN (hypertension)  HLD (hyperlipidemia)  No significant past surgical history      SOCIAL HISTORY:   Alcohol: Denied  Smoking: Nonsmoker  Drug Use: Denied  Marital Status:     FAMILY HISTORY: FAMILY HISTORY:  FH: CAD (coronary artery disease)  FH: type 2 diabetes  FH: HTN (hypertension)    Home Medications:  ascorbic acid 500 mg oral tablet: 1 tab(s) orally once a day (19 Mar 2025 15:20)  cholecalciferol 25 mcg (1000 intl units) oral tablet: 1 tab(s) orally once a day (19 Mar 2025 15:20)  Co-Q10 100 mg oral capsule: 1 cap(s) orally once a day (19 Mar 2025 15:20)  cyanocobalamin 1000 mcg oral tablet: 1 tab(s) orally once a day (19 Mar 2025 15:20)  Entresto 97 mg-103 mg oral tablet: 1 tab(s) orally 2 times a day (19 Mar 2025 16:22)  metFORMIN 500 mg oral tablet: 2 tab(s) orally 2 times a day (19 Mar 2025 15:20)  spironolactone 25 mg oral tablet: 1 tab(s) orally once a day (19 Mar 2025 15:20)      MEDICATIONS  (STANDING):  apixaban 5 milliGRAM(s) Oral every 12 hours  ascorbic acid 500 milliGRAM(s) Oral daily  cefTRIAXone Injectable. 2000 milliGRAM(s) IV Push every 24 hours  cholecalciferol 1000 Unit(s) Oral daily  cyanocobalamin 1000 MICROGram(s) Oral daily  dextrose 5%. 1000 milliLiter(s) (50 mL/Hr) IV Continuous <Continuous>  dextrose 5%. 1000 milliLiter(s) (100 mL/Hr) IV Continuous <Continuous>  dextrose 50% Injectable 25 Gram(s) IV Push once  dextrose 50% Injectable 12.5 Gram(s) IV Push once  dextrose 50% Injectable 25 Gram(s) IV Push once  dofetilide. 250 MICROGram(s) Oral every 12 hours  glucagon  Injectable 1 milliGRAM(s) IntraMuscular once  insulin lispro (ADMELOG) corrective regimen sliding scale   SubCutaneous three times a day before meals  insulin lispro (ADMELOG) corrective regimen sliding scale   SubCutaneous at bedtime  metoprolol succinate  milliGRAM(s) Oral daily  sacubitril 97 mG/valsartan 103 mG 1 Tablet(s) Oral two times a day  spironolactone 25 milliGRAM(s) Oral daily    MEDICATIONS  (PRN):  acetaminophen     Tablet .. 650 milliGRAM(s) Oral once PRN Temp greater or equal to 38C (100.4F), Mild Pain (1 - 3)  albuterol    90 MICROgram(s) HFA Inhaler 2 Puff(s) Inhalation every 6 hours PRN for shortness of breath and/or wheezing  aluminum hydroxide/magnesium hydroxide/simethicone Suspension 30 milliLiter(s) Oral every 4 hours PRN Dyspepsia  dextrose Oral Gel 15 Gram(s) Oral once PRN Blood Glucose LESS THAN 70 milliGRAM(s)/deciliter  melatonin 3 milliGRAM(s) Oral at bedtime PRN Insomnia  metoprolol tartrate Injectable 5 milliGRAM(s) IV Push every 6 hours PRN HR>140        Allergies - penicillins (Unknown)    REVIEW OF SYSTEMS: negative unless indicated above    VITAL SIGNS:   Vital Signs Last 24 Hrs  T(C): 36.7 (20 Mar 2025 11:52), Max: 37.3 (19 Mar 2025 21:49)  T(F): 98.1 (20 Mar 2025 11:52), Max: 99.1 (19 Mar 2025 21:49)  HR: 86 (20 Mar 2025 11:52) (62 - 123)  BP: 111/69 (20 Mar 2025 11:52) (108/74 - 131/93)  BP(mean): 90 (19 Mar 2025 20:34) (82 - 103)  RR: 18 (20 Mar 2025 11:52) (16 - 21)  SpO2: 98% (20 Mar 2025 11:52) (97% - 100%)    Parameters below as of 20 Mar 2025 11:52  Patient On (Oxygen Delivery Method): room air        I&O's Summary      PHYSICAL EXAM:  Constitutional: NAD, awake and alert  HEENT:  EOMI,  Pupils round, No oral cyanosis.  Pulmonary: Non-labored, breath sounds are clear bilaterally, No wheezing, rales or rhonchi  Cardiovascular: S1 and S2, irregularly irregular   Gastrointestinal: Bowel Sounds present, soft, nontender.   Lymph: No peripheral edema. No cervical lymphadenopathy.  Neurological: Alert, no focal deficits  Skin: No rashes.  Psych:  Mood & affect appropriate    LABS: reviewed                         15.6   4.75  )-----------( 195      ( 20 Mar 2025 07:42 )             45.9                         16.7   5.76  )-----------( 221      ( 19 Mar 2025 12:00 )             49.5     20 Mar 2025 07:42    138    |  111    |  25     ----------------------------<  151    4.3     |  22     |  1.54   19 Mar 2025 12:00    135    |  109    |  24     ----------------------------<  160    4.5     |  22     |  1.57     Ca    8.9        20 Mar 2025 07:42  Ca    8.9        19 Mar 2025 12:00  Mg     2.0       20 Mar 2025 07:42  Mg     1.9       19 Mar 2025 12:00    TPro  6.6    /  Alb  3.1    /  TBili  0.5    /  DBili  x      /  AST  11     /  ALT  29     /  AlkPhos  46     20 Mar 2025 07:42  TPro  7.4    /  Alb  3.3    /  TBili  0.5    /  DBili  x      /  AST  21     /  ALT  32     /  AlkPhos  57     19 Mar 2025 12:00    PT/INR - ( 19 Mar 2025 12:00 )   PT: 14.4 sec;   INR: 1.25 ratio         PTT - ( 19 Mar 2025 12:00 )  PTT:44.1 sec      Radiology/EKG/TTE: reviewed     < from: 12 Lead ECG (03.20.25 @ 10:16) >    Diagnosis Line Atrial fibrillation  Abnormal ECG  Confirmed by KASI DONOVAN MD ([id number]) on 3/20/993264:34:43 AM    < end of copied text >    --------------------------------------------------------------------------------------< from: TTE Limited W or WO Ultrasound Enhancing Agent (08.31.24 @ 10:37) >  CONCLUSIONS:      1. Left ventricular systolic function is moderately decreased with an ejection fraction visually estimated at 35 to 40 %.   2. Unable to accurately assess segmental wall motion abnormalities due to irregular rhythm.   3. No pericardial effusion seen.    ________________________________________________________________________________________    < end of copied text >

## 2025-03-20 NOTE — CONSULT NOTE ADULT - PROBLEM SELECTOR RECOMMENDATION 9
. pt. with Afib with RVR, s/p DCCV x2 (last DCCV 09/2024),  . cont. AC with eliquis, cont. tikosyn 250 mcg po bid, cont. BB, started on cardizem gtt, will dc as pt. had 5 s pauses  . check limited Echo   . plan for DCCV tomorrow with Dr. Kim  . EP  following

## 2025-03-20 NOTE — PROGRESS NOTE ADULT - ASSESSMENT
A: 39-year-old Male seen for the followin. Partial thickness wound to Left Hallux, stable with no clinical signs of infection  2.  Onychocryptosis to Left Hallux  2. Diabetes Mellitus II  3.  Pain to Left foot (resolved)      P:   Chart reviewed and Patient evaluated;  Discussed diagnosis and treatment with patient. Discussed importance of daily foot examinations, proper shoe gear, importance of tight glycemic control.   X-rays reviewed : on wet read showing no soft tissue emphysema and  cortical erosion   S/P Partial Toenail avulsion (Dos 3/19/25) with open wound site, with hyperkeratotic border at tip of hallux, wound bed is granular, mild edema (receding), - shahram-wound erythema, scanty serosangenous discharge (resolved), not probe to bone. Pt is afebrile, has no leukocytosis  Partial toenail avulsion (Dos 3/19/25) --> please see procedure note  Wound probed, exsanguinated and flushed    Applied bacitracin, xeroform with betadine with dry sterile dressing  WBAT  to Left foot in dispensed surgical shoe  All additional care per Med appreciated  No further acute podiatric intervention warranted at this time. Patient can follow Dr Carlos or his private podiatrist within one week after discharge.  Patient demonstrated verbal understanding of all interventions and tolerated interventions well without any complications.   Podiatry will sign off at this time.       Case D/W attending Dr. Carlos   A: 39-year-old Male seen for the followin. Partial thickness wound to Left Hallux, stable with no clinical signs of infection  2.  Onychocryptosis to Left Hallux  2. Diabetes Mellitus II  3.  Pain to Left foot (resolved)      P:   Chart reviewed and Patient evaluated;  Discussed diagnosis and treatment with patient. Discussed importance of daily foot examinations, proper shoe gear, importance of tight glycemic control.   X-rays reviewed : on wet read showing no soft tissue emphysema and  cortical erosion   S/P Partial Toenail avulsion (Dos 3/19/25) with open wound site, with hyperkeratotic border at tip of hallux, wound bed is granular, mild edema (receding), - shahram-wound erythema, scanty serosangenous discharge (resolved), not probe to bone. Pt is afebrile, has no leukocytosis  Partial toenail avulsion (Dos 3/19/25) --> please see procedure note  Wound probed, exsanguinated and flushed    Applied bacitracin, xeroform with betadine with dry sterile dressing  WBAT  to Left foot in dispensed surgical shoe  All additional care per Med appreciated  No further acute podiatric intervention warranted at this time. Patient can follow Dr Carlos or his private podiatrist within one week after discharge.  Patient demonstrated verbal understanding of all interventions and tolerated interventions well without any complications.   Podiatry will sign off at this time with nursing order. Please re-consult when needed.      Case D/W attending Dr. Carlos      Wound care order for Left Big toe wound to be done Every other day  1. Please remove old dressing  2. Apply bacitracin, cover with sterile gauze, wrap with morgan and secure with tape  Thank you.

## 2025-03-20 NOTE — CONSULT NOTE ADULT - ASSESSMENT
Assessment:  39M with A-fib on Eliquis and metoprolol, LV dysfunction, HTN, HLD, DM2 on metformin, morbid obesity presents 3/19 with palpitation, admitted for Afib RVR  Patient also reports L hallux wound for 1 week, mild swelling and pain, denies any fever or chills  Afebrile on admission, on RA  No leukocytosis  UA negative  RVP negative  XR with No osseous erosions or cortical destructive changes to suggest osteomyelitis.  Moderate soft tissue swelling of the left first toe with small dorsal ulcer.    Antimicrobials:  cefTRIAXone Injectable. 1000 every 24 hours (3/19 --- )    Impression:   #L Hallux Wound, Possible Cellulitis  #Afib RVR  #PCN Allergy, but does not recall any history of allergic reaction, tolerating CTX    Recommendations:  - continue empiric CTX 2G q24 (Day #2)  - monitor temperature curve  - trend WBC  - reason for abx use reviewed with patient  - side effects of antibiotic discussed, tolerating abx well so far  - Prior cultures reviewed. An epidemiologic assessment was performed. There is a significant risk for resistant microorganisms to spread to family members, and/or healthcare staff. Isolation precautions based on infection control policy. Will reconsider further isolation measures based on new culture results and other clinical data as appropriate Appropriate cultures collected and an appropriate broad spectrum antibiotic therapy will be considered  - follow up BCx x2  - if continues to improve clinically, can transition to PO antibiotic once ready for discharge, Cefuroxime 500mg q12 to complete 7-day course  - Podiatry appreciated; local care  - rest per primary team    Clinical team may change from intravenous to oral antibiotics when the following criteria are met:   1. Patient is clinically improving/stable       a)	Improved signs and symptoms of infection from initial presentation       b)	Afebrile for 24 hours       c)	Leukocytosis trending towards normal range   2. Patient is tolerating oral intake   3. Initial/repeat blood cultures are negative OR do not need to wait for preliminary blood cultures to result    When above criteria met may change iv antibiotics to an oral agent as above

## 2025-03-20 NOTE — PROGRESS NOTE ADULT - ATTENDING COMMENTS
s/p partial nail avulsion left mild cellulitis clinically improving no pain  educated patient   He is to follow up within 1 week of discharge  Advise PO abx such as cefadroxil if cellulitis persists   Higher risk of complication due to diabetes  I educated him on diabetes management as it pertain to foot care    Follow up with me within 1 week

## 2025-03-20 NOTE — PROGRESS NOTE ADULT - TIME BILLING
- Reviewing, and interpreting labs and testing.  - Independently obtaining a review of systems and performing a physical exam  - Reviewing consultant documentation/recommendations in addition to discussing plan of care with consultants.  - Counselling and educating patient and family regarding interpretation of aforementioned items and plan of care.
discussing plan of care with resident and patient

## 2025-03-20 NOTE — PROGRESS NOTE ADULT - ASSESSMENT
39 yr old male with above PMHx admitted yesterday complaining of rapid palpitations, generalweakness.  Onset yesterday morning incurred while moving furniture, palps remain persistent, +MATA, but no SOB at rest.  He missed a dose or two of medications during N/V/D over the weekend.   Found to have AF with RVR, hypotensive at times, he also had infected left great toe, seen by podiatry.  No osteomyelitis, BC neg thus far.  Awaiting repeat echo results, his last out patient LVEF was 50-55 in Jan 2025 improved from 35-40%.  He is better rate controlled after IV cardizem drip. Mild MARIANELA.  Last DCCV in Nov 2024, he was initiated on Tikosyn 250mcg PO BID at that time.      A/P:  AF with RVR  Continue telemetry monitoring  Continue tikosyn 250mcg PO BID  Continue uninterrupted Eliquis  Continue BB, monitor for hypotension  Cardizem may be used for rate control with improved EF 50-55% on last TTE  Limited repeat TTE to assess LVEF, results pending  NPO after MN for SERA/DCCV tomorrow with Julio Dumont  Keep lytes replaced: K>4.0, Mg>2.0  Avoid QT prolongation medications while on tikosyn*  Discussed possible Afib ablation in future, pt needs to lose weight.  Plan discussed with pt., Dr. Abraham and cardio team

## 2025-03-20 NOTE — CONSULT NOTE ADULT - NS ATTEND AMEND GEN_ALL_CORE FT
This is a 39-year-old man with hypertension, diabetes, morbid obesity who presents with atrial fibrillation with a rapid ventricular response.  He developed a GI viral syndrome as well as an infection in his left foot prior to these events he reports that he is compliant with his dofetilide Eliquis he has a history of a past cardiomyopathy felt to be rate related.  His most recent echo showed recovery of up to 50 to 55%.  Recommend continuing dofetilide adding a Cardizem drip for rate control and will likely need a cardioversion if he does not spontaneously convert.
Plan of care discussed with NP. AGree with plan of care.

## 2025-03-20 NOTE — CONSULT NOTE ADULT - PROBLEM SELECTOR RECOMMENDATION 2
. last TTE 01/2025 with EF 50-55%, had previously moderate-severe LV dysfunction (LVEF 35-40%) on 9/24  [facility]; Recommendations: limited Echo, continue GDMT with BB, ARNI and MRA - monitor creatinine - at present 1.5

## 2025-03-20 NOTE — CONSULT NOTE ADULT - SUBJECTIVE AND OBJECTIVE BOX
Patient is a 39y old  Male who presents with a chief complaint of A fib RVR (20 Mar 2025 08:27)    HPI:  39-year-old AA male, PMH includes: A-fib on Eliquis and metoprolol, LV dysfunction, HTN, HLD, DM2 on metformin, morbid obesity; BIP private car from home to ED complaining of rapid palpitations, general weakness.  Onset yesterday morning incurred while moving furniture, palpitations remain persistent, he also has +MATA, but no SOB at rest.   No chest pain, No LE edema, No Fever/Chills. Mild dry cough.  Patient tachycardic in ED Intake in 120s. No recent travel. No sick contact. He also complain of infection in his left big toe area. He has seen outpatient podiatry , with no improvement.  (19 Mar 2025 16:30)  Above HPI reviewed and reconciled with patient    39M with A-fib on Eliquis and metoprolol, LV dysfunction, HTN, HLD, DM2 on metformin, morbid obesity presents 3/19 with palpitation, admitted for Afib RVR  Patient also reports L hallux wound for 1 week, mild swelling and pain, denies any fever or chills  Afebrile on admission, on RA  No leukocytosis  UA negative  RVP negative  XR with No osseous erosions or cortical destructive changes to suggest osteomyelitis.  Moderate soft tissue swelling of the left first toe with small dorsal ulcer.    PAST MEDICAL & SURGICAL HISTORY:  HTN (hypertension)      HLD (hyperlipidemia)      No significant past surgical history        FAMILY HISTORY:  FH: CAD (coronary artery disease)    FH: type 2 diabetes    FH: HTN (hypertension)      Social Hx: Denies alcohol, tobacco, recreational drug use    Allergies  penicillins (Unknown)    ANTIMICROBIALS (past 90 days)  MEDICATIONS  (STANDING):  cefTRIAXone Injectable.   1000 milliGRAM(s) IV Push (03-19-25 @ 15:54)    cefTRIAXone Injectable.   1000 milliGRAM(s) IV Push (03-19-25 @ 23:18)    vancomycin  IVPB   250 mL/Hr IV Intermittent (03-19-25 @ 19:08)        ACTIVE ANTIMICROBIALS  cefTRIAXone Injectable. 1000 every 24 hours (3/19 --- )    MEDICATIONS  (STANDING):  acetaminophen     Tablet .. 650 once PRN  albuterol    90 MICROgram(s) HFA Inhaler 2 every 6 hours PRN  aluminum hydroxide/magnesium hydroxide/simethicone Suspension 30 every 4 hours PRN  apixaban 5 every 12 hours  dextrose 50% Injectable 25 once  dextrose 50% Injectable 12.5 once  dextrose 50% Injectable 25 once  dextrose Oral Gel 15 once PRN  diltiazem Infusion 5 <Continuous>  dofetilide. 250 every 12 hours  glucagon  Injectable 1 once  insulin lispro (ADMELOG) corrective regimen sliding scale  three times a day before meals  insulin lispro (ADMELOG) corrective regimen sliding scale  at bedtime  melatonin 3 at bedtime PRN  metoprolol succinate  daily  ondansetron Injectable 4 every 8 hours PRN  sacubitril 97 mG/valsartan 103 mG 1 two times a day  spironolactone 25 daily      REVIEW OF SYSTEMS  [  ] ROS unobtainable because:    [ x ] All other systems negative except as noted below:	    Constitutional:  [ ] fever [ ] chills  [ ] weight loss  [ ] weakness  Skin:  [ ] rash [ ] phlebitis	  Eyes: [ ] icterus [ ] pain  [ ] discharge	  ENMT: [ ] sore throat  [ ] thrush [ ] ulcers [ ] exudates  Respiratory: [ ] dyspnea [ ] hemoptysis [ ] cough [ ] sputum	  Cardiovascular:  [ ] chest pain [ ] palpitations [ ] edema	  Gastrointestinal:  [ ] nausea [ ] vomiting [ ] diarrhea [ ] constipation [ ] pain	  Genitourinary:  [ ] dysuria [ ] frequency [ ] hematuria [ ] discharge [ ] flank pain  [ ] incontinence  Musculoskeletal:  [ ] myalgias [ ] arthralgias [ ] arthritis  [ ] back pain  Neurological:  [ ] headache [ ] seizures  [ ] confusion/altered mental status  Psychiatric:  [ ] anxiety [ ] depression	  Hematology/Lymphatics:  [ ] lymphadenopathy  Endocrine:  [ ] adrenal [ ] thyroid  Allergic/Immunologic:	 [ ] transplant [ ] seasonal    Vital Signs Last 24 Hrs  T(C): 36.7 (20 Mar 2025 08:43), Max: 37.3 (19 Mar 2025 21:49)  T(F): 98 (20 Mar 2025 08:43), Max: 99.1 (19 Mar 2025 21:49)  HR: 62 (20 Mar 2025 08:43) (62 - 125)  BP: 126/63 (20 Mar 2025 08:43) (97/68 - 131/93)  BP(mean): 90 (19 Mar 2025 20:34) (82 - 103)  RR: 18 (20 Mar 2025 08:43) (11 - 21)  SpO2: 97% (20 Mar 2025 08:43) (97% - 100%)    Parameters below as of 20 Mar 2025 08:43  Patient On (Oxygen Delivery Method): room air        Physical Exam:  Constitutional: Obese, NAD  Head/Eyes: no icterus  LUNGS:  CTA  CVS:  regular rhythm  Abd:  soft, non-tender; non-distended  Ext:  L hallux wound with mild periwound erythema  Vascular:  IV site no erythema tenderness or discharge  Neuro: AAO X 3, non- focal    Labs: all available labs reviewed                        15.6   4.75  )-----------( 195      ( 20 Mar 2025 07:42 )             45.9     03-20    138  |  111[H]  |  25[H]  ----------------------------<  151[H]  4.3   |  22  |  1.54[H]    Ca    8.9      20 Mar 2025 07:42  Mg     2.0     03-20    TPro  6.6  /  Alb  3.1[L]  /  TBili  0.5  /  DBili  x   /  AST  11[L]  /  ALT  29  /  AlkPhos  46  03-20     LIVER FUNCTIONS - ( 20 Mar 2025 07:42 )  Alb: 3.1 g/dL / Pro: 6.6 gm/dL / ALK PHOS: 46 U/L / ALT: 29 U/L / AST: 11 U/L / GGT: x           Urinalysis Basic - ( 20 Mar 2025 07:42 )    Color: x / Appearance: x / SG: x / pH: x  Gluc: 151 mg/dL / Ketone: x  / Bili: x / Urobili: x   Blood: x / Protein: x / Nitrite: x   Leuk Esterase: x / RBC: x / WBC x   Sq Epi: x / Non Sq Epi: x / Bacteria: x          Radiology: all available radiological tests reviewed    Advanced directives addressed: full resuscitation

## 2025-03-21 ENCOUNTER — TRANSCRIPTION ENCOUNTER (OUTPATIENT)
Age: 40
End: 2025-03-21

## 2025-03-21 ENCOUNTER — RESULT REVIEW (OUTPATIENT)
Age: 40
End: 2025-03-21

## 2025-03-21 VITALS
TEMPERATURE: 98 F | DIASTOLIC BLOOD PRESSURE: 72 MMHG | SYSTOLIC BLOOD PRESSURE: 124 MMHG | OXYGEN SATURATION: 98 % | RESPIRATION RATE: 17 BRPM | HEART RATE: 87 BPM

## 2025-03-21 LAB
ADD ON TEST-SPECIMEN IN LAB: SIGNIFICANT CHANGE UP
ANION GAP SERPL CALC-SCNC: 5 MMOL/L — SIGNIFICANT CHANGE UP (ref 5–17)
BUN SERPL-MCNC: 21 MG/DL — SIGNIFICANT CHANGE UP (ref 7–23)
CALCIUM SERPL-MCNC: 9.1 MG/DL — SIGNIFICANT CHANGE UP (ref 8.5–10.1)
CHLORIDE SERPL-SCNC: 110 MMOL/L — HIGH (ref 96–108)
CO2 SERPL-SCNC: 19 MMOL/L — LOW (ref 22–31)
CREAT SERPL-MCNC: 1.5 MG/DL — HIGH (ref 0.5–1.3)
EGFR: 61 ML/MIN/1.73M2 — SIGNIFICANT CHANGE UP
EGFR: 61 ML/MIN/1.73M2 — SIGNIFICANT CHANGE UP
GLUCOSE BLDC GLUCOMTR-MCNC: 131 MG/DL — HIGH (ref 70–99)
GLUCOSE BLDC GLUCOMTR-MCNC: 136 MG/DL — HIGH (ref 70–99)
GLUCOSE SERPL-MCNC: 151 MG/DL — HIGH (ref 70–99)
MAGNESIUM SERPL-MCNC: 2.2 MG/DL — SIGNIFICANT CHANGE UP (ref 1.6–2.6)
POTASSIUM SERPL-MCNC: 4.8 MMOL/L — SIGNIFICANT CHANGE UP (ref 3.5–5.3)
POTASSIUM SERPL-SCNC: 4.8 MMOL/L — SIGNIFICANT CHANGE UP (ref 3.5–5.3)
SODIUM SERPL-SCNC: 134 MMOL/L — LOW (ref 135–145)

## 2025-03-21 PROCEDURE — 93010 ELECTROCARDIOGRAM REPORT: CPT

## 2025-03-21 PROCEDURE — 92960 CARDIOVERSION ELECTRIC EXT: CPT

## 2025-03-21 PROCEDURE — 99233 SBSQ HOSP IP/OBS HIGH 50: CPT | Mod: 25

## 2025-03-21 PROCEDURE — 93320 DOPPLER ECHO COMPLETE: CPT | Mod: 26

## 2025-03-21 PROCEDURE — 99239 HOSP IP/OBS DSCHRG MGMT >30: CPT

## 2025-03-21 PROCEDURE — 93312 ECHO TRANSESOPHAGEAL: CPT | Mod: 26

## 2025-03-21 PROCEDURE — 93325 DOPPLER ECHO COLOR FLOW MAPG: CPT | Mod: 26

## 2025-03-21 RX ORDER — DIPHENHYDRAMINE HCL 12.5MG/5ML
25 ELIXIR ORAL ONCE
Refills: 0 | Status: COMPLETED | OUTPATIENT
Start: 2025-03-21 | End: 2025-03-21

## 2025-03-21 RX ORDER — DEXTROMETHORPHAN HBR, GUAIFENESIN 200 MG/10ML
200 LIQUID ORAL EVERY 6 HOURS
Refills: 0 | Status: DISCONTINUED | OUTPATIENT
Start: 2025-03-21 | End: 2025-03-21

## 2025-03-21 RX ORDER — FLUTICASONE PROPIONATE 50 UG/1
1 SPRAY, METERED NASAL
Qty: 1 | Refills: 0
Start: 2025-03-21

## 2025-03-21 RX ORDER — FLUTICASONE PROPIONATE 50 UG/1
1 SPRAY, METERED NASAL
Refills: 0 | Status: DISCONTINUED | OUTPATIENT
Start: 2025-03-21 | End: 2025-03-21

## 2025-03-21 RX ORDER — CEFUROXIME SODIUM 1.5 G
1 VIAL (EA) INJECTION
Qty: 14 | Refills: 0
Start: 2025-03-21 | End: 2025-03-27

## 2025-03-21 RX ADMIN — Medication 25 MILLIGRAM(S): at 17:09

## 2025-03-21 RX ADMIN — METOPROLOL SUCCINATE 100 MILLIGRAM(S): 50 TABLET, EXTENDED RELEASE ORAL at 17:08

## 2025-03-21 RX ADMIN — APIXABAN 5 MILLIGRAM(S): 2.5 TABLET, FILM COATED ORAL at 09:59

## 2025-03-21 RX ADMIN — Medication 10 MILLIGRAM(S): at 17:10

## 2025-03-21 RX ADMIN — SACUBITRIL AND VALSARTAN 1 TABLET(S): 49; 51 TABLET, FILM COATED ORAL at 10:02

## 2025-03-21 RX ADMIN — DEXTROMETHORPHAN HBR, GUAIFENESIN 200 MILLIGRAM(S): 200 LIQUID ORAL at 01:05

## 2025-03-21 RX ADMIN — FLUTICASONE PROPIONATE 1 SPRAY(S): 50 SPRAY, METERED NASAL at 10:02

## 2025-03-21 NOTE — PROCEDURAL SAFETY CHECKLIST WITH OR WITHOUT SEDATION - NSPOSTCOMMENTFT_GEN_ALL_CORE
pt prepped for procedure as per policy. probe in: bubbles: probe out: pt prepped for procedure as per policy. probe in: 1142 probe out: 1145 DCCV at 200J x1 @ 1144

## 2025-03-21 NOTE — DISCHARGE NOTE NURSING/CASE MANAGEMENT/SOCIAL WORK - NSDCPEEMAIL_GEN_ALL_CORE
[facility] email [email or web address]

## 2025-03-21 NOTE — CHART NOTE - NSCHARTNOTEFT_GEN_A_CORE
PROCEDURE NOTE: SERA/DCCV  INDICATION: Atrial fibrillation with RVR   Pt tolerated procedure well without complications.    SERA showed no evidence of right atrial, left atrial or left atrial appendage thrombus  s/p 200J shock x 1 with successful conversion to NSR.

## 2025-03-21 NOTE — DISCHARGE NOTE PROVIDER - NSDCCPCAREPLAN_GEN_ALL_CORE_FT
PRINCIPAL DISCHARGE DIAGNOSIS  Diagnosis: Paroxysmal atrial fibrillation with RVR  Assessment and Plan of Treatment: Abnormal Heart Rythym leading to more inefficient blood flow at higher heart rates. Medications adjusted to control heart rate. Take medications as prescribed. In addition, with atrial fibrillation, increased risk of developing clots in the heart which can travel to the rest of the body and cause trouble (example is stroke). In order to prevent blood clots, you are on a blood thinner that helps prevent blood clot formation. Take medication as prescribed.   Being on a blood thinner will make cuts bleeding quicker/easier/longer and can cause bruising. Maintain prolonged pressure on any bleeding cuts. Caution and avoid trauma as major and minor bleeding can be potentiated by being on a blood thinner. If you notice any prolonged/persistent bleeding, seek medical attention.   Continue strict compliance with CPAP for obstructive sleep apnea  Recommend aggressive weight loss, life style modifications, diet and exercise. Follow up with your primary medical doctor.      SECONDARY DISCHARGE DIAGNOSES  Diagnosis: Infection of great toe  Assessment and Plan of Treatment: Complete course of oral  Continue local wound care and follow up with your podiatrist      Diagnosis: Diabetes mellitus  Assessment and Plan of Treatment: The blood test we use to check for diabetes is called A1c. Value equal to or greater than 6.5% gives the diagnosis of diabetes. Value at and between 5.7% and 6.4% is considered to be prediabetes. Your blood test resulted with 7.4% which means you have more room for improvement in the control of your diabetes  Continue current medications as instructed/prescribed and maintain low carbohydrate diet and follow up with your primary medical doctor.

## 2025-03-21 NOTE — PROGRESS NOTE ADULT - PROBLEM SELECTOR PLAN 1
. s/p successful SERA/DCCV today 3/21  . Limited TTE with preserved LVEF 50%  . cont. AC with eliquis, cont. tikosyn 250 mcg po bid, cont. BB  . MCOT on DC (EP)    stable from cardiac standpoint, op follow up with Dr. Kim, will sign off
Afib with RVR, s/p DCCV x2 (last DCCV 09/2024); now repeat successful cardioversion to NSR today on 3/21  cont AC with eliquis, toprol 100mg and dofetilide 250mcg BID   stable for discharge; will f/u with him in office in 1-2 weeks

## 2025-03-21 NOTE — DISCHARGE NOTE PROVIDER - CARE PROVIDER_API CALL
CAMILO RODRIGUEZ, [facility]  [address] [place] [address]  Phone: [phone]  Fax: [phone]  Established Patient  Follow Up Time: 1 week

## 2025-03-21 NOTE — PROGRESS NOTE ADULT - ASSESSMENT
39 yr old male with above PMHx admitted yesterday complaining of rapid palpitations, generalweakness.  Onset yesterday morning incurred while moving furniture, palps remain persistent, +MATA, but no SOB at rest.  He missed a dose or two of medications during N/V/D over the weekend.   Found to have AF with RVR, hypotensive at times, he also had infected left great toe, seen by podiatry.  No osteomyelitis, BC neg thus far.  Awaiting repeat echo results, his last out patient LVEF was 50-55 in Jan 2025 improved from 35-40%.  He is better rate controlled after IV cardizem drip. Mild MARIANELA.  Last DCCV in Nov 2024, he was initiated on Tikosyn 250mcg PO BID at that time.      A/P:  AF with RVR on Tikosyn, now s/p SERA/DCCV today, he is in sinus rhythm.  Continue telemetry   Continue tikosyn 250mcg PO BID  Continue uninterrupted Eliquis  Continue Toprol XL 100mg PO BID  Repeat TTE LVEF 50%  Discussed compliance with CPAP  Avoid QT prolongation medications while on tikosyn*  Will place 4 week Zio AT monitor to further do rhythm surveillance with F/U  as outpatient in EP office.  Discussed possible Afib ablation in future discussed healthy lifestyle, weight loss.  He tried Zepbound but did not like how he felt, he will follow up with PMD/cardiology to discuss alternate agent if he is a candidate.  EP will sign off

## 2025-03-21 NOTE — PROVIDER CONTACT NOTE (OTHER) - ACTION/TREATMENT ORDERED:
David Kaur NP notified. PRN lopressor and standing order Tikosyn given.
David Kaur NP notified. No new orders at this time. Cardiac monitoring ongoing.

## 2025-03-21 NOTE — DISCHARGE NOTE NURSING/CASE MANAGEMENT/SOCIAL WORK - NSDCPEWEB_GEN_ALL_CORE

## 2025-03-21 NOTE — PACU DISCHARGE NOTE - COMMENTS
pr discharged to , report given to Susy FANG. discharge instructions explained to pt, all questions and concerns addressed. transported to  via stretcher on tele monitor.

## 2025-03-21 NOTE — DISCHARGE NOTE NURSING/CASE MANAGEMENT/SOCIAL WORK - PATIENT PORTAL LINK FT
You can access the FollowMyHealth Patient Portal offered by [facility] by registering at the following website: [email or web address]. By joining [employer]’s FollowMyHealth portal, you will also be able to view your health information using other applications (apps) compatible with our system.

## 2025-03-21 NOTE — DISCHARGE NOTE PROVIDER - POSTFACE STATEMENT FOR MINUTES SPENT
Problem: Patient Care Overview  Goal: Plan of Care Review  Outcome: Ongoing (interventions implemented as appropriate)  Plan of care reviewed. Working on breastfeeding.has supplemented  with formula Lactation nurse consulted. voiding and stooling. Vital sign stable.color pink ,no distress. TCB at high intermediate range . Will repeat in 12 hrs.       minutes on the discharge service.

## 2025-03-21 NOTE — PROGRESS NOTE ADULT - PROBLEM SELECTOR PLAN 2
. last TTE 01/2025 with EF 50-55%, had previously moderate-severe LV dysfunction (LVEF 35-40%) on 9/24  . limited TTE with preserved LVEF 50%  . continue GDMT with BB, ARNI and MRA - monitor creatinine - at present 1.5
LVEF (initially 35-40%) now recovered  euvolemic; GDMT: cont entresto, spironolactone, toprol

## 2025-03-21 NOTE — DISCHARGE NOTE PROVIDER - NSDCMRMEDTOKEN_GEN_ALL_CORE_FT
albuterol 90 mcg/inh inhalation aerosol: 2 puff(s) inhaled every 6 hours as needed for  shortness of breath and/or wheezing  apixaban 5 mg oral tablet: 1 tab(s) orally every 12 hours  ascorbic acid 500 mg oral tablet: 1 tab(s) orally once a day  cefuroxime 500 mg oral tablet: 1 tab(s) orally every 12 hours  cholecalciferol 25 mcg (1000 intl units) oral tablet: 1 tab(s) orally once a day  Co-Q10 100 mg oral capsule: 1 cap(s) orally once a day  cyanocobalamin 1000 mcg oral tablet: 1 tab(s) orally once a day  dofetilide 250 mcg oral capsule: 1 cap(s) orally every 12 hours  Entresto 97 mg-103 mg oral tablet: 1 tab(s) orally 2 times a day  Flonase 50 mcg/inh nasal spray: 1 spray(s) nasal 2 times a day  metFORMIN 500 mg oral tablet: 2 tab(s) orally 2 times a day  Metoprolol Succinate  mg oral tablet, extended release: 1 tab(s) orally once a day  spironolactone 25 mg oral tablet: 1 tab(s) orally once a day

## 2025-03-21 NOTE — PROGRESS NOTE ADULT - NS ATTEND AMEND GEN_ALL_CORE FT
Recurrent persistent atrial fibrillation with probable inconsistent anticoagulation.   for SERA/CV. Long term plan would be weight loss to allow catheter ablation.
Plan of care discussed with NP. Agree with plan of care.

## 2025-03-21 NOTE — DISCHARGE NOTE PROVIDER - NSDCFUSCHEDAPPT_GEN_ALL_CORE_FT
Alexandria Kim  [facility]  CARDIOLOGY [address]  Scheduled Appointment: 04/14/2025    Tea Lai  [facility]  ELECTROPH [address]  Scheduled Appointment: 04/15/2025

## 2025-03-21 NOTE — DISCHARGE NOTE PROVIDER - HOSPITAL COURSE
FROM H&P:    "39-year-old AA male, PMH includes: A-fib on Eliquis and metoprolol, LV dysfunction, HTN, HLD, DM2 on metformin, morbid obesity; BIP private car from home to ED complaining of rapid palpitations, general weakness.  Onset yesterday morning incurred while moving furniture, palpitations remain persistent, he also has +MATA, but no SOB at rest.   No chest pain, No LE edema, No Fever/Chills. Mild dry cough.  Patient tachycardic in ED Intake in 120s. No recent travel. No sick contact. He also complain of infection in his left big toe area. He has seen outpatient podiatry , with no improvement. "    A/P:    Paroxysmal A fib RVR  -s/p IV metoprolol and IV cardizem  -continue Metoroprolol 100 mg daily   -Tikosyn 250 mcg bid  -cardizem drip for HR>100->dc'ed for pauses  -Eliquis 5 mg BID  -follow cardiology consult  -s/p SERA CV-->NSR (3/21)    Left Big toe ulcer  - X ray report- no osteomyelitis   -Podiatry consult done-> outpatient follow up.   -IV Ceftriaxone   -ID consult done->cleared for PO ceftin x 7 days.     DM  -hold metformin  -ISS  -DM diet  -follow Dxt    h/o cardiomyopathy  -clinically better  -Last Echo, EF>50%  -Entresto  -Metoprolol  -Aldactone    PAO  -CPAP at night    Eliquis for DVTppx    Asymptomatic. Cleared by cardiology and EP. PO antibiotics and outpatient follow with podiatry for toe avulsion ulcer. Ziopatch at discharge    T(C): 36.9 (03-21-25 @ 16:13), Max: 36.9 (03-21-25 @ 16:13)  HR: 87 (03-21-25 @ 16:13) (71 - 146)  BP: 124/72 (03-21-25 @ 16:13) (110/82 - 131/96)  RR: 17 (03-21-25 @ 16:13) (15 - 18)  SpO2: 98% (03-21-25 @ 16:13) (94% - 100%)  AAOx3; NAD; Morbid obesity  No JVD  RRR  Lungs cta bilaterally; normal respiratory effort  Abdomien obese; soft, non-tender  Toe ulcer with dressing; lcear and dry  LE edema  Discahrge Managemnet: 37 minutes  Date of Discharge/Service: 3/21/2025

## 2025-03-21 NOTE — DISCHARGE NOTE NURSING/CASE MANAGEMENT/SOCIAL WORK - FINANCIAL ASSISTANCE
[facility] provides services at a reduced cost to those who are determined to be eligible through [facility]’s financial assistance program. Information regarding [facility]’s financial assistance program can be found by going to [email or web address] or by calling [phone].

## 2025-03-21 NOTE — PROVIDER CONTACT NOTE (OTHER) - SITUATION
RN notified by telemetry technician that patient had a 4.4s pause.
patient tachycardic, HR going up to 150s

## 2025-03-21 NOTE — DISCHARGE NOTE PROVIDER - NSDCCAREPROVSEEN_GEN_ALL_CORE_FT
Thomas, Edis Mcintosh, Felisa Dominguez, Belinda Kim, Alexandria Kaur, David Carlos, Jesus Aguilar, Jennifer Abraham, Dada Ritter, Gil Barton, Bebe Arguello, Dada Navarro, Olegario

## 2025-03-21 NOTE — PROGRESS NOTE ADULT - SUBJECTIVE AND OBJECTIVE BOX
39-year-old AA male, PMH includes: A-fib on Eliquis and metoprolol, LV dysfunction, HTN, HLD, DM2 on metformin, morbid obesity; BIP private car from home to ED complaining of rapid palpitations, general  weakness.  Onset yesterday morning incurred while moving furniture, palps remain persistent, +MATA, but no SOB at rest.   No chest pain, LE edema, F/C.  3/15: + 24-hour N/V/D: self resolved but residual mild  general weakness.  Mild dry cough.   Patient tachycardic in ED Intake in 120s, expedited to Main ED for eval and further management.  2/2024: HH admitted for rapid A-fib, required electrical cardioversion.  Cardio: Leigh  & Julio     03/19: EPS team consulted patient in the ED. Pt. reports onset of N/V/D occurred 4 days ago and resolved within 24-48 hrs, after physical exertion yesterday he felt "winded" with palpitations and checked his apple watch to see that his HR was elevated. Patient reports feeling mild palpitations now with current -140s on telemetry, denies cp, sob, mata, n/v/d, abdominal discomfort, febrile, diaphoresis, lightheadedness, dizziness, syncope. Pt. endorses exposure to sick family contacts with the flu in the past few days. Pt. also reports  a newly infected left great toe for 1 week from cutting his nails with reported discharge, has been unable to schedule a podiatry visit. Pt. denies recent ETOH use and endorses occasional tobacco (1-2 cigarettes/day). Pt. reports general compliance with medications, however endorses missing 1-2 doses of his medications over the weekend with GI symptoms N/V/D.  Tele: AF w/ RVR. Frequent PVCs, ventricular couplets/triplets, 4 beat runs of NSVT. HR: 100-150s  EKG: AF w/ RVR with PVC or aberrantly conducted complexes. Vent. Rate: 120 bpm, QRS: 92ms, QTc: 424ms      3/20/25: Pt seen sitting up in bed in NAD, he does not have CP or SOB, dizziness, occasionally he feels mild palpitations.  HR better controlled today, he had some manuelito and IV cardizem was discontinued.  TELE: atrial fib rates 's, PVCs, brief V-triplets      3/21/25:  pt seen sitting up in bed s/p successful SERA/CV today, he is in SR rate 80-90 bpm.  Denies any c/o.  TELE: SR 80-90 bpm, overnight AF with RVR's to 150-160 bpm  EKG: s/p DCCV: NSR 90 bpm, GA 152ms, QRS 90ms, QTc 455ms      MEDICATIONS  (STANDING):  apixaban 5 milliGRAM(s) Oral every 12 hours  ascorbic acid 500 milliGRAM(s) Oral daily  cefTRIAXone Injectable. 2000 milliGRAM(s) IV Push every 24 hours  cholecalciferol 1000 Unit(s) Oral daily  cyanocobalamin 1000 MICROGram(s) Oral daily  dextrose 5%. 1000 milliLiter(s) (50 mL/Hr) IV Continuous <Continuous>  dextrose 5%. 1000 milliLiter(s) (100 mL/Hr) IV Continuous <Continuous>  dextrose 50% Injectable 25 Gram(s) IV Push once  dextrose 50% Injectable 12.5 Gram(s) IV Push once  dextrose 50% Injectable 25 Gram(s) IV Push once  dofetilide. 250 MICROGram(s) Oral every 12 hours  fluticasone propionate 50 MICROgram(s)/spray Nasal Spray 1 Spray(s) Both Nostrils two times a day  glucagon  Injectable 1 milliGRAM(s) IntraMuscular once  insulin lispro (ADMELOG) corrective regimen sliding scale   SubCutaneous three times a day before meals  insulin lispro (ADMELOG) corrective regimen sliding scale   SubCutaneous at bedtime  metoprolol succinate  milliGRAM(s) Oral daily  sacubitril 97 mG/valsartan 103 mG 1 Tablet(s) Oral two times a day  spironolactone 25 milliGRAM(s) Oral daily    MEDICATIONS  (PRN):  acetaminophen     Tablet .. 650 milliGRAM(s) Oral once PRN Temp greater or equal to 38C (100.4F), Mild Pain (1 - 3)  albuterol    90 MICROgram(s) HFA Inhaler 2 Puff(s) Inhalation every 6 hours PRN for shortness of breath and/or wheezing  aluminum hydroxide/magnesium hydroxide/simethicone Suspension 30 milliLiter(s) Oral every 4 hours PRN Dyspepsia  dextrose Oral Gel 15 Gram(s) Oral once PRN Blood Glucose LESS THAN 70 milliGRAM(s)/deciliter  guaiFENesin Oral Liquid (Sugar-Free) 200 milliGRAM(s) Oral every 6 hours PRN Cough  melatonin 3 milliGRAM(s) Oral at bedtime PRN Insomnia  metoprolol tartrate Injectable 5 milliGRAM(s) IV Push every 6 hours PRN HR>140    Allergies    penicillins (Unknown)    Intolerances    Vital Signs Last 24 Hrs  T(C): 36.7 (21 Mar 2025 13:15), Max: 36.7 (20 Mar 2025 15:59)  T(F): 98.1 (21 Mar 2025 13:15), Max: 98.1 (21 Mar 2025 08:18)  HR: 88 (21 Mar 2025 13:15) (71 - 146)  BP: 131/96 (21 Mar 2025 13:15) (110/82 - 131/96)  BP(mean): --  RR: 16 (21 Mar 2025 13:15) (15 - 18)  SpO2: 100% (21 Mar 2025 13:15) (94% - 100%)    Parameters below as of 21 Mar 2025 13:15  Patient On (Oxygen Delivery Method): room air                          15.6   4.75  )-----------( 195      ( 20 Mar 2025 07:42 )             45.9     03-21    134[L]  |  110[H]  |  21  ----------------------------<  151[H]  4.8   |  19[L]  |  1.50[H]    Ca    9.1      21 Mar 2025 07:45  Phos  3.5     03-20  Mg     2.2     03-21    TPro  6.7  /  Alb  3.0[L]  /  TBili  0.2  /  DBili  x   /  AST  19  /  ALT  36  /  AlkPhos  60  03-20      Culture - Blood (collected 19 Mar 2025 15:11)  Source: Blood None  Preliminary Report (20 Mar 2025 20:01):    No growth at 24 hours    Culture - Blood (collected 19 Mar 2025 15:09)  Source: Blood None  Preliminary Report (20 Mar 2025 20:01):    No growth at 24 hours    Urinalysis with Rflx Culture (collected 19 Mar 2025 14:50)      REVIEW OF SYSTEMS:    CONSTITUTIONAL: No weakness, fevers or chills  EYES/ENT: No visual changes;  No vertigo or throat pain   NECK: No pain or stiffness  RESPIRATORY: No cough, wheezing, hemoptysis; No shortness of breath  CARDIOVASCULAR: No chest pain or palpitations  GASTROINTESTINAL: No abdominal or epigastric pain. No nausea, vomiting, or hematemesis; No diarrhea or constipation. No melena or hematochezia.  GENITOURINARY: No dysuria, frequency or hematuria  NEUROLOGICAL: No numbness or weakness  SKIN: No itching, burning, rashes, or lesions   All other review of systems is negative unless indicated above    PHYSICAL EXAM:    General: In NAD, obese  Neurology: A&Ox3, nonfocal, SUAREZ x 4  HEENT: NC/AT, neck supple, no JVD, EOMI, PERRL  Respiratory: CTA B/L  CV:  RRR, S1S2, no murmurs, rubs or gallops  Abdominal: Soft, NT, ND +BS  Extremities: No edema, + peripheral pulses.  Left great toe wound, dsg C/D/I.  Skin: No rashes      < from: SERA W or WO Ultrasound Enhancing Agent (03.21.25 @ 10:37) >  CONCLUSIONS:      1. Focused SERA performed in setting severe PAO.   2. No thrombus seen in the left atrial, left atrial appendage, right atrial or right atrial appendage.   3. The patient was successfully cardioverted to sinus rhythm.      < from: TTE Limited W or WO Ultrasound Enhancing Agent (03.20.25 @ 07:44) >  CONCLUSIONS:      1. Left ventricular systolic function is low normal with an ejection fraction of 50 % by Schroeder's method of disks.   2. Technically difficult image quality.    
  CHIEF COMPLAINT: Patient is a 39y old  Male who presents with a chief complaint of A fib RVR (20 Mar 2025 14:01)      HPI:  39-year-old AA male, PMH includes: A-fib on Eliquis and metoprolol, LV dysfunction, HTN, HLD, DM2 on metformin, morbid obesity; BIP private car from home to ED complaining of rapid palpitations, general weakness.  Onset yesterday morning incurred while moving furniture, palpitations remain persistent, he also has +MATA, but no SOB at rest.   No chest pain, No LE edema, No Fever/Chills. Mild dry cough.  Patient tachycardic in ED Intake in 120s. No recent travel. No sick contact. He also complain of infection in his left big toe area. He has seen outpatient podiatry , with no improvement.  (19 Mar 2025 16:30)    Cardiology consulted for Afib with RVR. His cardiologist is Joe Dumont.     3/21/25: s/p successful SERA/DCCV    Home Medications:  ascorbic acid 500 mg oral tablet: 1 tab(s) orally once a day (19 Mar 2025 15:20)  cholecalciferol 25 mcg (1000 intl units) oral tablet: 1 tab(s) orally once a day (19 Mar 2025 15:20)  Co-Q10 100 mg oral capsule: 1 cap(s) orally once a day (19 Mar 2025 15:20)  cyanocobalamin 1000 mcg oral tablet: 1 tab(s) orally once a day (19 Mar 2025 15:20)  Entresto 97 mg-103 mg oral tablet: 1 tab(s) orally 2 times a day (19 Mar 2025 16:22)  metFORMIN 500 mg oral tablet: 2 tab(s) orally 2 times a day (19 Mar 2025 15:20)  spironolactone 25 mg oral tablet: 1 tab(s) orally once a day (19 Mar 2025 15:20)      MEDICATIONS  (STANDING):  apixaban 5 milliGRAM(s) Oral every 12 hours  ascorbic acid 500 milliGRAM(s) Oral daily  cefTRIAXone Injectable. 2000 milliGRAM(s) IV Push every 24 hours  cholecalciferol 1000 Unit(s) Oral daily  cyanocobalamin 1000 MICROGram(s) Oral daily  dextrose 5%. 1000 milliLiter(s) (50 mL/Hr) IV Continuous <Continuous>  dextrose 5%. 1000 milliLiter(s) (100 mL/Hr) IV Continuous <Continuous>  dextrose 50% Injectable 25 Gram(s) IV Push once  dextrose 50% Injectable 12.5 Gram(s) IV Push once  dextrose 50% Injectable 25 Gram(s) IV Push once  dofetilide. 250 MICROGram(s) Oral every 12 hours  glucagon  Injectable 1 milliGRAM(s) IntraMuscular once  insulin lispro (ADMELOG) corrective regimen sliding scale   SubCutaneous three times a day before meals  insulin lispro (ADMELOG) corrective regimen sliding scale   SubCutaneous at bedtime  metoprolol succinate  milliGRAM(s) Oral daily  sacubitril 97 mG/valsartan 103 mG 1 Tablet(s) Oral two times a day  spironolactone 25 milliGRAM(s) Oral daily    MEDICATIONS  (PRN):  acetaminophen     Tablet .. 650 milliGRAM(s) Oral once PRN Temp greater or equal to 38C (100.4F), Mild Pain (1 - 3)  albuterol    90 MICROgram(s) HFA Inhaler 2 Puff(s) Inhalation every 6 hours PRN for shortness of breath and/or wheezing  aluminum hydroxide/magnesium hydroxide/simethicone Suspension 30 milliLiter(s) Oral every 4 hours PRN Dyspepsia  dextrose Oral Gel 15 Gram(s) Oral once PRN Blood Glucose LESS THAN 70 milliGRAM(s)/deciliter  melatonin 3 milliGRAM(s) Oral at bedtime PRN Insomnia  metoprolol tartrate Injectable 5 milliGRAM(s) IV Push every 6 hours PRN HR>140    Vital Signs Last 24 Hrs  T(C): 36.3 (21 Mar 2025 12:10), Max: 36.7 (20 Mar 2025 15:59)  T(F): 97.4 (21 Mar 2025 12:10), Max: 98.1 (21 Mar 2025 08:18)  HR: 79 (21 Mar 2025 12:30) (71 - 146)  BP: 124/85 (21 Mar 2025 12:30) (110/82 - 129/97)  BP(mean): --  RR: 15 (21 Mar 2025 12:30) (15 - 18)  SpO2: 98% (21 Mar 2025 12:30) (94% - 99%)    Parameters below as of 21 Mar 2025 12:30  Patient On (Oxygen Delivery Method): room air        PHYSICAL EXAM:  Constitutional: NAD, awake and alert  HEENT:  EOMI,  Pupils round, No oral cyanosis.  Pulmonary: Non-labored, breath sounds are clear bilaterally, No wheezing, rales or rhonchi  Cardiovascular: S1 and S2, irregularly irregular   Gastrointestinal: Bowel Sounds present, soft, nontender.   Lymph: No peripheral edema. No cervical lymphadenopathy.  Neurological: Alert, no focal deficits  Skin: No rashes.  Psych:  Mood & affect appropriate    LABS: reviewed                                      15.6   4.75  )-----------( 195      ( 20 Mar 2025 07:42 )             45.9     03-21    134[L]  |  110[H]  |  21  ----------------------------<  151[H]  4.8   |  19[L]  |  1.50[H]    Ca    9.1      21 Mar 2025 07:45  Phos  3.5     03-20  Mg     2.2     03-21    TPro  6.7  /  Alb  3.0[L]  /  TBili  0.2  /  DBili  x   /  AST  19  /  ALT  36  /  AlkPhos  60  03-20    - TroponinI hsT: <-18.22      Radiology/EKG/TTE: reviewed     < from: TTE Limited W or WO Ultrasound Enhancing Agent (03.20.25 @ 07:44) >  ___     CONCLUSIONS:      1. Left ventricular systolic function is low normal with an ejection fraction of 50 % by Schroeder's method of disks.   2. Technically difficult image quality.    < end of copied text >      < from: 12 Lead ECG (03.20.25 @ 10:16) >    Diagnosis Line Atrial fibrillation  Abnormal ECG  Confirmed by KASI DONOVAN MD ([id number]) on 3/20/409889:34:43 AM    < end of copied text >    --------------------------------------------------------------------------------------< from: TTE Limited W or WO Ultrasound Enhancing Agent (08.31.24 @ 10:37) >  CONCLUSIONS:      1. Left ventricular systolic function is moderately decreased with an ejection fraction visually estimated at 35 to 40 %.   2. Unable to accurately assess segmental wall motion abnormalities due to irregular rhythm.   3. No pericardial effusion seen.    ________________________________________________________________________________________    < end of copied text >        
3/20/2025: Pt was seen by the podiatry team. Pt was resting comfortably bedside. Pain is controlled. No acute event        PMH: HTN (hypertension)    HLD (hyperlipidemia)      PSH:No significant past surgical history        Allergies:penicillins (Unknown)      Labs:                          16.7   5.76  )-----------( 221      ( 19 Mar 2025 12:00 )             49.5     WBC Trend  5.76 Date (03-19 @ 12:00)      Chem  03-19    135  |  109[H]  |  24[H]  ----------------------------<  160[H]  4.5   |  22  |  1.57[H]    Ca    8.9      19 Mar 2025 12:00  Mg     1.9     03-19    TPro  7.4  /  Alb  3.3  /  TBili  0.5  /  DBili  x   /  AST  21  /  ALT  32  /  AlkPhos  57  03-19      Vital Signs Last 24 Hrs  T(C): 36.7 (20 Mar 2025 04:44), Max: 37.3 (19 Mar 2025 21:49)  T(F): 98.1 (20 Mar 2025 04:44), Max: 99.1 (19 Mar 2025 21:49)  HR: 69 (20 Mar 2025 04:44) (64 - 125)  BP: 110/76 (20 Mar 2025 04:44) (97/68 - 131/93)  BP(mean): 90 (19 Mar 2025 20:34) (82 - 103)  RR: 18 (20 Mar 2025 04:44) (11 - 21)  SpO2: 97% (20 Mar 2025 04:44) (97% - 100%)    Parameters below as of 20 Mar 2025 04:44  Patient On (Oxygen Delivery Method): room air        REVIEW OF SYSTEMS:    CONSTITUTIONAL: No weakness, fevers or chills  EYES: No visual changes  RESPIRATORY: No cough, wheezing; No shortness of breath  CARDIOVASCULAR: No chest pain or palpitations  GASTROINTESTINAL: No abdominal or epigastric pain. No nausea, vomiting; No diarrhea or constipation.   GENITOURINARY: No dysuria, frequency or hematuria  NEUROLOGICAL: No numbness or weakness  SKIN: See physical examination.  All other review of systems is negative unless indicated above    Physical Exam:   Constitutional: NAD, alert;  Lower Extremity Focus  Derm:  Skin warm, dry and supple bilateral.    Left: open lesion to the lateral eponychium of hallux toenail secondary to partial toenail avulsion (DoS 3/19/25)  with hyperkeratotic border at tip of hallux, mild edema (receding), wound base is granular, wound size (0.2cm x 0.1cm)- shahram-wound erythema, scanty serosangenous discharge (resolved), not probe to bone.   Vascular: Dorsalis Pedis and Posterior Tibial pulses 1/4.  Capillary re-fill time less then 3 seconds digits 1-5 bilateral.    Neuro: Protective sensation intact to the level of the digits bilateral.  MSK: Muscle strength 5/5 all major muscle groups bilateral.        < from: Xray Toes, Left Foot (03.19.25 @ 15:32) >  ACC: [id number] EXAM:  XR TOE(S) MIN 2 VIEWS LT   ORDERED BY: JUANA ROCHA     PROCEDURE DATE:  03/19/2025          INTERPRETATION:  X-RAY FIRST LEFT TOE    HISTORY:  L 1st toe infection;  r/o osteo.    VIEWS: 3  IMAGES: 3    COMPARISON: None.    FINDINGS:    OSSEOUS STRUCTURES    Fractures:  None.  Other: No osseous erosions or cortical destructive changes.    JOINTS    Joint Space(s):  Maintained.  SOFT TISSUES: Moderate soft tissue swelling of the distal left first toe.   A small dorsal skin ulcer overlies the first distal phalanx.    IMPRESSION:  No osseous erosions or cortical destructive changes to suggest   osteomyelitis.    Moderate soft tissue swelling of the left first toe with small dorsal   ulcer.    --- End of Report ---          HANK MANSFIELD MD; Resident Radiologist  This document has been electronically signed.  CHARLES TABARES DO; Attending Radiologist  This document has been electronically signed. Mar 19 2025  4:30PM    < end of copied text >  
39-year-old AA male, PMH includes: A-fib on Eliquis and metoprolol, LV dysfunction, HTN, HLD, DM2 on metformin, morbid obesity; BIP private car from home to ED complaining of rapid palpitations, general  weakness.  Onset yesterday morning incurred while moving furniture, palps remain persistent, +MATA, but no SOB at rest.   No chest pain, LE edema, F/C.  3/15: + 24-hour N/V/D: self resolved but residual mild  general weakness.  Mild dry cough.   Patient tachycardic in ED Intake in 120s, expedited to Main ED for eval and further management.  2/2024: HH admitted for rapid A-fib, required electrical cardioversion.  Cardio: Leigh  & Julio     03/19: EPS team consulted patient in the ED. Pt. reports onset of N/V/D occurred 4 days ago and resolved within 24-48 hrs, after physical exertion yesterday he felt "winded" with palpitations and checked his apple watch to see that his HR was elevated. Patient reports feeling mild palpitations now with current -140s on telemetry, denies cp, sob, mata, n/v/d, abdominal discomfort, febrile, diaphoresis, lightheadedness, dizziness, syncope. Pt. endorses exposure to sick family contacts with the flu in the past few days. Pt. also reports  a newly infected left great toe for 1 week from cutting his nails with reported discharge, has been unable to schedule a podiatry visit. Pt. denies recent ETOH use and endorses occasional tobacco (1-2 cigarettes/day). Pt. reports general compliance with medications, however endorses missing 1-2 doses of his medications over the weekend with GI symptoms N/V/D.  Tele: AF w/ RVR. Frequent PVCs, ventricular couplets/triplets, 4 beat runs of NSVT. HR: 100-150s  EKG: AF w/ RVR with PVC or aberrantly conducted complexes. Vent. Rate: 120 bpm, QRS: 92ms, QTc: 424ms      3/20/25: Pt seen sitting up in bed in NAD, he does not have CP or SOB, dizziness, occasionally he feels mild palpitations.  HR better controlled today, he had some manuelito and IV cardizem was discontinued.  TELE: atrial fib rates 's, PVCs, brief V-triplets      MEDICATIONS  (STANDING):  apixaban 5 milliGRAM(s) Oral every 12 hours  ascorbic acid 500 milliGRAM(s) Oral daily  cefTRIAXone Injectable. 2000 milliGRAM(s) IV Push every 24 hours  cholecalciferol 1000 Unit(s) Oral daily  cyanocobalamin 1000 MICROGram(s) Oral daily  dextrose 5%. 1000 milliLiter(s) (50 mL/Hr) IV Continuous <Continuous>  dextrose 5%. 1000 milliLiter(s) (100 mL/Hr) IV Continuous <Continuous>  dextrose 50% Injectable 25 Gram(s) IV Push once  dextrose 50% Injectable 12.5 Gram(s) IV Push once  dextrose 50% Injectable 25 Gram(s) IV Push once  dofetilide. 250 MICROGram(s) Oral every 12 hours  glucagon  Injectable 1 milliGRAM(s) IntraMuscular once  insulin lispro (ADMELOG) corrective regimen sliding scale   SubCutaneous three times a day before meals  insulin lispro (ADMELOG) corrective regimen sliding scale   SubCutaneous at bedtime  metoprolol succinate  milliGRAM(s) Oral daily  sacubitril 97 mG/valsartan 103 mG 1 Tablet(s) Oral two times a day  spironolactone 25 milliGRAM(s) Oral daily    MEDICATIONS  (PRN):  acetaminophen     Tablet .. 650 milliGRAM(s) Oral once PRN Temp greater or equal to 38C (100.4F), Mild Pain (1 - 3)  albuterol    90 MICROgram(s) HFA Inhaler 2 Puff(s) Inhalation every 6 hours PRN for shortness of breath and/or wheezing  aluminum hydroxide/magnesium hydroxide/simethicone Suspension 30 milliLiter(s) Oral every 4 hours PRN Dyspepsia  dextrose Oral Gel 15 Gram(s) Oral once PRN Blood Glucose LESS THAN 70 milliGRAM(s)/deciliter  melatonin 3 milliGRAM(s) Oral at bedtime PRN Insomnia  metoprolol tartrate Injectable 5 milliGRAM(s) IV Push every 6 hours PRN HR>140    Allergies    penicillins (Unknown)    Intolerances    Vital Signs Last 24 Hrs  T(C): 36.7 (20 Mar 2025 15:59), Max: 37.3 (19 Mar 2025 21:49)  T(F): 98 (20 Mar 2025 15:59), Max: 99.1 (19 Mar 2025 21:49)  HR: 97 (20 Mar 2025 15:59) (62 - 115)  BP: 129/78 (20 Mar 2025 15:59) (109/59 - 131/93)  BP(mean): 90 (19 Mar 2025 20:34) (82 - 103)  RR: 18 (20 Mar 2025 15:59) (16 - 21)  SpO2: 98% (20 Mar 2025 15:59) (97% - 100%)    Parameters below as of 20 Mar 2025 15:59  Patient On (Oxygen Delivery Method): room air    REVIEW OF SYSTEMS:    CONSTITUTIONAL: No weakness, fevers or chills  EYES/ENT: No visual changes;  No vertigo or throat pain   NECK: No pain or stiffness  RESPIRATORY: No cough, wheezing, hemoptysis; No shortness of breath  CARDIOVASCULAR: No chest pain or palpitations  GASTROINTESTINAL: No abdominal or epigastric pain. No nausea, vomiting, or hematemesis; No diarrhea or constipation. No melena or hematochezia.  GENITOURINARY: No dysuria, frequency or hematuria  NEUROLOGICAL: No numbness or weakness  SKIN: No itching, burning, rashes, or lesions   All other review of systems is negative unless indicated above    PHYSICAL EXAM:    General: In NAD, obese  Neurology: A&Ox3, nonfocal, SUAREZ x 4  HEENT: NC/AT, neck supple, no JVD, EOMI, PERRL  Respiratory: CTA B/L  CV: Irregular S1S2, no murmurs, rubs or gallops  Abdominal: Soft, NT, ND +BS  Extremities: No edema, + peripheral pulses.  Left great toe wound, dsg C/D/I.  Skin: No rashes                          15.6   4.75  )-----------( 195      ( 20 Mar 2025 07:42 )             45.9     03-20    138  |  111[H]  |  25[H]  ----------------------------<  151[H]  4.3   |  22  |  1.54[H]    Ca    8.9      20 Mar 2025 07:42  Mg     2.0     03-20    TPro  6.6  /  Alb  3.1[L]  /  TBili  0.5  /  DBili  x   /  AST  11[L]  /  ALT  29  /  AlkPhos  46  03-20        Urinalysis with Rflx Culture (collected 19 Mar 2025 14:50)    < from: Xray Chest 1 View- PORTABLE-Urgent (Xray Chest 1 View- PORTABLE-Urgent .) (03.19.25 @ 12:29) >  IMPRESSION: Limited inspiration portable exam accentuates perihilar   interstitial markings. No focal infiltrate. Heart is at the upper limits   of normal in its transthoracic diameter. Regional osseous structures   appropriate for age. Follow-up suggested as indicated clinically. Some   elevation of the right hemidiaphragm noted.      Awaiting repeat limited 2D echo results.  
  CHIEF COMPLAINT: palpitations       HPI:  38 yo male with a hx paroxysmal atrial fibrillation s/p SERA/DCCV, HFrEF (LVEF 40% based on 5/2024 TTE now recovered), HTN, asthma, sleep apnea (non-adherent w/ CPAP), dyslipidemia presenting with palpitations due to Afib with RVR.     3/21: s/p SERA/DCCV to NSR            PAST MEDICAL & SURGICAL HISTORY:  HTN (hypertension)  HLD (hyperlipidemia)  No significant past surgical history      SOCIAL HISTORY:   Alcohol: Denied  Smoking: Nonsmoker  Drug Use: Denied  Marital Status:     FAMILY HISTORY: FAMILY HISTORY:  FH: CAD (coronary artery disease)  FH: type 2 diabetes  FH: HTN (hypertension)    Home Medications:  ascorbic acid 500 mg oral tablet: 1 tab(s) orally once a day (19 Mar 2025 15:20)  cholecalciferol 25 mcg (1000 intl units) oral tablet: 1 tab(s) orally once a day (19 Mar 2025 15:20)  Co-Q10 100 mg oral capsule: 1 cap(s) orally once a day (19 Mar 2025 15:20)  cyanocobalamin 1000 mcg oral tablet: 1 tab(s) orally once a day (19 Mar 2025 15:20)  Entresto 97 mg-103 mg oral tablet: 1 tab(s) orally 2 times a day (19 Mar 2025 16:22)  metFORMIN 500 mg oral tablet: 2 tab(s) orally 2 times a day (19 Mar 2025 15:20)  spironolactone 25 mg oral tablet: 1 tab(s) orally once a day (19 Mar 2025 15:20)      MEDICATIONS  (STANDING):  apixaban 5 milliGRAM(s) Oral every 12 hours  ascorbic acid 500 milliGRAM(s) Oral daily  cefTRIAXone Injectable. 2000 milliGRAM(s) IV Push every 24 hours  cholecalciferol 1000 Unit(s) Oral daily  cyanocobalamin 1000 MICROGram(s) Oral daily  dextrose 5%. 1000 milliLiter(s) (50 mL/Hr) IV Continuous <Continuous>  dextrose 5%. 1000 milliLiter(s) (100 mL/Hr) IV Continuous <Continuous>  dextrose 50% Injectable 25 Gram(s) IV Push once  dextrose 50% Injectable 12.5 Gram(s) IV Push once  dextrose 50% Injectable 25 Gram(s) IV Push once  dofetilide. 250 MICROGram(s) Oral every 12 hours  glucagon  Injectable 1 milliGRAM(s) IntraMuscular once  insulin lispro (ADMELOG) corrective regimen sliding scale   SubCutaneous three times a day before meals  insulin lispro (ADMELOG) corrective regimen sliding scale   SubCutaneous at bedtime  metoprolol succinate  milliGRAM(s) Oral daily  sacubitril 97 mG/valsartan 103 mG 1 Tablet(s) Oral two times a day  spironolactone 25 milliGRAM(s) Oral daily    MEDICATIONS  (PRN):  acetaminophen     Tablet .. 650 milliGRAM(s) Oral once PRN Temp greater or equal to 38C (100.4F), Mild Pain (1 - 3)  albuterol    90 MICROgram(s) HFA Inhaler 2 Puff(s) Inhalation every 6 hours PRN for shortness of breath and/or wheezing  aluminum hydroxide/magnesium hydroxide/simethicone Suspension 30 milliLiter(s) Oral every 4 hours PRN Dyspepsia  dextrose Oral Gel 15 Gram(s) Oral once PRN Blood Glucose LESS THAN 70 milliGRAM(s)/deciliter  melatonin 3 milliGRAM(s) Oral at bedtime PRN Insomnia  metoprolol tartrate Injectable 5 milliGRAM(s) IV Push every 6 hours PRN HR>140        Allergies - penicillins (Unknown)    REVIEW OF SYSTEMS: negative unless indicated above    VITAL SIGNS:   Vital Signs Last 24 Hrs  T(C): 36.7 (20 Mar 2025 11:52), Max: 37.3 (19 Mar 2025 21:49)  T(F): 98.1 (20 Mar 2025 11:52), Max: 99.1 (19 Mar 2025 21:49)  HR: 86 (20 Mar 2025 11:52) (62 - 123)  BP: 111/69 (20 Mar 2025 11:52) (108/74 - 131/93)  BP(mean): 90 (19 Mar 2025 20:34) (82 - 103)  RR: 18 (20 Mar 2025 11:52) (16 - 21)  SpO2: 98% (20 Mar 2025 11:52) (97% - 100%)    Parameters below as of 20 Mar 2025 11:52  Patient On (Oxygen Delivery Method): room air        I&O's Summary      PHYSICAL EXAM:  Constitutional: NAD, awake and alert   HEENT:  EOMI,  Pupils round, No oral cyanosis.  Pulmonary: Non-labored, breath sounds are clear bilaterally, No wheezing, rales or rhonchi  Cardiovascular: S1 and S2, no murmur   Gastrointestinal: Bowel Sounds present, soft, nontender.   Lymph: No peripheral edema. No cervical lymphadenopathy.  Neurological: Alert, no focal deficits  Skin: No rashes.  Psych:  Mood & affect appropriate    LABS: reviewed                         15.6   4.75  )-----------( 195      ( 20 Mar 2025 07:42 )             45.9                         16.7   5.76  )-----------( 221      ( 19 Mar 2025 12:00 )             49.5     20 Mar 2025 07:42    138    |  111    |  25     ----------------------------<  151    4.3     |  22     |  1.54   19 Mar 2025 12:00    135    |  109    |  24     ----------------------------<  160    4.5     |  22     |  1.57     Ca    8.9        20 Mar 2025 07:42  Ca    8.9        19 Mar 2025 12:00  Mg     2.0       20 Mar 2025 07:42  Mg     1.9       19 Mar 2025 12:00    TPro  6.6    /  Alb  3.1    /  TBili  0.5    /  DBili  x      /  AST  11     /  ALT  29     /  AlkPhos  46     20 Mar 2025 07:42  TPro  7.4    /  Alb  3.3    /  TBili  0.5    /  DBili  x      /  AST  21     /  ALT  32     /  AlkPhos  57     19 Mar 2025 12:00    PT/INR - ( 19 Mar 2025 12:00 )   PT: 14.4 sec;   INR: 1.25 ratio         PTT - ( 19 Mar 2025 12:00 )  PTT:44.1 sec      Radiology/EKG/TTE: reviewed     < from: 12 Lead ECG (03.20.25 @ 10:16) >    Diagnosis Line Atrial fibrillation  Abnormal ECG  Confirmed by KASI DONOVAN MD ([id number]) on 3/20/043231:34:43 AM    < end of copied text >    --------------------------------------------------------------------------------------< from: TTE Limited W or WO Ultrasound Enhancing Agent (08.31.24 @ 10:37) >  CONCLUSIONS:      1. Left ventricular systolic function is moderately decreased with an ejection fraction visually estimated at 35 to 40 %.   2. Unable to accurately assess segmental wall motion abnormalities due to irregular rhythm.   3. No pericardial effusion seen.    ________________________________________________________________________________________    < end of copied text >        
Patient is seen and examined. Chart is reviewed. HR is slowly getting controlled. on cardizem drip and tikosyn.   MEDICATIONS  (STANDING):  apixaban 5 milliGRAM(s) Oral every 12 hours  ascorbic acid 500 milliGRAM(s) Oral daily  cefTRIAXone Injectable. 2000 milliGRAM(s) IV Push every 24 hours  cholecalciferol 1000 Unit(s) Oral daily  cyanocobalamin 1000 MICROGram(s) Oral daily  dextrose 5%. 1000 milliLiter(s) (50 mL/Hr) IV Continuous <Continuous>  dextrose 5%. 1000 milliLiter(s) (100 mL/Hr) IV Continuous <Continuous>  dextrose 50% Injectable 25 Gram(s) IV Push once  dextrose 50% Injectable 12.5 Gram(s) IV Push once  dextrose 50% Injectable 25 Gram(s) IV Push once  diltiazem Infusion 5 mG/Hr (5 mL/Hr) IV Continuous <Continuous>  dofetilide. 250 MICROGram(s) Oral every 12 hours  glucagon  Injectable 1 milliGRAM(s) IntraMuscular once  insulin lispro (ADMELOG) corrective regimen sliding scale   SubCutaneous three times a day before meals  insulin lispro (ADMELOG) corrective regimen sliding scale   SubCutaneous at bedtime  metoprolol succinate  milliGRAM(s) Oral daily  sacubitril 97 mG/valsartan 103 mG 1 Tablet(s) Oral two times a day  spironolactone 25 milliGRAM(s) Oral daily    MEDICATIONS  (PRN):  acetaminophen     Tablet .. 650 milliGRAM(s) Oral once PRN Temp greater or equal to 38C (100.4F), Mild Pain (1 - 3)  albuterol    90 MICROgram(s) HFA Inhaler 2 Puff(s) Inhalation every 6 hours PRN for shortness of breath and/or wheezing  aluminum hydroxide/magnesium hydroxide/simethicone Suspension 30 milliLiter(s) Oral every 4 hours PRN Dyspepsia  dextrose Oral Gel 15 Gram(s) Oral once PRN Blood Glucose LESS THAN 70 milliGRAM(s)/deciliter  melatonin 3 milliGRAM(s) Oral at bedtime PRN Insomnia      Vital Signs Last 24 Hrs  T(C): 36.7 (20 Mar 2025 11:52), Max: 37.3 (19 Mar 2025 21:49)  T(F): 98.1 (20 Mar 2025 11:52), Max: 99.1 (19 Mar 2025 21:49)  HR: 86 (20 Mar 2025 11:52) (62 - 123)  BP: 111/69 (20 Mar 2025 11:52) (108/74 - 131/93)  BP(mean): 90 (19 Mar 2025 20:34) (82 - 103)  RR: 18 (20 Mar 2025 11:52) (16 - 21)  SpO2: 98% (20 Mar 2025 11:52) (97% - 100%)    Parameters below as of 20 Mar 2025 11:52  Patient On (Oxygen Delivery Method): room air        CONSTITUTIONAL: Well groomed, no apparent distress, morbid obesity   EYES: PERRLA and symmetric, EOMI, No conjunctival or scleral injection, non-icteric  ENMT: Oral mucosa with moist membranes. no pharyngeal injection or exudates             NECK: Supple, symmetric and without tracheal deviation   RESP: No respiratory distress, no use of accessory muscles; CTA b/l, no WRR  CV:irregularly irregular, +S1S2, no MRG; no JVD; no peripheral edema  GI: Soft, NT, ND, no rebound, no guarding; no palpable masses; no hepatosplenomegaly; no hernia palpated  LYMPH: No cervical LAD or tenderness;   MSK: Normal ROM without pain, no spinal tenderness, normal muscle strength/tone  SKIN: Left big toe: +ulcers   NEURO: CN II-XII intact; normal reflexes in upper and lower extremities, sensation intact in upper and lower extremities b/l to light touch   PSYCH: Appropriate insight/judgment; A+O x 3, mood and affect appropriate, recent/remote memory intact        LABS:                          15.6   4.75  )-----------( 195      ( 20 Mar 2025 07:42 )             45.9     20 Mar 2025 07:42    138    |  111    |  25     ----------------------------<  151    4.3     |  22     |  1.54     Ca    8.9        20 Mar 2025 07:42  Mg     2.0       20 Mar 2025 07:42    TPro  6.6    /  Alb  3.1    /  TBili  0.5    /  DBili  x      /  AST  11     /  ALT  29     /  AlkPhos  46     20 Mar 2025 07:42    LIVER FUNCTIONS - ( 20 Mar 2025 07:42 )  Alb: 3.1 g/dL / Pro: 6.6 gm/dL / ALK PHOS: 46 U/L / ALT: 29 U/L / AST: 11 U/L / GGT: x           PT/INR - ( 19 Mar 2025 12:00 )   PT: 14.4 sec;   INR: 1.25 ratio         PTT - ( 19 Mar 2025 12:00 )  PTT:44.1 sec  CAPILLARY BLOOD GLUCOSE      POCT Blood Glucose.: 156 mg/dL (20 Mar 2025 12:59)  POCT Blood Glucose.: 134 mg/dL (20 Mar 2025 08:44)  POCT Blood Glucose.: 169 mg/dL (19 Mar 2025 21:53)        Urinalysis Basic - ( 20 Mar 2025 07:42 )    Color: x / Appearance: x / SG: x / pH: x  Gluc: 151 mg/dL / Ketone: x  / Bili: x / Urobili: x   Blood: x / Protein: x / Nitrite: x   Leuk Esterase: x / RBC: x / WBC x   Sq Epi: x / Non Sq Epi: x / Bacteria: x        RADIOLOGY:

## 2025-04-10 NOTE — ED ADULT NURSE NOTE - TEMPLATE LIST FOR HEAD TO TOE ASSESSMENT
Bed: 13  Expected date: 4/10/25  Expected time: 12:54 PM  Means of arrival:   Comments:  [id number]  75 y.o. F from [place] c/o unwitnessed fall x2 yesterday while ambulating with walker. Pt c/o left ankle pain and elbow. +swelling and ecchymosis. +ASA. Denies hitting head or LOC. A&Ox4    /86 P 65 RR 18 Spo2 94% RA    
General

## 2025-04-14 ENCOUNTER — APPOINTMENT (OUTPATIENT)
Dept: CARDIOLOGY | Facility: CLINIC | Age: 40
End: 2025-04-14

## 2025-04-15 ENCOUNTER — APPOINTMENT (OUTPATIENT)
Dept: ELECTROPHYSIOLOGY | Facility: CLINIC | Age: 40
End: 2025-04-15

## 2025-04-23 ENCOUNTER — APPOINTMENT (OUTPATIENT)
Dept: CARDIOLOGY | Facility: CLINIC | Age: 40
End: 2025-04-23
Payer: COMMERCIAL

## 2025-04-23 ENCOUNTER — INPATIENT (INPATIENT)
Facility: HOSPITAL | Age: 40
LOS: 2 days | Discharge: HOME | DRG: 310 | End: 2025-04-26
Attending: STUDENT IN AN ORGANIZED HEALTH CARE EDUCATION/TRAINING PROGRAM | Admitting: FAMILY MEDICINE
Payer: COMMERCIAL

## 2025-04-23 ENCOUNTER — NON-APPOINTMENT (OUTPATIENT)
Age: 40
End: 2025-04-23

## 2025-04-23 VITALS
HEIGHT: 67 IN | WEIGHT: 315 LBS | SYSTOLIC BLOOD PRESSURE: 133 MMHG | BODY MASS INDEX: 49.44 KG/M2 | DIASTOLIC BLOOD PRESSURE: 64 MMHG | OXYGEN SATURATION: 100 % | HEART RATE: 56 BPM

## 2025-04-23 VITALS — WEIGHT: 315 LBS | HEIGHT: 67 IN

## 2025-04-23 DIAGNOSIS — I48.91 UNSPECIFIED ATRIAL FIBRILLATION: ICD-10-CM

## 2025-04-23 DIAGNOSIS — I48.0 PAROXYSMAL ATRIAL FIBRILLATION: ICD-10-CM

## 2025-04-23 DIAGNOSIS — G47.33 OBSTRUCTIVE SLEEP APNEA (ADULT) (PEDIATRIC): ICD-10-CM

## 2025-04-23 DIAGNOSIS — I51.89 OTHER ILL-DEFINED HEART DISEASES: ICD-10-CM

## 2025-04-23 DIAGNOSIS — I10 ESSENTIAL (PRIMARY) HYPERTENSION: ICD-10-CM

## 2025-04-23 LAB
ALBUMIN SERPL ELPH-MCNC: 3.3 G/DL — SIGNIFICANT CHANGE UP (ref 3.3–5)
ALP SERPL-CCNC: 60 U/L — SIGNIFICANT CHANGE UP (ref 40–120)
ALT FLD-CCNC: 33 U/L — SIGNIFICANT CHANGE UP (ref 12–78)
ANION GAP SERPL CALC-SCNC: 4 MMOL/L — LOW (ref 5–17)
AST SERPL-CCNC: 19 U/L — SIGNIFICANT CHANGE UP (ref 15–37)
BASOPHILS # BLD AUTO: 0.06 K/UL — SIGNIFICANT CHANGE UP (ref 0–0.2)
BASOPHILS NFR BLD AUTO: 0.9 % — SIGNIFICANT CHANGE UP (ref 0–2)
BILIRUB SERPL-MCNC: 0.5 MG/DL — SIGNIFICANT CHANGE UP (ref 0.2–1.2)
BUN SERPL-MCNC: 23 MG/DL — SIGNIFICANT CHANGE UP (ref 7–23)
CALCIUM SERPL-MCNC: 9.1 MG/DL — SIGNIFICANT CHANGE UP (ref 8.5–10.1)
CHLORIDE SERPL-SCNC: 109 MMOL/L — HIGH (ref 96–108)
CO2 SERPL-SCNC: 22 MMOL/L — SIGNIFICANT CHANGE UP (ref 22–31)
CREAT SERPL-MCNC: 1.62 MG/DL — HIGH (ref 0.5–1.3)
EGFR: 56 ML/MIN/1.73M2 — LOW
EGFR: 56 ML/MIN/1.73M2 — LOW
EOSINOPHIL # BLD AUTO: 0.14 K/UL — SIGNIFICANT CHANGE UP (ref 0–0.5)
EOSINOPHIL NFR BLD AUTO: 2.1 % — SIGNIFICANT CHANGE UP (ref 0–6)
GLUCOSE BLDC GLUCOMTR-MCNC: 164 MG/DL — HIGH (ref 70–99)
GLUCOSE BLDC GLUCOMTR-MCNC: 194 MG/DL — HIGH (ref 70–99)
GLUCOSE SERPL-MCNC: 226 MG/DL — HIGH (ref 70–99)
HCT VFR BLD CALC: 50.2 % — HIGH (ref 39–50)
HGB BLD-MCNC: 16.9 G/DL — SIGNIFICANT CHANGE UP (ref 13–17)
HIV 1 & 2 AB SERPL IA.RAPID: SIGNIFICANT CHANGE UP
IMM GRANULOCYTES # BLD AUTO: 0.02 K/UL — SIGNIFICANT CHANGE UP (ref 0–0.07)
IMM GRANULOCYTES NFR BLD AUTO: 0.3 % — SIGNIFICANT CHANGE UP (ref 0–0.9)
LYMPHOCYTES # BLD AUTO: 2.62 K/UL — SIGNIFICANT CHANGE UP (ref 1–3.3)
LYMPHOCYTES NFR BLD AUTO: 38.7 % — SIGNIFICANT CHANGE UP (ref 13–44)
MAGNESIUM SERPL-MCNC: 2.1 MG/DL — SIGNIFICANT CHANGE UP (ref 1.6–2.6)
MCHC RBC-ENTMCNC: 31 PG — SIGNIFICANT CHANGE UP (ref 27–34)
MCHC RBC-ENTMCNC: 33.7 G/DL — SIGNIFICANT CHANGE UP (ref 32–36)
MCV RBC AUTO: 92.1 FL — SIGNIFICANT CHANGE UP (ref 80–100)
MONOCYTES # BLD AUTO: 0.64 K/UL — SIGNIFICANT CHANGE UP (ref 0–0.9)
MONOCYTES NFR BLD AUTO: 9.5 % — SIGNIFICANT CHANGE UP (ref 2–14)
NEUTROPHILS # BLD AUTO: 3.29 K/UL — SIGNIFICANT CHANGE UP (ref 1.8–7.4)
NEUTROPHILS NFR BLD AUTO: 48.5 % — SIGNIFICANT CHANGE UP (ref 43–77)
NRBC # BLD AUTO: 0 K/UL — SIGNIFICANT CHANGE UP (ref 0–0)
NRBC # FLD: 0 K/UL — SIGNIFICANT CHANGE UP (ref 0–0)
NRBC BLD AUTO-RTO: 0 /100 WBCS — SIGNIFICANT CHANGE UP (ref 0–0)
PLATELET # BLD AUTO: 239 K/UL — SIGNIFICANT CHANGE UP (ref 150–400)
PMV BLD: 11.3 FL — SIGNIFICANT CHANGE UP (ref 7–13)
POTASSIUM SERPL-MCNC: 4.6 MMOL/L — SIGNIFICANT CHANGE UP (ref 3.5–5.3)
POTASSIUM SERPL-SCNC: 4.6 MMOL/L — SIGNIFICANT CHANGE UP (ref 3.5–5.3)
PROT SERPL-MCNC: 7.7 GM/DL — SIGNIFICANT CHANGE UP (ref 6–8.3)
RBC # BLD: 5.45 M/UL — SIGNIFICANT CHANGE UP (ref 4.2–5.8)
RBC # FLD: 15.1 % — HIGH (ref 10.3–14.5)
SODIUM SERPL-SCNC: 135 MMOL/L — SIGNIFICANT CHANGE UP (ref 135–145)
TROPONIN I, HIGH SENSITIVITY RESULT: 12.84 NG/L — SIGNIFICANT CHANGE UP
WBC # BLD: 6.77 K/UL — SIGNIFICANT CHANGE UP (ref 3.8–10.5)
WBC # FLD AUTO: 6.77 K/UL — SIGNIFICANT CHANGE UP (ref 3.8–10.5)

## 2025-04-23 PROCEDURE — G2211 COMPLEX E/M VISIT ADD ON: CPT | Mod: NC

## 2025-04-23 PROCEDURE — 80061 LIPID PANEL: CPT

## 2025-04-23 PROCEDURE — 99222 1ST HOSP IP/OBS MODERATE 55: CPT

## 2025-04-23 PROCEDURE — 84100 ASSAY OF PHOSPHORUS: CPT

## 2025-04-23 PROCEDURE — 99223 1ST HOSP IP/OBS HIGH 75: CPT | Mod: 25

## 2025-04-23 PROCEDURE — 36415 COLL VENOUS BLD VENIPUNCTURE: CPT

## 2025-04-23 PROCEDURE — 71045 X-RAY EXAM CHEST 1 VIEW: CPT | Mod: 26

## 2025-04-23 PROCEDURE — 85027 COMPLETE CBC AUTOMATED: CPT

## 2025-04-23 PROCEDURE — 82962 GLUCOSE BLOOD TEST: CPT

## 2025-04-23 PROCEDURE — 80048 BASIC METABOLIC PNL TOTAL CA: CPT

## 2025-04-23 PROCEDURE — 99214 OFFICE O/P EST MOD 30 MIN: CPT

## 2025-04-23 PROCEDURE — 94660 CPAP INITIATION&MGMT: CPT

## 2025-04-23 PROCEDURE — 93010 ELECTROCARDIOGRAM REPORT: CPT

## 2025-04-23 PROCEDURE — 93000 ELECTROCARDIOGRAM COMPLETE: CPT

## 2025-04-23 PROCEDURE — 93005 ELECTROCARDIOGRAM TRACING: CPT

## 2025-04-23 PROCEDURE — 99285 EMERGENCY DEPT VISIT HI MDM: CPT

## 2025-04-23 PROCEDURE — 80053 COMPREHEN METABOLIC PANEL: CPT

## 2025-04-23 PROCEDURE — 83036 HEMOGLOBIN GLYCOSYLATED A1C: CPT

## 2025-04-23 PROCEDURE — 83735 ASSAY OF MAGNESIUM: CPT

## 2025-04-23 RX ORDER — SPIRONOLACTONE 25 MG
1 TABLET ORAL
Refills: 0 | DISCHARGE

## 2025-04-23 RX ORDER — INSULIN LISPRO 100 U/ML
INJECTION, SOLUTION INTRAVENOUS; SUBCUTANEOUS
Refills: 0 | Status: DISCONTINUED | OUTPATIENT
Start: 2025-04-23 | End: 2025-04-26

## 2025-04-23 RX ORDER — MAGNESIUM, ALUMINUM HYDROXIDE 200-200 MG
30 TABLET,CHEWABLE ORAL EVERY 4 HOURS
Refills: 0 | Status: DISCONTINUED | OUTPATIENT
Start: 2025-04-23 | End: 2025-04-26

## 2025-04-23 RX ORDER — ONDANSETRON HCL/PF 4 MG/2 ML
4 VIAL (ML) INJECTION EVERY 8 HOURS
Refills: 0 | Status: DISCONTINUED | OUTPATIENT
Start: 2025-04-23 | End: 2025-04-23

## 2025-04-23 RX ORDER — DOFETILIDE 0.5 MG/1
500 CAPSULE ORAL EVERY 12 HOURS
Refills: 0 | Status: DISCONTINUED | OUTPATIENT
Start: 2025-04-23 | End: 2025-04-26

## 2025-04-23 RX ORDER — MELATONIN 5 MG
3 TABLET ORAL AT BEDTIME
Refills: 0 | Status: DISCONTINUED | OUTPATIENT
Start: 2025-04-23 | End: 2025-04-26

## 2025-04-23 RX ORDER — DEXTROSE 50 % IN WATER 50 %
25 SYRINGE (ML) INTRAVENOUS ONCE
Refills: 0 | Status: DISCONTINUED | OUTPATIENT
Start: 2025-04-23 | End: 2025-04-26

## 2025-04-23 RX ORDER — SPIRONOLACTONE 25 MG
25 TABLET ORAL DAILY
Refills: 0 | Status: DISCONTINUED | OUTPATIENT
Start: 2025-04-24 | End: 2025-04-26

## 2025-04-23 RX ORDER — FOLIC ACID 1 MG/1
1 TABLET ORAL
Refills: 0 | DISCHARGE

## 2025-04-23 RX ORDER — DEXTROSE 50 % IN WATER 50 %
12.5 SYRINGE (ML) INTRAVENOUS ONCE
Refills: 0 | Status: DISCONTINUED | OUTPATIENT
Start: 2025-04-23 | End: 2025-04-26

## 2025-04-23 RX ORDER — DEXTROSE 50 % IN WATER 50 %
15 SYRINGE (ML) INTRAVENOUS ONCE
Refills: 0 | Status: DISCONTINUED | OUTPATIENT
Start: 2025-04-23 | End: 2025-04-26

## 2025-04-23 RX ORDER — SODIUM CHLORIDE 9 G/1000ML
1000 INJECTION, SOLUTION INTRAVENOUS
Refills: 0 | Status: DISCONTINUED | OUTPATIENT
Start: 2025-04-23 | End: 2025-04-26

## 2025-04-23 RX ORDER — METOPROLOL SUCCINATE 50 MG/1
5 TABLET, EXTENDED RELEASE ORAL ONCE
Refills: 0 | Status: COMPLETED | OUTPATIENT
Start: 2025-04-23 | End: 2025-04-23

## 2025-04-23 RX ORDER — ALBUTEROL SULFATE 2.5 MG/3ML
2 VIAL, NEBULIZER (ML) INHALATION EVERY 6 HOURS
Refills: 0 | Status: DISCONTINUED | OUTPATIENT
Start: 2025-04-23 | End: 2025-04-26

## 2025-04-23 RX ORDER — DOFETILIDE 0.5 MG/1
250 CAPSULE ORAL EVERY 12 HOURS
Refills: 0 | Status: DISCONTINUED | OUTPATIENT
Start: 2025-04-23 | End: 2025-04-23

## 2025-04-23 RX ORDER — APIXABAN 2.5 MG/1
5 TABLET, FILM COATED ORAL EVERY 12 HOURS
Refills: 0 | Status: DISCONTINUED | OUTPATIENT
Start: 2025-04-23 | End: 2025-04-26

## 2025-04-23 RX ORDER — METOPROLOL SUCCINATE 50 MG/1
50 TABLET, EXTENDED RELEASE ORAL EVERY 8 HOURS
Refills: 0 | Status: DISCONTINUED | OUTPATIENT
Start: 2025-04-23 | End: 2025-04-25

## 2025-04-23 RX ORDER — GLUCAGON 3 MG/1
1 POWDER NASAL ONCE
Refills: 0 | Status: DISCONTINUED | OUTPATIENT
Start: 2025-04-23 | End: 2025-04-26

## 2025-04-23 RX ORDER — ACETAMINOPHEN 500 MG/5ML
650 LIQUID (ML) ORAL EVERY 6 HOURS
Refills: 0 | Status: DISCONTINUED | OUTPATIENT
Start: 2025-04-23 | End: 2025-04-26

## 2025-04-23 RX ORDER — INSULIN LISPRO 100 U/ML
INJECTION, SOLUTION INTRAVENOUS; SUBCUTANEOUS AT BEDTIME
Refills: 0 | Status: DISCONTINUED | OUTPATIENT
Start: 2025-04-23 | End: 2025-04-26

## 2025-04-23 RX ORDER — SACUBITRIL AND VALSARTAN 6; 6 MG/1; MG/1
1 PELLET ORAL
Refills: 0 | Status: DISCONTINUED | OUTPATIENT
Start: 2025-04-23 | End: 2025-04-24

## 2025-04-23 RX ADMIN — METOPROLOL SUCCINATE 50 MILLIGRAM(S): 50 TABLET, EXTENDED RELEASE ORAL at 22:30

## 2025-04-23 RX ADMIN — INSULIN LISPRO 1: 100 INJECTION, SOLUTION INTRAVENOUS; SUBCUTANEOUS at 17:29

## 2025-04-23 RX ADMIN — DOFETILIDE 500 MICROGRAM(S): 0.5 CAPSULE ORAL at 18:11

## 2025-04-23 RX ADMIN — METOPROLOL SUCCINATE 5 MILLIGRAM(S): 50 TABLET, EXTENDED RELEASE ORAL at 11:32

## 2025-04-23 RX ADMIN — APIXABAN 5 MILLIGRAM(S): 2.5 TABLET, FILM COATED ORAL at 22:31

## 2025-04-23 RX ADMIN — SACUBITRIL AND VALSARTAN 1 TABLET(S): 6; 6 PELLET ORAL at 22:30

## 2025-04-24 LAB
A1C WITH ESTIMATED AVERAGE GLUCOSE RESULT: 8.4 % — HIGH (ref 4–5.6)
ALBUMIN SERPL ELPH-MCNC: 3 G/DL — LOW (ref 3.3–5)
ALP SERPL-CCNC: 53 U/L — SIGNIFICANT CHANGE UP (ref 40–120)
ALT FLD-CCNC: 29 U/L — SIGNIFICANT CHANGE UP (ref 12–78)
ANION GAP SERPL CALC-SCNC: 5 MMOL/L — SIGNIFICANT CHANGE UP (ref 5–17)
AST SERPL-CCNC: 16 U/L — SIGNIFICANT CHANGE UP (ref 15–37)
BILIRUB SERPL-MCNC: 0.4 MG/DL — SIGNIFICANT CHANGE UP (ref 0.2–1.2)
BUN SERPL-MCNC: 31 MG/DL — HIGH (ref 7–23)
CALCIUM SERPL-MCNC: 9.6 MG/DL — SIGNIFICANT CHANGE UP (ref 8.5–10.1)
CHLORIDE SERPL-SCNC: 111 MMOL/L — HIGH (ref 96–108)
CHOLEST SERPL-MCNC: 227 MG/DL — HIGH
CO2 SERPL-SCNC: 22 MMOL/L — SIGNIFICANT CHANGE UP (ref 22–31)
CREAT SERPL-MCNC: 1.8 MG/DL — HIGH (ref 0.5–1.3)
EGFR: 49 ML/MIN/1.73M2 — LOW
EGFR: 49 ML/MIN/1.73M2 — LOW
ESTIMATED AVERAGE GLUCOSE: 194 MG/DL — HIGH (ref 68–114)
GLUCOSE BLDC GLUCOMTR-MCNC: 141 MG/DL — HIGH (ref 70–99)
GLUCOSE BLDC GLUCOMTR-MCNC: 149 MG/DL — HIGH (ref 70–99)
GLUCOSE BLDC GLUCOMTR-MCNC: 151 MG/DL — HIGH (ref 70–99)
GLUCOSE BLDC GLUCOMTR-MCNC: 152 MG/DL — HIGH (ref 70–99)
GLUCOSE SERPL-MCNC: 168 MG/DL — HIGH (ref 70–99)
HCT VFR BLD CALC: 45.9 % — SIGNIFICANT CHANGE UP (ref 39–50)
HCV AB S/CO SERPL IA: 0.14 S/CO — SIGNIFICANT CHANGE UP (ref 0–0.79)
HCV AB SERPL-IMP: SIGNIFICANT CHANGE UP
HDLC SERPL-MCNC: 41 MG/DL — SIGNIFICANT CHANGE UP
HGB BLD-MCNC: 15.5 G/DL — SIGNIFICANT CHANGE UP (ref 13–17)
LDLC SERPL-MCNC: 165 MG/DL — HIGH
LIPID PNL WITH DIRECT LDL SERPL: 165 MG/DL — HIGH
MAGNESIUM SERPL-MCNC: 2.2 MG/DL — SIGNIFICANT CHANGE UP (ref 1.6–2.6)
MCHC RBC-ENTMCNC: 31.2 PG — SIGNIFICANT CHANGE UP (ref 27–34)
MCHC RBC-ENTMCNC: 33.8 G/DL — SIGNIFICANT CHANGE UP (ref 32–36)
MCV RBC AUTO: 92.4 FL — SIGNIFICANT CHANGE UP (ref 80–100)
NONHDLC SERPL-MCNC: 186 MG/DL — HIGH
NRBC # BLD AUTO: 0 K/UL — SIGNIFICANT CHANGE UP (ref 0–0)
NRBC # FLD: 0 K/UL — SIGNIFICANT CHANGE UP (ref 0–0)
NRBC BLD AUTO-RTO: 0 /100 WBCS — SIGNIFICANT CHANGE UP (ref 0–0)
PLATELET # BLD AUTO: 207 K/UL — SIGNIFICANT CHANGE UP (ref 150–400)
PMV BLD: 11.8 FL — SIGNIFICANT CHANGE UP (ref 7–13)
POTASSIUM SERPL-MCNC: 4.4 MMOL/L — SIGNIFICANT CHANGE UP (ref 3.5–5.3)
POTASSIUM SERPL-SCNC: 4.4 MMOL/L — SIGNIFICANT CHANGE UP (ref 3.5–5.3)
PROT SERPL-MCNC: 6.6 GM/DL — SIGNIFICANT CHANGE UP (ref 6–8.3)
RBC # BLD: 4.97 M/UL — SIGNIFICANT CHANGE UP (ref 4.2–5.8)
RBC # FLD: 15.2 % — HIGH (ref 10.3–14.5)
SODIUM SERPL-SCNC: 138 MMOL/L — SIGNIFICANT CHANGE UP (ref 135–145)
TRIGL SERPL-MCNC: 113 MG/DL — SIGNIFICANT CHANGE UP
WBC # BLD: 6.27 K/UL — SIGNIFICANT CHANGE UP (ref 3.8–10.5)
WBC # FLD AUTO: 6.27 K/UL — SIGNIFICANT CHANGE UP (ref 3.8–10.5)

## 2025-04-24 PROCEDURE — 99232 SBSQ HOSP IP/OBS MODERATE 35: CPT

## 2025-04-24 PROCEDURE — 99233 SBSQ HOSP IP/OBS HIGH 50: CPT

## 2025-04-24 PROCEDURE — 99233 SBSQ HOSP IP/OBS HIGH 50: CPT | Mod: 25

## 2025-04-24 PROCEDURE — 93010 ELECTROCARDIOGRAM REPORT: CPT

## 2025-04-24 PROCEDURE — 92960 CARDIOVERSION ELECTRIC EXT: CPT

## 2025-04-24 RX ORDER — SACUBITRIL AND VALSARTAN 6; 6 MG/1; MG/1
1 PELLET ORAL
Refills: 0 | Status: DISCONTINUED | OUTPATIENT
Start: 2025-04-24 | End: 2025-04-26

## 2025-04-24 RX ADMIN — METOPROLOL SUCCINATE 50 MILLIGRAM(S): 50 TABLET, EXTENDED RELEASE ORAL at 21:29

## 2025-04-24 RX ADMIN — METOPROLOL SUCCINATE 50 MILLIGRAM(S): 50 TABLET, EXTENDED RELEASE ORAL at 15:40

## 2025-04-24 RX ADMIN — DOFETILIDE 500 MICROGRAM(S): 0.5 CAPSULE ORAL at 21:31

## 2025-04-24 RX ADMIN — Medication 25 MILLIGRAM(S): at 08:43

## 2025-04-24 RX ADMIN — APIXABAN 5 MILLIGRAM(S): 2.5 TABLET, FILM COATED ORAL at 08:43

## 2025-04-24 RX ADMIN — APIXABAN 5 MILLIGRAM(S): 2.5 TABLET, FILM COATED ORAL at 21:30

## 2025-04-24 RX ADMIN — METOPROLOL SUCCINATE 50 MILLIGRAM(S): 50 TABLET, EXTENDED RELEASE ORAL at 06:01

## 2025-04-24 RX ADMIN — INSULIN LISPRO 1: 100 INJECTION, SOLUTION INTRAVENOUS; SUBCUTANEOUS at 07:55

## 2025-04-24 RX ADMIN — INSULIN LISPRO 1: 100 INJECTION, SOLUTION INTRAVENOUS; SUBCUTANEOUS at 17:40

## 2025-04-24 RX ADMIN — DOFETILIDE 500 MICROGRAM(S): 0.5 CAPSULE ORAL at 10:03

## 2025-04-24 RX ADMIN — SACUBITRIL AND VALSARTAN 1 TABLET(S): 6; 6 PELLET ORAL at 21:31

## 2025-04-25 LAB
ANION GAP SERPL CALC-SCNC: 3 MMOL/L — LOW (ref 5–17)
BUN SERPL-MCNC: 34 MG/DL — HIGH (ref 7–23)
CALCIUM SERPL-MCNC: 9.2 MG/DL — SIGNIFICANT CHANGE UP (ref 8.5–10.1)
CHLORIDE SERPL-SCNC: 108 MMOL/L — SIGNIFICANT CHANGE UP (ref 96–108)
CO2 SERPL-SCNC: 25 MMOL/L — SIGNIFICANT CHANGE UP (ref 22–31)
CREAT SERPL-MCNC: 1.8 MG/DL — HIGH (ref 0.5–1.3)
EGFR: 49 ML/MIN/1.73M2 — LOW
EGFR: 49 ML/MIN/1.73M2 — LOW
GLUCOSE BLDC GLUCOMTR-MCNC: 132 MG/DL — HIGH (ref 70–99)
GLUCOSE BLDC GLUCOMTR-MCNC: 163 MG/DL — HIGH (ref 70–99)
GLUCOSE BLDC GLUCOMTR-MCNC: 177 MG/DL — HIGH (ref 70–99)
GLUCOSE BLDC GLUCOMTR-MCNC: 183 MG/DL — HIGH (ref 70–99)
GLUCOSE SERPL-MCNC: 161 MG/DL — HIGH (ref 70–99)
MAGNESIUM SERPL-MCNC: 2.3 MG/DL — SIGNIFICANT CHANGE UP (ref 1.6–2.6)
PHOSPHATE SERPL-MCNC: 4.7 MG/DL — HIGH (ref 2.5–4.5)
POTASSIUM SERPL-MCNC: 4.2 MMOL/L — SIGNIFICANT CHANGE UP (ref 3.5–5.3)
POTASSIUM SERPL-SCNC: 4.2 MMOL/L — SIGNIFICANT CHANGE UP (ref 3.5–5.3)
SODIUM SERPL-SCNC: 136 MMOL/L — SIGNIFICANT CHANGE UP (ref 135–145)

## 2025-04-25 PROCEDURE — 99233 SBSQ HOSP IP/OBS HIGH 50: CPT

## 2025-04-25 PROCEDURE — 99232 SBSQ HOSP IP/OBS MODERATE 35: CPT

## 2025-04-25 PROCEDURE — 93010 ELECTROCARDIOGRAM REPORT: CPT | Mod: 76

## 2025-04-25 RX ORDER — ATORVASTATIN CALCIUM 80 MG/1
20 TABLET, FILM COATED ORAL AT BEDTIME
Refills: 0 | Status: DISCONTINUED | OUTPATIENT
Start: 2025-04-25 | End: 2025-04-26

## 2025-04-25 RX ORDER — METOPROLOL SUCCINATE 50 MG/1
50 TABLET, EXTENDED RELEASE ORAL ONCE
Refills: 0 | Status: COMPLETED | OUTPATIENT
Start: 2025-04-25 | End: 2025-04-25

## 2025-04-25 RX ORDER — METOPROLOL SUCCINATE 50 MG/1
50 TABLET, EXTENDED RELEASE ORAL
Refills: 0 | Status: DISCONTINUED | OUTPATIENT
Start: 2025-04-25 | End: 2025-04-25

## 2025-04-25 RX ORDER — DAPAGLIFLOZIN 5 MG/1
10 TABLET, FILM COATED ORAL DAILY
Refills: 0 | Status: DISCONTINUED | OUTPATIENT
Start: 2025-04-25 | End: 2025-04-26

## 2025-04-25 RX ORDER — METOPROLOL SUCCINATE 50 MG/1
50 TABLET, EXTENDED RELEASE ORAL DAILY
Refills: 0 | Status: DISCONTINUED | OUTPATIENT
Start: 2025-04-26 | End: 2025-04-26

## 2025-04-25 RX ADMIN — Medication 25 MILLIGRAM(S): at 08:52

## 2025-04-25 RX ADMIN — METOPROLOL SUCCINATE 50 MILLIGRAM(S): 50 TABLET, EXTENDED RELEASE ORAL at 20:43

## 2025-04-25 RX ADMIN — DOFETILIDE 500 MICROGRAM(S): 0.5 CAPSULE ORAL at 06:00

## 2025-04-25 RX ADMIN — INSULIN LISPRO 1: 100 INJECTION, SOLUTION INTRAVENOUS; SUBCUTANEOUS at 12:16

## 2025-04-25 RX ADMIN — Medication 3 MILLIGRAM(S): at 20:44

## 2025-04-25 RX ADMIN — SACUBITRIL AND VALSARTAN 1 TABLET(S): 6; 6 PELLET ORAL at 08:52

## 2025-04-25 RX ADMIN — ATORVASTATIN CALCIUM 20 MILLIGRAM(S): 80 TABLET, FILM COATED ORAL at 20:46

## 2025-04-25 RX ADMIN — DOFETILIDE 500 MICROGRAM(S): 0.5 CAPSULE ORAL at 18:00

## 2025-04-25 RX ADMIN — APIXABAN 5 MILLIGRAM(S): 2.5 TABLET, FILM COATED ORAL at 08:52

## 2025-04-25 RX ADMIN — INSULIN LISPRO 1: 100 INJECTION, SOLUTION INTRAVENOUS; SUBCUTANEOUS at 08:21

## 2025-04-25 RX ADMIN — APIXABAN 5 MILLIGRAM(S): 2.5 TABLET, FILM COATED ORAL at 20:44

## 2025-04-25 RX ADMIN — METOPROLOL SUCCINATE 50 MILLIGRAM(S): 50 TABLET, EXTENDED RELEASE ORAL at 06:01

## 2025-04-25 RX ADMIN — DAPAGLIFLOZIN 10 MILLIGRAM(S): 5 TABLET, FILM COATED ORAL at 13:39

## 2025-04-25 RX ADMIN — SACUBITRIL AND VALSARTAN 1 TABLET(S): 6; 6 PELLET ORAL at 20:43

## 2025-04-26 ENCOUNTER — TRANSCRIPTION ENCOUNTER (OUTPATIENT)
Age: 40
End: 2025-04-26

## 2025-04-26 VITALS
TEMPERATURE: 98 F | SYSTOLIC BLOOD PRESSURE: 147 MMHG | OXYGEN SATURATION: 99 % | HEART RATE: 69 BPM | RESPIRATION RATE: 19 BRPM | DIASTOLIC BLOOD PRESSURE: 95 MMHG

## 2025-04-26 LAB
ANION GAP SERPL CALC-SCNC: 4 MMOL/L — LOW (ref 5–17)
BUN SERPL-MCNC: 30 MG/DL — HIGH (ref 7–23)
CALCIUM SERPL-MCNC: 8.8 MG/DL — SIGNIFICANT CHANGE UP (ref 8.5–10.1)
CHLORIDE SERPL-SCNC: 112 MMOL/L — HIGH (ref 96–108)
CO2 SERPL-SCNC: 24 MMOL/L — SIGNIFICANT CHANGE UP (ref 22–31)
CREAT SERPL-MCNC: 1.67 MG/DL — HIGH (ref 0.5–1.3)
EGFR: 54 ML/MIN/1.73M2 — LOW
EGFR: 54 ML/MIN/1.73M2 — LOW
GLUCOSE BLDC GLUCOMTR-MCNC: 112 MG/DL — HIGH (ref 70–99)
GLUCOSE BLDC GLUCOMTR-MCNC: 129 MG/DL — HIGH (ref 70–99)
GLUCOSE BLDC GLUCOMTR-MCNC: 172 MG/DL — HIGH (ref 70–99)
GLUCOSE SERPL-MCNC: 183 MG/DL — HIGH (ref 70–99)
MAGNESIUM SERPL-MCNC: 2.2 MG/DL — SIGNIFICANT CHANGE UP (ref 1.6–2.6)
POTASSIUM SERPL-MCNC: 4.3 MMOL/L — SIGNIFICANT CHANGE UP (ref 3.5–5.3)
POTASSIUM SERPL-SCNC: 4.3 MMOL/L — SIGNIFICANT CHANGE UP (ref 3.5–5.3)
SODIUM SERPL-SCNC: 140 MMOL/L — SIGNIFICANT CHANGE UP (ref 135–145)

## 2025-04-26 PROCEDURE — 93010 ELECTROCARDIOGRAM REPORT: CPT | Mod: 76

## 2025-04-26 PROCEDURE — 99232 SBSQ HOSP IP/OBS MODERATE 35: CPT

## 2025-04-26 PROCEDURE — 99233 SBSQ HOSP IP/OBS HIGH 50: CPT

## 2025-04-26 PROCEDURE — 99239 HOSP IP/OBS DSCHRG MGMT >30: CPT

## 2025-04-26 RX ORDER — SACUBITRIL AND VALSARTAN 6; 6 MG/1; MG/1
1 PELLET ORAL
Refills: 0 | Status: DISCONTINUED | OUTPATIENT
Start: 2025-04-26 | End: 2025-04-26

## 2025-04-26 RX ORDER — ATORVASTATIN CALCIUM 80 MG/1
1 TABLET, FILM COATED ORAL
Qty: 30 | Refills: 0
Start: 2025-04-26 | End: 2025-05-25

## 2025-04-26 RX ORDER — SACUBITRIL AND VALSARTAN 6; 6 MG/1; MG/1
1 PELLET ORAL
Qty: 60 | Refills: 0
Start: 2025-04-26 | End: 2025-05-25

## 2025-04-26 RX ORDER — APIXABAN 2.5 MG/1
1 TABLET, FILM COATED ORAL
Qty: 180 | Refills: 3
Start: 2025-04-26 | End: 2026-04-20

## 2025-04-26 RX ORDER — METOPROLOL SUCCINATE 50 MG/1
1 TABLET, EXTENDED RELEASE ORAL
Qty: 30 | Refills: 0
Start: 2025-04-26 | End: 2025-05-25

## 2025-04-26 RX ORDER — DOFETILIDE 0.5 MG/1
1 CAPSULE ORAL
Qty: 60 | Refills: 3
Start: 2025-04-26 | End: 2025-08-23

## 2025-04-26 RX ORDER — DAPAGLIFLOZIN 5 MG/1
1 TABLET, FILM COATED ORAL
Qty: 30 | Refills: 0
Start: 2025-04-26 | End: 2025-05-25

## 2025-04-26 RX ADMIN — DOFETILIDE 500 MICROGRAM(S): 0.5 CAPSULE ORAL at 06:13

## 2025-04-26 RX ADMIN — DAPAGLIFLOZIN 10 MILLIGRAM(S): 5 TABLET, FILM COATED ORAL at 07:44

## 2025-04-26 RX ADMIN — METOPROLOL SUCCINATE 50 MILLIGRAM(S): 50 TABLET, EXTENDED RELEASE ORAL at 07:44

## 2025-04-26 RX ADMIN — APIXABAN 5 MILLIGRAM(S): 2.5 TABLET, FILM COATED ORAL at 07:44

## 2025-04-26 RX ADMIN — INSULIN LISPRO 1: 100 INJECTION, SOLUTION INTRAVENOUS; SUBCUTANEOUS at 07:45

## 2025-04-26 RX ADMIN — Medication 25 MILLIGRAM(S): at 07:44

## 2025-04-26 RX ADMIN — SACUBITRIL AND VALSARTAN 1 TABLET(S): 6; 6 PELLET ORAL at 07:45

## 2025-04-26 RX ADMIN — DOFETILIDE 500 MICROGRAM(S): 0.5 CAPSULE ORAL at 17:25

## 2025-04-30 DIAGNOSIS — E66.01 MORBID (SEVERE) OBESITY DUE TO EXCESS CALORIES: ICD-10-CM

## 2025-04-30 DIAGNOSIS — Z88.0 ALLERGY STATUS TO PENICILLIN: ICD-10-CM

## 2025-04-30 DIAGNOSIS — Z99.89 DEPENDENCE ON OTHER ENABLING MACHINES AND DEVICES: ICD-10-CM

## 2025-04-30 DIAGNOSIS — Z79.84 LONG TERM (CURRENT) USE OF ORAL HYPOGLYCEMIC DRUGS: ICD-10-CM

## 2025-04-30 DIAGNOSIS — I50.32 CHRONIC DIASTOLIC (CONGESTIVE) HEART FAILURE: ICD-10-CM

## 2025-04-30 DIAGNOSIS — I95.9 HYPOTENSION, UNSPECIFIED: ICD-10-CM

## 2025-04-30 DIAGNOSIS — Z79.01 LONG TERM (CURRENT) USE OF ANTICOAGULANTS: ICD-10-CM

## 2025-04-30 DIAGNOSIS — E11.65 TYPE 2 DIABETES MELLITUS WITH HYPERGLYCEMIA: ICD-10-CM

## 2025-04-30 DIAGNOSIS — I48.0 PAROXYSMAL ATRIAL FIBRILLATION: ICD-10-CM

## 2025-04-30 DIAGNOSIS — G47.33 OBSTRUCTIVE SLEEP APNEA (ADULT) (PEDIATRIC): ICD-10-CM

## 2025-04-30 DIAGNOSIS — E11.22 TYPE 2 DIABETES MELLITUS WITH DIABETIC CHRONIC KIDNEY DISEASE: ICD-10-CM

## 2025-06-13 PROBLEM — I50.32 CHRONIC DIASTOLIC (CONGESTIVE) HEART FAILURE: Chronic | Status: ACTIVE | Noted: 2025-04-23

## 2025-06-13 PROBLEM — E66.01 MORBID (SEVERE) OBESITY DUE TO EXCESS CALORIES: Chronic | Status: ACTIVE | Noted: 2025-04-23

## 2025-06-13 PROBLEM — I48.0 PAROXYSMAL ATRIAL FIBRILLATION: Chronic | Status: ACTIVE | Noted: 2025-04-23

## 2025-06-18 ENCOUNTER — APPOINTMENT (OUTPATIENT)
Dept: CARDIOLOGY | Facility: CLINIC | Age: 40
End: 2025-06-18

## 2025-06-18 ENCOUNTER — APPOINTMENT (OUTPATIENT)
Dept: ELECTROPHYSIOLOGY | Facility: CLINIC | Age: 40
End: 2025-06-18
Payer: COMMERCIAL

## 2025-06-18 VITALS
SYSTOLIC BLOOD PRESSURE: 150 MMHG | OXYGEN SATURATION: 99 % | BODY MASS INDEX: 49.44 KG/M2 | DIASTOLIC BLOOD PRESSURE: 99 MMHG | HEIGHT: 67 IN | WEIGHT: 315 LBS | HEART RATE: 79 BPM

## 2025-06-18 PROCEDURE — G2211 COMPLEX E/M VISIT ADD ON: CPT | Mod: NC

## 2025-06-18 PROCEDURE — 99214 OFFICE O/P EST MOD 30 MIN: CPT

## 2025-06-18 PROCEDURE — 93000 ELECTROCARDIOGRAM COMPLETE: CPT

## 2025-06-18 RX ORDER — DOFETILIDE 0.5 MG/1
500 CAPSULE ORAL TWICE DAILY
Qty: 180 | Refills: 0 | Status: ACTIVE | COMMUNITY
Start: 1900-01-01 | End: 1900-01-01

## 2025-06-18 RX ORDER — DAPAGLIFLOZIN 10 MG/1
10 TABLET, FILM COATED ORAL DAILY
Refills: 0 | Status: ACTIVE | COMMUNITY
Start: 2025-06-18

## 2025-09-19 ENCOUNTER — APPOINTMENT (OUTPATIENT)
Dept: ELECTROPHYSIOLOGY | Facility: CLINIC | Age: 40
End: 2025-09-19

## 2025-09-19 ENCOUNTER — APPOINTMENT (OUTPATIENT)
Dept: CARDIOLOGY | Facility: CLINIC | Age: 40
End: 2025-09-19